# Patient Record
Sex: MALE | Race: WHITE | NOT HISPANIC OR LATINO | Employment: OTHER | URBAN - METROPOLITAN AREA
[De-identification: names, ages, dates, MRNs, and addresses within clinical notes are randomized per-mention and may not be internally consistent; named-entity substitution may affect disease eponyms.]

---

## 2018-03-26 ENCOUNTER — OFFICE VISIT CONVERTED (OUTPATIENT)
Dept: SURGERY | Facility: CLINIC | Age: 63
End: 2018-03-26
Attending: UROLOGY

## 2018-07-02 ENCOUNTER — CONVERSION ENCOUNTER (OUTPATIENT)
Dept: SURGERY | Facility: CLINIC | Age: 63
End: 2018-07-02

## 2018-07-02 ENCOUNTER — OFFICE VISIT CONVERTED (OUTPATIENT)
Dept: SURGERY | Facility: CLINIC | Age: 63
End: 2018-07-02
Attending: UROLOGY

## 2019-01-07 ENCOUNTER — HOSPITAL ENCOUNTER (OUTPATIENT)
Dept: LAB | Facility: HOSPITAL | Age: 64
Discharge: HOME | End: 2019-01-07

## 2019-01-07 ENCOUNTER — OFFICE VISIT CONVERTED (OUTPATIENT)
Dept: SURGERY | Facility: CLINIC | Age: 64
End: 2019-01-07
Attending: UROLOGY

## 2019-01-07 ENCOUNTER — CONVERSION ENCOUNTER (OUTPATIENT)
Dept: SURGERY | Facility: CLINIC | Age: 64
End: 2019-01-07

## 2019-01-07 LAB — PSA SERPL-MCNC: 0.04 NG/ML (ref 0–4)

## 2019-01-08 ENCOUNTER — HOSPITAL ENCOUNTER (OUTPATIENT)
Dept: MRI IMAGING | Facility: HOSPITAL | Age: 64
Discharge: HOME | End: 2019-01-08
Attending: NURSE PRACTITIONER

## 2019-01-14 ENCOUNTER — OFFICE VISIT CONVERTED (OUTPATIENT)
Dept: ORTHOPEDIC SURGERY | Facility: CLINIC | Age: 64
End: 2019-01-14
Attending: PHYSICIAN ASSISTANT

## 2019-02-07 ENCOUNTER — HOSPITAL ENCOUNTER (OUTPATIENT)
Dept: PERIOP | Facility: HOSPITAL | Age: 64
Setting detail: HOSPITAL OUTPATIENT SURGERY
Discharge: HOME | End: 2019-02-07
Attending: ORTHOPAEDIC SURGERY

## 2019-02-07 LAB
ANION GAP SERPL CALC-SCNC: 16 MMOL/L (ref 8–19)
BUN SERPL-MCNC: 24 MG/DL (ref 5–25)
BUN/CREAT SERPL: 20 {RATIO} (ref 6–20)
CALCIUM SERPL-MCNC: 8.8 MG/DL (ref 8.7–10.4)
CHLORIDE SERPL-SCNC: 104 MMOL/L (ref 99–111)
CONV CO2: 24 MMOL/L (ref 22–32)
CREAT UR-MCNC: 1.22 MG/DL (ref 0.7–1.2)
GFR SERPLBLD BASED ON 1.73 SQ M-ARVRAT: >60 ML/MIN/{1.73_M2}
GLUCOSE SERPL-MCNC: 93 MG/DL (ref 70–99)
OSMOLALITY SERPL CALC.SUM OF ELEC: 294 MOSM/KG (ref 273–304)
POTASSIUM SERPL-SCNC: 4.3 MMOL/L (ref 3.5–5.3)
SODIUM SERPL-SCNC: 140 MMOL/L (ref 135–147)

## 2019-02-20 ENCOUNTER — OFFICE VISIT CONVERTED (OUTPATIENT)
Dept: ORTHOPEDIC SURGERY | Facility: CLINIC | Age: 64
End: 2019-02-20
Attending: PHYSICIAN ASSISTANT

## 2019-07-30 ENCOUNTER — HOSPITAL ENCOUNTER (OUTPATIENT)
Dept: LAB | Facility: HOSPITAL | Age: 64
Discharge: HOME | End: 2019-07-30

## 2019-07-30 ENCOUNTER — OFFICE VISIT CONVERTED (OUTPATIENT)
Dept: SURGERY | Facility: CLINIC | Age: 64
End: 2019-07-30
Attending: UROLOGY

## 2019-07-30 LAB — PSA SERPL-MCNC: 0.06 NG/ML (ref 0–4)

## 2019-12-30 ENCOUNTER — HOSPITAL ENCOUNTER (OUTPATIENT)
Dept: GENERAL RADIOLOGY | Facility: HOSPITAL | Age: 64
Discharge: HOME | End: 2019-12-30
Attending: FAMILY MEDICINE

## 2020-01-24 ENCOUNTER — HOSPITAL ENCOUNTER (OUTPATIENT)
Dept: GENERAL RADIOLOGY | Facility: HOSPITAL | Age: 65
Discharge: HOME | End: 2020-01-24
Attending: NURSE PRACTITIONER

## 2020-01-27 ENCOUNTER — OFFICE VISIT CONVERTED (OUTPATIENT)
Dept: UROLOGY | Facility: CLINIC | Age: 65
End: 2020-01-27
Attending: UROLOGY

## 2020-02-06 ENCOUNTER — OFFICE VISIT CONVERTED (OUTPATIENT)
Dept: NEUROSURGERY | Facility: CLINIC | Age: 65
End: 2020-02-06
Attending: PHYSICIAN ASSISTANT

## 2020-02-20 ENCOUNTER — HOSPITAL ENCOUNTER (OUTPATIENT)
Dept: NUCLEAR MEDICINE | Facility: HOSPITAL | Age: 65
Discharge: HOME | End: 2020-02-20
Attending: FAMILY MEDICINE

## 2020-02-25 ENCOUNTER — HOSPITAL ENCOUNTER (OUTPATIENT)
Dept: GENERAL RADIOLOGY | Facility: HOSPITAL | Age: 65
Discharge: HOME | End: 2020-02-25
Attending: FAMILY MEDICINE

## 2020-04-30 ENCOUNTER — TELEPHONE CONVERTED (OUTPATIENT)
Dept: NEUROSURGERY | Facility: CLINIC | Age: 65
End: 2020-04-30
Attending: PHYSICIAN ASSISTANT

## 2020-05-21 ENCOUNTER — OFFICE VISIT CONVERTED (OUTPATIENT)
Dept: NEUROSURGERY | Facility: CLINIC | Age: 65
End: 2020-05-21
Attending: NEUROLOGICAL SURGERY

## 2020-06-30 LAB — SARS-COV-2 RNA SPEC QL NAA+PROBE: NOT DETECTED

## 2020-07-01 ENCOUNTER — HOSPITAL ENCOUNTER (OUTPATIENT)
Dept: PERIOP | Facility: HOSPITAL | Age: 65
Setting detail: HOSPITAL OUTPATIENT SURGERY
Discharge: HOME | End: 2020-07-01
Attending: NEUROLOGICAL SURGERY

## 2020-07-23 ENCOUNTER — OFFICE VISIT CONVERTED (OUTPATIENT)
Dept: NEUROSURGERY | Facility: CLINIC | Age: 65
End: 2020-07-23
Attending: PHYSICIAN ASSISTANT

## 2020-07-27 ENCOUNTER — HOSPITAL ENCOUNTER (OUTPATIENT)
Dept: LAB | Facility: HOSPITAL | Age: 65
Discharge: HOME | End: 2020-07-27
Attending: UROLOGY

## 2020-07-27 ENCOUNTER — OFFICE VISIT CONVERTED (OUTPATIENT)
Dept: UROLOGY | Facility: CLINIC | Age: 65
End: 2020-07-27
Attending: UROLOGY

## 2020-07-27 ENCOUNTER — CONVERSION ENCOUNTER (OUTPATIENT)
Dept: SURGERY | Facility: CLINIC | Age: 65
End: 2020-07-27

## 2020-07-27 LAB — PSA SERPL-MCNC: 0.1 NG/ML (ref 0–4)

## 2020-08-12 ENCOUNTER — HOSPITAL ENCOUNTER (OUTPATIENT)
Dept: LAB | Facility: HOSPITAL | Age: 65
Discharge: HOME | End: 2020-08-12
Attending: FAMILY MEDICINE

## 2020-08-12 LAB
ALBUMIN SERPL-MCNC: 4 G/DL (ref 3.5–5)
ALBUMIN/GLOB SERPL: 1.3 {RATIO} (ref 1.4–2.6)
ALP SERPL-CCNC: 85 U/L (ref 56–155)
ALT SERPL-CCNC: 22 U/L (ref 10–40)
ANION GAP SERPL CALC-SCNC: 20 MMOL/L (ref 8–19)
AST SERPL-CCNC: 19 U/L (ref 15–50)
BASOPHILS # BLD AUTO: 0.07 10*3/UL (ref 0–0.2)
BASOPHILS NFR BLD AUTO: 1.1 % (ref 0–3)
BILIRUB SERPL-MCNC: 0.79 MG/DL (ref 0.2–1.3)
BUN SERPL-MCNC: 23 MG/DL (ref 5–25)
BUN/CREAT SERPL: 14 {RATIO} (ref 6–20)
CALCIUM SERPL-MCNC: 10 MG/DL (ref 8.7–10.4)
CHLORIDE SERPL-SCNC: 101 MMOL/L (ref 99–111)
CHOLEST SERPL-MCNC: 129 MG/DL (ref 107–200)
CHOLEST/HDLC SERPL: 4.6 {RATIO} (ref 3–6)
CONV ABS IMM GRAN: 0.07 10*3/UL (ref 0–0.2)
CONV CO2: 25 MMOL/L (ref 22–32)
CONV IMMATURE GRAN: 1.1 % (ref 0–1.8)
CONV TOTAL PROTEIN: 7.1 G/DL (ref 6.3–8.2)
CREAT UR-MCNC: 1.69 MG/DL (ref 0.7–1.2)
DEPRECATED RDW RBC AUTO: 46.4 FL (ref 35.1–43.9)
EOSINOPHIL # BLD AUTO: 0.2 10*3/UL (ref 0–0.7)
EOSINOPHIL # BLD AUTO: 3.2 % (ref 0–7)
ERYTHROCYTE [DISTWIDTH] IN BLOOD BY AUTOMATED COUNT: 14.5 % (ref 11.6–14.4)
GFR SERPLBLD BASED ON 1.73 SQ M-ARVRAT: 42 ML/MIN/{1.73_M2}
GLOBULIN UR ELPH-MCNC: 3.1 G/DL (ref 2–3.5)
GLUCOSE SERPL-MCNC: 96 MG/DL (ref 70–99)
HCT VFR BLD AUTO: 36.5 % (ref 42–52)
HDLC SERPL-MCNC: 28 MG/DL (ref 40–60)
HGB BLD-MCNC: 11.8 G/DL (ref 14–18)
LDLC SERPL CALC-MCNC: 42 MG/DL (ref 70–100)
LYMPHOCYTES # BLD AUTO: 1.48 10*3/UL (ref 1–5)
LYMPHOCYTES NFR BLD AUTO: 23.4 % (ref 20–45)
MCH RBC QN AUTO: 29.1 PG (ref 27–31)
MCHC RBC AUTO-ENTMCNC: 32.3 G/DL (ref 33–37)
MCV RBC AUTO: 89.9 FL (ref 80–96)
MONOCYTES # BLD AUTO: 0.83 10*3/UL (ref 0.2–1.2)
MONOCYTES NFR BLD AUTO: 13.1 % (ref 3–10)
NEUTROPHILS # BLD AUTO: 3.67 10*3/UL (ref 2–8)
NEUTROPHILS NFR BLD AUTO: 58.1 % (ref 30–85)
NRBC CBCN: 0 % (ref 0–0.7)
OSMOLALITY SERPL CALC.SUM OF ELEC: 298 MOSM/KG (ref 273–304)
PLATELET # BLD AUTO: 223 10*3/UL (ref 130–400)
PMV BLD AUTO: 9.8 FL (ref 9.4–12.4)
POTASSIUM SERPL-SCNC: 3.6 MMOL/L (ref 3.5–5.3)
RBC # BLD AUTO: 4.06 10*6/UL (ref 4.7–6.1)
SODIUM SERPL-SCNC: 142 MMOL/L (ref 135–147)
TESTOST SERPL-MCNC: 458 NG/DL (ref 193–740)
TRIGL SERPL-MCNC: 295 MG/DL (ref 40–150)
TSH SERPL-ACNC: 2.76 M[IU]/L (ref 0.27–4.2)
VLDLC SERPL-MCNC: 59 MG/DL (ref 5–37)
WBC # BLD AUTO: 6.32 10*3/UL (ref 4.8–10.8)

## 2020-09-17 ENCOUNTER — HOSPITAL ENCOUNTER (OUTPATIENT)
Dept: ULTRASOUND IMAGING | Facility: HOSPITAL | Age: 65
Discharge: HOME | End: 2020-09-17
Attending: NURSE PRACTITIONER

## 2020-10-15 ENCOUNTER — OFFICE VISIT CONVERTED (OUTPATIENT)
Dept: FAMILY MEDICINE CLINIC | Facility: CLINIC | Age: 65
End: 2020-10-15
Attending: PHYSICIAN ASSISTANT

## 2020-10-26 ENCOUNTER — HOSPITAL ENCOUNTER (OUTPATIENT)
Dept: LAB | Facility: HOSPITAL | Age: 65
Discharge: HOME | End: 2020-10-26
Attending: NURSE PRACTITIONER

## 2020-10-26 LAB
ALBUMIN SERPL-MCNC: 4 G/DL (ref 3.5–5)
ALBUMIN/GLOB SERPL: 1.3 {RATIO} (ref 1.4–2.6)
ALP SERPL-CCNC: 100 U/L (ref 56–155)
ALT SERPL-CCNC: 16 U/L (ref 10–40)
ANION GAP SERPL CALC-SCNC: 18 MMOL/L (ref 8–19)
APPEARANCE UR: ABNORMAL
AST SERPL-CCNC: 16 U/L (ref 15–50)
BASOPHILS # BLD AUTO: 0.05 10*3/UL (ref 0–0.2)
BASOPHILS NFR BLD AUTO: 0.9 % (ref 0–3)
BILIRUB SERPL-MCNC: 0.36 MG/DL (ref 0.2–1.3)
BILIRUB UR QL: NEGATIVE
BUN SERPL-MCNC: 15 MG/DL (ref 5–25)
BUN/CREAT SERPL: 15 {RATIO} (ref 6–20)
CALCIUM SERPL-MCNC: 9.2 MG/DL (ref 8.7–10.4)
CHLORIDE SERPL-SCNC: 105 MMOL/L (ref 99–111)
COLOR UR: YELLOW
CONV ABS IMM GRAN: 0.03 10*3/UL (ref 0–0.2)
CONV BACTERIA: NEGATIVE
CONV CO2: 23 MMOL/L (ref 22–32)
CONV COLLECTION SOURCE (UA): ABNORMAL
CONV IMMATURE GRAN: 0.6 % (ref 0–1.8)
CONV TOTAL PROTEIN: 7.2 G/DL (ref 6.3–8.2)
CONV UROBILINOGEN IN URINE BY AUTOMATED TEST STRIP: 0.2 {EHRLICHU}/DL (ref 0.1–1)
CREAT UR-MCNC: 0.98 MG/DL (ref 0.7–1.2)
DEPRECATED RDW RBC AUTO: 42.8 FL (ref 35.1–43.9)
EOSINOPHIL # BLD AUTO: 0.16 10*3/UL (ref 0–0.7)
EOSINOPHIL # BLD AUTO: 3 % (ref 0–7)
ERYTHROCYTE [DISTWIDTH] IN BLOOD BY AUTOMATED COUNT: 13.2 % (ref 11.6–14.4)
FERRITIN SERPL-MCNC: 60 NG/ML (ref 30–300)
GFR SERPLBLD BASED ON 1.73 SQ M-ARVRAT: >60 ML/MIN/{1.73_M2}
GLOBULIN UR ELPH-MCNC: 3.2 G/DL (ref 2–3.5)
GLUCOSE SERPL-MCNC: 96 MG/DL (ref 70–99)
GLUCOSE UR QL: NEGATIVE MG/DL
HCT VFR BLD AUTO: 39.9 % (ref 42–52)
HGB BLD-MCNC: 12.8 G/DL (ref 14–18)
HGB UR QL STRIP: NEGATIVE
IRON SATN MFR SERPL: 14 % (ref 20–55)
IRON SERPL-MCNC: 50 UG/DL (ref 70–180)
KETONES UR QL STRIP: NEGATIVE MG/DL
LEUKOCYTE ESTERASE UR QL STRIP: NEGATIVE
LYMPHOCYTES # BLD AUTO: 1.3 10*3/UL (ref 1–5)
LYMPHOCYTES NFR BLD AUTO: 24.6 % (ref 20–45)
MCH RBC QN AUTO: 28.5 PG (ref 27–31)
MCHC RBC AUTO-ENTMCNC: 32.1 G/DL (ref 33–37)
MCV RBC AUTO: 88.9 FL (ref 80–96)
MONOCYTES # BLD AUTO: 0.69 10*3/UL (ref 0.2–1.2)
MONOCYTES NFR BLD AUTO: 13 % (ref 3–10)
NEUTROPHILS # BLD AUTO: 3.06 10*3/UL (ref 2–8)
NEUTROPHILS NFR BLD AUTO: 57.9 % (ref 30–85)
NITRITE UR QL STRIP: NEGATIVE
NRBC CBCN: 0 % (ref 0–0.7)
OSMOLALITY SERPL CALC.SUM OF ELEC: 295 MOSM/KG (ref 273–304)
PH UR STRIP.AUTO: 5 [PH] (ref 5–8)
PLATELET # BLD AUTO: 265 10*3/UL (ref 130–400)
PMV BLD AUTO: 9.1 FL (ref 9.4–12.4)
POTASSIUM SERPL-SCNC: 3.7 MMOL/L (ref 3.5–5.3)
PROT UR QL: NEGATIVE MG/DL
RBC # BLD AUTO: 4.49 10*6/UL (ref 4.7–6.1)
RBC #/AREA URNS HPF: ABNORMAL /[HPF]
SODIUM SERPL-SCNC: 142 MMOL/L (ref 135–147)
SP GR UR: 1.02 (ref 1–1.03)
TIBC SERPL-MCNC: 347 UG/DL (ref 245–450)
TRANSFERRIN SERPL-MCNC: 243 MG/DL (ref 215–365)
WBC # BLD AUTO: 5.29 10*3/UL (ref 4.8–10.8)
WBC #/AREA URNS HPF: ABNORMAL /[HPF]

## 2020-11-16 ENCOUNTER — OFFICE VISIT CONVERTED (OUTPATIENT)
Dept: OTOLARYNGOLOGY | Facility: CLINIC | Age: 65
End: 2020-11-16
Attending: OTOLARYNGOLOGY

## 2020-12-10 ENCOUNTER — HOSPITAL ENCOUNTER (OUTPATIENT)
Dept: GENERAL RADIOLOGY | Facility: HOSPITAL | Age: 65
Discharge: HOME | End: 2020-12-10
Attending: OTOLARYNGOLOGY

## 2020-12-10 LAB
CREAT BLD-MCNC: 1 MG/DL (ref 0.6–1.4)
GFR SERPLBLD BASED ON 1.73 SQ M-ARVRAT: >60 ML/MIN/{1.73_M2}

## 2020-12-14 ENCOUNTER — TELEPHONE CONVERTED (OUTPATIENT)
Dept: OTOLARYNGOLOGY | Facility: CLINIC | Age: 65
End: 2020-12-14
Attending: OTOLARYNGOLOGY

## 2021-01-11 ENCOUNTER — HOSPITAL ENCOUNTER (OUTPATIENT)
Dept: LAB | Facility: HOSPITAL | Age: 66
Discharge: HOME | End: 2021-01-11
Attending: UROLOGY

## 2021-01-11 LAB — PSA SERPL-MCNC: 0.07 NG/ML (ref 0–4)

## 2021-01-15 ENCOUNTER — OFFICE VISIT CONVERTED (OUTPATIENT)
Dept: FAMILY MEDICINE CLINIC | Facility: CLINIC | Age: 66
End: 2021-01-15
Attending: PHYSICIAN ASSISTANT

## 2021-01-21 ENCOUNTER — OFFICE VISIT CONVERTED (OUTPATIENT)
Dept: SURGERY | Facility: CLINIC | Age: 66
End: 2021-01-21
Attending: NURSE PRACTITIONER

## 2021-01-21 ENCOUNTER — CONVERSION ENCOUNTER (OUTPATIENT)
Dept: SURGERY | Facility: CLINIC | Age: 66
End: 2021-01-21

## 2021-01-28 ENCOUNTER — HOSPITAL ENCOUNTER (OUTPATIENT)
Dept: LAB | Facility: HOSPITAL | Age: 66
Discharge: HOME | End: 2021-01-28
Attending: INTERNAL MEDICINE

## 2021-01-28 LAB
ALBUMIN SERPL-MCNC: 4.1 G/DL (ref 3.5–5)
ALBUMIN/GLOB SERPL: 1.3 {RATIO} (ref 1.4–2.6)
ALP SERPL-CCNC: 127 U/L (ref 56–155)
ALT SERPL-CCNC: 14 U/L (ref 10–40)
ANION GAP SERPL CALC-SCNC: 17 MMOL/L (ref 8–19)
AST SERPL-CCNC: 15 U/L (ref 15–50)
BASOPHILS # BLD AUTO: 0.05 10*3/UL (ref 0–0.2)
BASOPHILS NFR BLD AUTO: 0.7 % (ref 0–3)
BILIRUB SERPL-MCNC: 0.61 MG/DL (ref 0.2–1.3)
BUN SERPL-MCNC: 22 MG/DL (ref 5–25)
BUN/CREAT SERPL: 20 {RATIO} (ref 6–20)
CALCIUM SERPL-MCNC: 9.1 MG/DL (ref 8.7–10.4)
CHLORIDE SERPL-SCNC: 104 MMOL/L (ref 99–111)
CONV ABS IMM GRAN: 0.04 10*3/UL (ref 0–0.2)
CONV CO2: 23 MMOL/L (ref 22–32)
CONV CREATININE URINE, RANDOM: 52.7 MG/DL (ref 10–300)
CONV IMMATURE GRAN: 0.6 % (ref 0–1.8)
CONV TOTAL PROTEIN: 7.2 G/DL (ref 6.3–8.2)
CREAT UR-MCNC: 1.09 MG/DL (ref 0.7–1.2)
DEPRECATED RDW RBC AUTO: 45.7 FL (ref 35.1–43.9)
EOSINOPHIL # BLD AUTO: 0.14 10*3/UL (ref 0–0.7)
EOSINOPHIL # BLD AUTO: 2 % (ref 0–7)
ERYTHROCYTE [DISTWIDTH] IN BLOOD BY AUTOMATED COUNT: 14.2 % (ref 11.6–14.4)
GFR SERPLBLD BASED ON 1.73 SQ M-ARVRAT: >60 ML/MIN/{1.73_M2}
GLOBULIN UR ELPH-MCNC: 3.1 G/DL (ref 2–3.5)
GLUCOSE SERPL-MCNC: 121 MG/DL (ref 70–99)
HCT VFR BLD AUTO: 41.7 % (ref 42–52)
HGB BLD-MCNC: 13.4 G/DL (ref 14–18)
LYMPHOCYTES # BLD AUTO: 1.54 10*3/UL (ref 1–5)
LYMPHOCYTES NFR BLD AUTO: 21.7 % (ref 20–45)
MCH RBC QN AUTO: 28.5 PG (ref 27–31)
MCHC RBC AUTO-ENTMCNC: 32.1 G/DL (ref 33–37)
MCV RBC AUTO: 88.5 FL (ref 80–96)
MONOCYTES # BLD AUTO: 0.78 10*3/UL (ref 0.2–1.2)
MONOCYTES NFR BLD AUTO: 11 % (ref 3–10)
NEUTROPHILS # BLD AUTO: 4.56 10*3/UL (ref 2–8)
NEUTROPHILS NFR BLD AUTO: 64 % (ref 30–85)
NRBC CBCN: 0 % (ref 0–0.7)
OSMOLALITY SERPL CALC.SUM OF ELEC: 295 MOSM/KG (ref 273–304)
PLATELET # BLD AUTO: 309 10*3/UL (ref 130–400)
PMV BLD AUTO: 9.7 FL (ref 9.4–12.4)
POTASSIUM SERPL-SCNC: 3.7 MMOL/L (ref 3.5–5.3)
PROT UR-MCNC: <4 MG/DL
RBC # BLD AUTO: 4.71 10*6/UL (ref 4.7–6.1)
SODIUM SERPL-SCNC: 140 MMOL/L (ref 135–147)
URATE SERPL-MCNC: 4.4 MG/DL (ref 3.5–8.5)
WBC # BLD AUTO: 7.11 10*3/UL (ref 4.8–10.8)

## 2021-02-04 ENCOUNTER — HOSPITAL ENCOUNTER (OUTPATIENT)
Dept: GASTROENTEROLOGY | Facility: HOSPITAL | Age: 66
Setting detail: HOSPITAL OUTPATIENT SURGERY
Discharge: HOME | End: 2021-02-04
Attending: INTERNAL MEDICINE

## 2021-03-15 ENCOUNTER — HOSPITAL ENCOUNTER (OUTPATIENT)
Dept: VACCINE CLINIC | Facility: HOSPITAL | Age: 66
Discharge: HOME | End: 2021-03-15
Attending: INTERNAL MEDICINE

## 2021-04-05 ENCOUNTER — HOSPITAL ENCOUNTER (OUTPATIENT)
Dept: VACCINE CLINIC | Facility: HOSPITAL | Age: 66
Discharge: HOME | End: 2021-04-05
Attending: INTERNAL MEDICINE

## 2021-05-10 NOTE — H&P
"   History and Physical      Patient Name: Dion Espana   Patient ID: [id number]   Sex: Male   YOB: 1955    Primary Care Provider: Dion Self PA-C   Referring Provider: Dion Self PA-C    Visit Date: 2020    Provider: Chau Rosas MD   Location: [facility]   Location Address: [address] [place] [address], [address] [place] [address]  [id number]   Location Phone: [phone]          Chief Complaint     \"My hearing has been getting worse and my ears are hurting.\"       History Of Present Illness  Dion Espana is a 65 year old /White male with past medical history significant for hypertension and hyperlipidemia who presents to the office today as a consult from Dion Self PA-C for evaluation of his ears. He tells me that he has noticed right greater than left hearing loss for a number of years but it seems to be worse over the past 3 months. This has been associated with right greater than left high-pitched tinnitus which will occasionally keep him up at night. He has also been experiencing intermittent achy otalgia which radiates to the mandible bilaterally. He has not had any issues with otorrhea or vertigo. He denies any prior otologic trauma, otologic surgery, or history of otitis media. He does report that his ears will often pop when he is chewing. He denies any history of ototoxic medication exposure or family history of hearing loss. However, he does report that his father  in his mid 40s. 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 He also frequently rides on tractors and will shoot firearms. He has not undergone an audiogram.       Past Medical History  Arthritis; Bone lesion; Diverticulitis; Essential hypertension; Gout; Hearing loss; Hemorrhoids; History of prostate cancer; Hyperlipidemia; Hypertension, Benign Essential; Lumbago/low back pain; Lumbar disc herniation, L3-4; Lumbar Spinal " "Stenosis; Paresthesia; Radiculopathy, lumbosacral; Renal insufficiency; Sciatica; Tinnitus         Past Surgical History  Back surgery; Carpal Tunnel Release; Cholecystecomy; Knee surgery; Minimally invasive Discectomy; Prostate Surgery; Thumb surgery         Medication List  allopurinol 300 mg oral tablet; atorvastatin 20 mg oral tablet; krill oil 500 mg oral capsule; Lotrel 5-20 mg oral capsule; metoprolol tartrate 100 mg oral tablet; sildenafil 100 mg oral tablet         Allergy List  NO KNOWN DRUG ALLERGIES         Family Medical History  Family history of Arthritis; Family history of stroke; Family history of heart disease; Family history of diabetes mellitus         Social History  Alcohol (Light); lives with children; lives with spouse; ; Recreational Drug Use (Never); Retired; Tobacco (Never)         Immunizations  Name Date Admin   Influenza 10/15/2020         Review of Systems  · Constitutional  o Denies  o : fever, night sweats, weight loss  · Eyes  o Denies  o : discharge from eye, impaired vision  · HENT  o Admits  o : *See HPI  · Cardiovascular  o Denies  o : chest pain, irregular heart beats  · Respiratory  o Denies  o : shortness of breath, wheezing, coughing up blood  · Gastrointestinal  o Denies  o : heartburn, reflux, vomiting blood  · Genitourinary  o Denies  o : frequency  · Integument  o Denies  o : rash, skin dryness  · Neurologic  o Denies  o : seizures, loss of balance, loss of consciousness, dizziness  · Endocrine  o Denies  o : cold intolerance, heat intolerance  · Heme-Lymph  o Denies  o : easy bleeding, anemia      Vitals  Date Time BP Position Site L\R Cuff Size HR RR TEMP (F) WT  HT  BMI kg/m2 BSA m2 O2 Sat FR L/min FiO2        11/16/2020 09:57 AM        98.1 216lbs 0oz 5'  11\" 30.13 2.22             Physical Examination  · Constitutional  o Appearance  o : well developed, well-nourished, alert and in no acute distress, voice clear and strong  · Head and Face  o Head  o : "   § Inspection  § : no deformities or lesions  o Face  o :   § Inspection  § : No facial lesions; House-Brackmann I/VI bilaterally  § Palpation  § : TMJ crepitus with right greater than left  muscle tenderness to palpation bilaterally  · Eyes  o Vision  o :   § Visual Fields  § : Extraocular movements are intact. No spontaneous or gaze-induced nystagmus.  o Conjunctivae  o : clear  o Sclerae  o : clear  o Pupils and Irises  o : pupils equal, round, and reactive to light.   · Ears, Nose, Mouth and Throat  o Ears  o :   § External Ears  § : appearance within normal limits, no lesions present  § Otoscopic Examination  § : Bilateral external auditory canals are normal in appearance. Bilateral tympanic membranes with small scattered patches of myringosclerosis but are otherwise unremarkable  § Hearing  § : intact to conversational voice both ears  o Nose  o :   § External Nose  § : appearance normal  § Intranasal Exam  § : mucosa within normal limits, vestibules normal, no intranasal lesions present, septum midline, sinuses non tender to percussion  o Oral Cavity  o :   § Oral Mucosa  § : oral mucosa normal without pallor or cyanosis  § Lips  § : lip appearance normal  § Teeth  § : normal dentition for age  § Gums  § : gums pink, non-swollen, no bleeding present  § Tongue  § : tongue appearance normal; normal mobility  § Palate  § : hard palate normal, soft palate appearance normal with symmetric mobility  o Throat  o :   § Oropharynx  § : no inflammation or lesions present, tonsils within normal limits  § Hypopharynx  § : Deferred secondary to gag reflex  § Larynx  § : Deferred secondary to gag reflex  · Neck  o Inspection/Palpation  o : normal appearance, no masses or tenderness, trachea midline; thyroid size normal, nontender, no nodules or masses present on palpation  · Respiratory  o Respiratory Effort  o : breathing unlabored  o Inspection of Chest  o : normal appearance, no  retractions  · Cardiovascular  o Heart  o : regular rate and rhythm  · Lymphatic  o Neck  o : no lymphadenopathy present  o Supraclavicular Nodes  o : no lymphadenopathy present  o Preauricular Nodes  o : no lymphadenopathy present  · Skin and Subcutaneous Tissue  o General Inspection  o : Regarding face and neck - there are no rashes present, no lesions present, and no areas of discoloration  · Neurologic  o Cranial Nerves  o : cranial nerves II-XII are grossly intact bilaterally  o Gait and Station  o : normal gait, able to stand without diffculty  · Psychiatric  o Judgement and Insight  o : judgment and insight intact  o Mood and Affect  o : mood normal, affect appropriate          Assessment  · Otalgia, bilateral     388.70/H92.03  · Tinnitus, bilateral     388.30/H93.13  · Asymmetrical sensorineural hearing loss     389.16/H90.5  · Myofascial muscle pain     729.1/M79.18      Plan  · Orders  o Audiometry, comprehensive, threshold evaluation and speech recognition [facility] ([id number]) - 389.16/H90.5, 388.30/H93.13 - 11/16/2020  o Tympanogram (Impedance Testing) [facility] ([id number]) - 389.16/H90.5, 388.30/H93.13 - 11/16/2020  o MRI Brain with IACs without and with Contrast [facility] ([id number]) - 389.16/H90.5 - 11/16/2020  o Audiometry, comprehensive, threshold evaluation and speech recognition [facility] ([id number]) - 389.16/H90.5, 388.30/H93.13 - 05/16/2021  · Medications  o diclofenac sodium 50 mg oral tablet,delayed release (DR/EC)   SIG: take 1 tablet (50 mg) by oral route 2 times per day for 14 days   DISP: (28) Tablet with 1 refills  Prescribed on 11/16/2020     o Medications have been Reconciled  o Transition of Care or Provider Policy  · Instructions  o Impressions and findings were discussed with Mr. Espana at great length. Currently, he is seen for evaluation worsening longstanding right greater than left hearing loss and tinnitus as well as intermittent right greater than left achy otalgia which radiates to the mandible. Examination  today reveals right greater than left  muscle tenderness to palpation and small patches of myringosclerosis but is otherwise unremarkable. Same-day audiogram revealed left normal downsloping to moderately severe sensorineural hearing loss rising back to normal and right normal downsloping to severe sensorineural hearing loss rising. Both of these were in a cookie bite pattern. SRT is 45 on the right and 20 on the left. Speech discrimination is 64% on the right at 75 dB and 96% on the left at 60 dB. Tympanograms are type A. We discussed that this is consistent with asymmetric sensorineural hearing loss which is likely congenital given its pattern. Options for further evaluation were discussed and I recommended MRI of his internal auditory canals with and without contrast to rule out any retrocochlear pathology. In regards to his ear pain I will send in a course of diclofenac and he may take some cyclobenzaprine he was prescribed for another indication. We discussed conservative measures. I will call him with the results of his MRI he will follow-up in 6 months with a repeat audiogram or sooner if he feels like there is any changes.  · Correspondence  o ENT Letter to Referring MD (Dion Self PA-C) - 11/16/2020            Electronically Signed by: Chau Rosas MD -Author on November 16, 2020 11:32:38 AM

## 2021-05-10 NOTE — H&P
"   History and Physical      Patient Name: Dion Espana   Patient ID: [id number]   Sex: Male   YOB: 1955    Primary Care Provider: Dion Self PA-C   Referring Provider: Dion Self PA-C    Visit Date: January 21, 2021    Provider: NICOLAS Kapadia   Location: [facility]   Location Address: [address] [place] [address]  [id number]   Location Phone: [phone]          Chief Complaint  · urologic issues      History Of Present Illness     65yo male s/p RALP with bilateral PLND for intermediate risk prostate cancer.   Surgery in Sept 2016  PSA 4.7  Biopsy - 3+4 L mid and 3+3 R base  cT1c    Final Path - Surgery in Sept.   Marely 3+4 (Marely Group 2)  [id number]  margins were negative    Patient is pad free. He has tried Viagra for erections with about 50% erection with this. We discussed ICI today and he is interested in this. I will provide him with hand out for that and he will contact me should he want to have that.     3/26/18 - Patient is doing well. He is pad free. He denies urinary issues. The patient had PSA in March 2017 that was 0.02. He had this repeated 6 months ago and was 0.02. Prior to this in Dec 2016, PSA was undetectable. I have recommended checking the PSA today. The patient does not want to do ICI. He says his erections \"come and go\".    7/2/18 - Patient is doing well. He presents in follow up. He is doing well. He is pad free. His most recent PSA in March was <0.01. We reviewed this. I have recommended continuing to follow the PSA and checking that today. For erections, we discussed ICI. He does not want to do that at this point, but will consider that going forward.    1/7/19 - Patient presents in follow up. He is doing well. His last PSA in July was 0.04. His doubling time was 10 months. He has not had a PSA since. He states that he is doing well. The patient is pad free. He would like to try Sildenafil 100mg for ED. We discussed side effects " "including HA, facial flushing, slight drop in BP and erection that lasts over 4 hours.    7/30/19 - Patient presents in follow up. He is doing well. He has not had a PSA since I last saw him in Jan 2019. His PSA at that time was 0.04. His PSA 1 year ago was 0.04. I would like to have that drawn. He is pad free. He is using Sildenafil and has HAs and some vision changes with this. He describes his erections as \"coming and going\".    1/27/20:  Patient is here for routine follow up.  He is doing well.  His PSA is less than 0.01 recently.  He is having no issues with incontinence and is happy today with no new issues.    7/27/20:  Patient is here for routine follow up.  He is doing well with no new issues.  His most recent PSA has not been done.    1/21/21: Patient presents today for routine follow-up. He reports that he is doing well without complaints. Voiding well with a great urinary stream. PSA today is 0.07. Denies any urinary hesitancy or retention. He is happy with no new issues.       Past Medical History  Disease Name Date Onset Notes   Arthritis --  --    Bone lesion 05/21/2020 --    Diverticulitis --  --    Essential hypertension 10/15/2020 --    Gout 10/15/2020 --    Hearing loss --  --    Hemorrhoids --  --    History of prostate cancer 05/21/2020 --    Hyperlipidemia 10/15/2020 --    Hypertension, Benign Essential --  --    Lumbago/low back pain 07/13/2017 --    Lumbar disc herniation, L3-4 07/13/2017 left   Lumbar Spinal Stenosis 05/21/2020 --    Paresthesia 05/21/2020 --    Radiculopathy, lumbosacral 05/21/2020 --    Renal insufficiency 10/15/2020 --    Sciatica 07/13/2017 --    Tinnitus --  --          Past Surgical History  Procedure Name Date Notes   Back surgery 2017 left L3/4 MID by Dr. Ferrari   Carpal Tunnel Release --  --    Cholecystecomy --  --    Colonoscopy 2006 Dr. Sebastian   Knee surgery --  --    Minimally invasive Discectomy 7- L3-4 Dr. Ferrari   Prostate Biopsy --  --    Prostate " Surgery --  --    Thumb surgery --  --          Medication List  Name Date Started Instructions   allopurinol 300 mg oral tablet 01/15/2021 take 1 tablet (300 mg) by oral route once daily   atorvastatin 20 mg oral tablet 01/15/2021 take 1 tablet (20 mg) by oral route once daily at bedtime for 90 days   krill oil 500 mg oral capsule  take 1 capsule by oral route daily   Lidoderm 5 % topical adhesive patch,medicated 01/15/2021 apply 1 patch by transdermal route once daily (May wear up to 12hours.)   Lotrel 5-20 mg oral capsule 01/15/2021 take 1 capsule by oral route once daily for 90 days   metoprolol tartrate 100 mg oral tablet 01/15/2021 take 1 tablet (100 mg) by oral route once daily with a meal for 90 days         Allergy List  Allergen Name Date Reaction Notes   NO KNOWN DRUG ALLERGIES --  --  --          Family Medical History  Disease Name Relative/Age Notes   No family history of colorectal cancer  --    Family history of Arthritis Mother/   Mother   Family history of stroke Brother/  Mother/   --    Family history of heart disease Father/  Mother/   --    Family history of diabetes mellitus Mother/   Mother         Social History  Finding Status Start/Stop Quantity Notes   Alcohol Light --/-- --  --    lives with children --  --/-- --  --    lives with spouse --  --/-- --  --     --  --/-- --  --    Recreational Drug Use Never --/-- --  no   Retired --  --/-- --  --    Tobacco Never --/-- --  --          Review of Systems  · Constitutional  o Denies  o : fatigue, fever  · Cardiovascular  o Denies  o : chest pain, heart disease, heart attack  · Respiratory  o Denies  o : lung disease, shortness of breath, asthma  · Gastrointestinal  o Denies  o : stomach or bowel disease, blood in stools, ulcers  · Genitourinary  o Denies  o : frequent urination , kidney or bladder infections, urgency or urination, difficulty voiding, interrupted stream, urgency incontinence, urinary leakage, voiding at night, painful  "intercourse, painful urination, straining to urinate, sexual dysfunction, blood in urine, slow stream, kidney stone  · Neurologic  o Denies  o : neurologic disease  · Musculoskeletal  o Denies  o : swelling or pain in your lower extremities      Vitals  Date Time BP Position Site L\R Cuff Size HR RR TEMP (F) WT  HT  BMI kg/m2 BSA m2 O2 Sat FR L/min FiO2 HC       01/21/2021 01:24 /58 Sitting       210lbs 0oz 5'  11\" 29.29 2.18             Physical Examination  · Constitutional  o Appearance  o : well-nourished, well developed, alert, in no acute distress  · Head and Face  o Head  o :   § Inspection  § : atraumatic, normocephalic  o Face  o :   § Inspection  § : no facial lesions  · Eyes  o Sclerae  o : sclerae white  · Ears, Nose, Mouth and Throat  o Ears  o :   § External Ears  § : appearance within normal limits, no lesions present  o Nose  o :   § External Nose  § : appearance normal  · Neck  o Inspection/Palpation  o : normal appearance, no masses or tenderness, trachea midline  · Respiratory  o Respiratory Effort  o : breathing unlabored  · Gastrointestinal  o Abdominal Examination  o : abdomen nontender to palpation, tone normal without rigidity or guarding, no masses present, abdominal contour scaphoid  · Skin and Subcutaneous Tissue  o General Inspection  o : no rashes or lesions present, no lesions present, no areas of discoloration  · Neurologic  o Mental Status Examination  o :   § Orientation  § : grossly oriented to person, place and time  § Speech/Language  § : communication ability within normal limits  o Gait and Station  o : normal gait, able to stand without difficulty  · Psychiatric  o Judgement and Insight  o : judgment and insight intact, judgement for everyday activities and social situations within normal limits, insight intact  o Mood and Affect  o : mood normal, affect appropriate          Results  · In-Office Procedures  o Lab procedure  § Automated Dipstick Urinalysis (Surg Spec) " WITHOUT Micro [facility] (66703)   § Color Ur: Yellow   § Clarity Ur: Clear   § Glucose Ur Ql Strip: Negative   § Bilirub Ur Ql Strip: Negative   § Ketones Ur Ql Strip: Negative   § Sp Gr Ur Qn: greater than 1.030   § Hgb Ur Ql Strip: Trace-Intact   § pH Ur-LsCnc: 6.0   § Prot Ur Ql Strip: Negative   § Urobilinogen Ur Strip-mCnc: 0.2 E.U./dL   § Nitrite Ur Ql Strip: Negative   § WBC Est Ur Ql Strip: Negative       Assessment  · Prostate CA     185/C61      Plan  · Medications  o Medications have been Reconciled  o Transition of Care or Provider Policy  · Instructions  o Check PSA and F/U in 6 months.   o Electronically Identified Patient Education Materials Provided Electronically  · Disposition  o Call or Return if symptoms worsen or persist.            Electronically Signed by: NICOLAS Kapadia -Author on January 21, 2021 02:45:33 PM

## 2021-05-10 NOTE — H&P
History and Physical      Patient Name: Dion Espana   Patient ID: [id number]   Sex: Male   YOB: 1955    Primary Care Provider: Dion Self PA-C   Referring Provider: Dion Self PA-C    Visit Date: October 15, 2020    Provider: Dion Self PA-C   Location: [facility]   Location Address: [address] [place] [address], [address]  [place], [place]  [id number]   Location Phone: [phone]          Chief Complaint  · New Patient/Establish Care      History Of Present Illness  Dion Espana is a 65 year old /White male who presents for evaluation and treatment of: new patient/establish care      Pt presents today to establish care.     Pt's previous PCP- Dr. Bui    Pt c/o Tinnitus for months. Pt states it is worse in his (L) ear. for 1 month the ringing has gotten much worse.  He feels like it is muffled.    Pt also c/o arthritis & lower back pain. Pt states the lower back pain radiates to his (R) hip.   Pt takes two tylenol per day he is unable to take NSAIDs    Labs-8/14/20 (Dr. Bui)  Colonoscopy- Years ago    Pt has pmh of prostate cancer - seeing Dr. Alonso  Pt has back pain and hip pain he has had two surgeries with Dr. Ferrari    PSA 6/2020  Nephrologist - Dr. Luz - renal insuff    Meds:  atorvastatin - hyperlipidemia, metoprolol HTN, losartan, allopurinol - gout,   NKDA         Past Medical History  Disease Name Date Onset Notes   Arthritis --  --    Bone lesion 05/21/2020 --    Cancer --  --    Diverticulitis --  --    Gout --  --    Hemorrhoids --  --    High blood pressure --  --    History of prostate cancer 05/21/2020 --    Hyperlipidemia --  --    Hypertension --  --    Hypertension, Benign Essential --  --    Limb Swelling --  --    Lumbago --  --    Lumbago/low back pain 07/13/2017 --    Lumbar disc herniation, L3-4 07/13/2017 left   Lumbar Spinal Stenosis 05/21/2020 --    Paresthesia 05/21/2020 --    Prostate CA --  --    Prostate Disorder --   --    Radiculopathy, lumbosacral 05/21/2020 --    Sciatica 07/13/2017 --          Past Surgical History  Procedure Name Date Notes   Back surgery 2017 left L3/4 MID by Dr. Ferrari   Carpal Tunnel Release --  --    Cholecystecomy --  --    Gallbladder --  --    Knee surgery --  --    Minimally invasive Discectomy 7- L3-4 Dr. Ferrari   Prostate Surgery --  --    Thumb surgery --  --          Medication List  Name Date Started Instructions   allopurinol 300 mg oral tablet  take 1 tablet (300 mg) by oral route once daily   atorvastatin 20 mg oral tablet  take 1 tablet (20 mg) by oral route once daily at bedtime   colchicine 0.6 mg oral tablet  take 1 tablet by oral route As needed   krill oil 500 mg oral capsule  take 1 capsule by oral route daily   losartan-hydrochlorothiazide 100-25 mg oral tablet  take 1 tablet by oral route once daily   metoprolol tartrate 100 mg oral tablet  take 1 tablet (100 mg) by oral route once daily with a meal   Move Free Joint Health 750 mg-100 mg- 1.65 mg-108 mg oral tablet  take 1 tablet by oral route daily   Omega 3-6-9 1,200 mg oral capsule  take 1 capsule by oral route daily         Allergy List  Allergen Name Date Reaction Notes   NO KNOWN DRUG ALLERGIES --  --  --          Family Medical History  Disease Name Relative/Age Notes   Stroke Mother/   Mother   Heart Disease Mother/   Mother  Father; Mother   Hypertension Mother/   --    Diabetes, unspecified type Mother/   Mother   Heart Attack (MI) Father/44   --    Diabetes Mother/   --    Family history of certain chronic disabling diseases; arthritis Mother/   Mother   Family history of Arthritis Mother/   Mother   Family history of stroke Mother/   Mother   Family history of heart disease Father/  Mother/   Mother; Father   Family history of diabetes mellitus Mother/   Mother         Social History  Finding Status Start/Stop Quantity Notes   Alcohol Light --/-- --  --    lives with children --  --/-- --  --    lives with  "spouse --  --/-- --  --     --  --/-- --  --    Recreational Drug Use Never --/-- --  no   Retired --  --/-- --  --    Tobacco Never --/-- --  never smoker         Review of Systems  · Constitutional  o Denies  o : fever, fatigue, weight loss, weight gain  · Cardiovascular  o Denies  o : lower extremity edema, claudication, chest pressure, palpitations  · Respiratory  o Denies  o : shortness of breath, wheezing, cough, hemoptysis, dyspnea on exertion  · Gastrointestinal  o Denies  o : nausea, vomiting, diarrhea, constipation, abdominal pain      Vitals  Date Time BP Position Site L\R Cuff Size HR RR TEMP (F) WT  HT  BMI kg/m2 BSA m2 O2 Sat FR L/min FiO2        10/15/2020 02:57 /71 Sitting    48 - R   215lbs 3.2oz 5'  11\" 30.01 2.21 96 %            Physical Examination  · Constitutional  o Appearance  o : overweight, well developed, alert, in no acute distress  · Head and Face  o Head  o : normocephalic, atraumatic  · Ears, Nose, Mouth and Throat  o Ears  o :   § External Ears  § : external auditory canal appearance normal, no discharge present  § Otoscopic Examination  § : tympanic membranes pearly white/gray bilaterally  o Nose  o :   § External Nose  § : no lesions noted  § Nasopharynx  § : no discharge present  o Oral Cavity  o :   § Oral Mucosa  § : oral mucosa light pink  o Throat  o :   § Oropharynx  § : tonsils without exudate, no palatal petechiae  · Neck  o Inspection/Palpation  o : normal appearance, no masses or tenderness, trachea midline  o Thyroid  o : gland size normal, nontender, no nodules or masses present on palpation  · Respiratory  o Respiratory Effort  o : breathing unlabored  o Inspection of Chest  o : chest rise symmetric bilaterally  o Auscultation of Lungs  o : clear to auscultation bilaterally throughout inspiration and expiration  · Cardiovascular  o Heart  o :   § Auscultation of Heart  § : regular rate and rhythm, no murmurs, gallops or rubs  o Peripheral Vascular " System  o :   § Extremities  § : no edema  · Lymphatic  o Neck  o : no cervical lymphadenopathy, no supraclavicular lymphadenopathy  · Psychiatric  o Mood and Affect  o : mood normal, affect appropriate          Assessment  · Anemia     285.9/D64.9  · Essential hypertension     401.9/I10  · Hyperlipidemia     272.4/E78.5  · Obesity     278.00/E66.9  · Screening for depression     V79.0/Z13.89  · Need for influenza vaccination     V04.81/Z23  · History of prostate cancer     V10.46/Z85.46  · Lumbago/low back pain     724.3/M54.40  · Arthritis     716.90/M19.90  · High blood pressure     401.9/I10  · Prostate CA     185/C61  · Tinnitus     388.30/H93.19  · Renal insufficiency     593.9/N28.9  · Erectile dysfunction     607.84/N52.9  · Gout     274.9/M10.9  · Decreased hearing of both ears     389.9/H91.93      Plan  · Orders  o B12 Folate levels (B12FO) - 285.9/D64.9 - 10/15/2020  o Iron panel (iron, TIBC, transferrin saturation) (75950, 32976, 59827) - 285.9/D64.9 - 10/15/2020  o Retic (20263) - 285.9/D64.9 - 10/15/2020  o Ferritin ser/plas (33422) - 285.9/D64.9 - 10/15/2020  o Immunization Admin Fee (Single) ([facility]) (63930) - V04.81/Z23 - 10/15/2020  o Fluzone Quadrivalent Vaccine, age 6 months + (30567) - V04.81/Z23 - 10/15/2020   Vaccine - Influenza; Dose: 0.5; Site: Left Deltoid; Route: Intramuscular; Date: 10/15/2020 15:03:00; Exp: 06/30/2021; Lot: [id number]; Mfg: sanofi pasteur; TradeName: Fluzone Quadrivalent; Administered By: Jake Ulloa MA; Comment: pt tolerated well  o ACO-14: Influenza immunization administered or previously received () - V04.81/Z23 - 10/15/2020  o ACO-39: Current medications updated and reviewed (1159F, ) - - 10/15/2020  o ACO-18: Negative screen for clinical depression using a standardized tool () - - 10/15/2020  o Occult blood, fecal, guaiac, 1-3 samples [facility] (23577) - - 10/15/2020  o Uric Acid Serum [facility] (41878) - - 10/15/2020  o ENT CONSULTATION ([facility]) - -  10/15/2020  · Medications  o sildenafil 100 mg oral tablet   SIG: take 1 tablet (100 mg) by oral route once daily as needed approximately 1 hour before sexual activity   DISP: (30) Tablet with 1 refills  Prescribed on 10/15/2020     o Lotrel 5-20 mg oral capsule   SIG: take 1 capsule by oral route once daily for 30 days   DISP: (30) Capsule with 2 refills  Prescribed on 10/15/2020     o allopurinol 300 mg oral tablet   SIG: take 1 tablet (300 mg) by oral route once daily   DISP: (90) Tablet with 1 refills  Adjusted on 10/15/2020     o atorvastatin 20 mg oral tablet   SIG: take 1 tablet (20 mg) by oral route once daily at bedtime for 90 days   DISP: (90) Tablet with 1 refills  Adjusted on 10/15/2020     o metoprolol tartrate 100 mg oral tablet   SIG: take 1 tablet (100 mg) by oral route once daily with a meal for 90 days   DISP: (90) Tablet with 1 refills  Adjusted on 10/15/2020     o losartan-hydrochlorothiazide 100-25 mg oral tablet   SIG: take 1 tablet by oral route once daily   DISP: (0) tablet with 0 refills  Discontinued on 10/15/2020     o Medications have been Reconciled  o Transition of Care or Provider Policy  · Instructions  o Patient advised to monitor blood pressure (B/P) at home and journal readings. Patient informed that a B/P reading at home of more than 130/80 is considered hypertension. For readings greater [id number]/90 or higher patient is advised to follow up in the office with readings for management. Patient advised to limit sodium intake.  o Patient was educated and given low cholesterol diet information.  o Recommended exercise program to assist with cholesterol, weight loss and overall health improvement.  o Advised that cheeses and other sources of dairy fats, animal fats, fast food, and the extras (candy, pastries, pies, doughnuts and cookies) all contain LDL raising nutrients. Advised to increase fruits, vegetables, whole grains, and to monitor portion sizes.   o Depression Screen completed  and scanned into the EMR under the designated folder within the patient's documents.  o Today's PHQ-9 result is _2__  o Take all medications as prescribed/directed.  o Patient instructed/educated on their diet and exercise program.  o Patient was educated/instructed on their diagnosis, treatment and medications prior to discharge from the clinic today.  o Patient counseled to reduce calorie intake.  o Patient was instructed to exercise regularly.  o Discussed Covid-19 precautions including, but not limited to, social distancing, avoid touching your face, and hand washing.   · Disposition  o Call or Return if symptoms worsen or persist.  o F/U in 3 months.  o Care Transition            Electronically Signed by: Dion Self PA-C -Author on November 11, 2020 02:22:22 PM

## 2021-05-12 NOTE — PROGRESS NOTES
Quick Note      Patient Name: Dion Espana   Patient ID: [id number]   Sex: Male   YOB: 1955    Primary Care Provider: Foster Bui MD   Referring Provider: Foster Bui MD    Visit Date: April 30, 2020    Provider: Holley Perry PA-C   Location: [facility]   Location Address: [address] [place] [address]  [id number]   Location Phone: [phone]          History Of Present Illness  TELEHEALTH TELEPHONE VISIT  Chief Complaint: CHIEF COMPLAINT   Dion Espana is a 65 year old /White male who is presenting for evaluation via telehealth telephone visit. Verbal consent obtained before beginning visit.   Provider spent 5 minutes with the patient during telehealth visit.   The following staff were present during this visit: RAFY Amos   Past Medical History/Overview of Patient Symptoms     Still having lbp and bilateral leg pain but worse on the right.  Pain goes across the hip and into the thigh primarily.  Denies bowel/bladder incontinence.  Has recently failed two rounds of LESB.  He would like to consider surgery at this point.  He's had a prior left L3/4 MID by Dr. Ferrari in 2017 and did well after that surgery.  He has a small disc protrusion on the left at L3/4 and foraminal stenosis at L4/5 and L5/S1.           Assessment  · Lumbar foraminal stenosis     724.02/M48.061  · Lumbar disc displacement without myelopathy     722.10/M51.26  · Lumbar degenerative disc disease     722.52/M51.36  · Lumbar radiculopathy     724.4/M54.16      Plan  · Orders  o Physican Telephone evaluation, 5-10 min (11336) - - 04/30/2020  · Medications  o Medications have been Reconciled  o Transition of Care or Provider Policy  · Instructions  o Plan Of Care:   o Chronic conditions reviewed and taken into consideration for today's treatment plan.  o I will have him f/u with Dr. Ferrari to discuss surgical options since he has failed LESB.   · Associate Tasks  o Task  "ID [id number] \"''Provider to Nurse: needs appt. with Dr. Ferrari to discuss surgical options            Electronically Signed by: Holley Perry PA-C -Author on April 30, 2020 10:23:19 AM  "

## 2021-05-13 NOTE — PROGRESS NOTES
"   Progress Note      Patient Name: Dion Espana   Patient ID: [id number]   Sex: Male   YOB: 1955    Primary Care Provider: Foster Bui MD   Referring Provider: Foster Bui MD    Visit Date: July 27, 2020    Provider: Silvana Alonso MD   Location: [facility]   Location Address: [address] [place] [address]  [id number]   Location Phone: [phone]          Chief Complaint  · urologic issues      History Of Present Illness     63yo male s/p RALP with bilateral PLND for intermediate risk prostate cancer.   Surgery in Sept 2016  PSA 4.7  Biopsy - 3+4 L mid and 3+3 R base  cT1c    Final Path - Surgery in Sept.   Marely 3+4 ([facility])  [id number]  margins were negative    Patient is pad free. He has tried Viagra for erections with about 50% erection with this. We discussed ICI today and he is interested in this. I will provide him with hand out for that and he will contact me should he want to have that.     3/26/18 - Patient is doing well. He is pad free. He denies urinary issues. The patient had PSA in March 2017 that was 0.02. He had this repeated 6 months ago and was 0.02. Prior to this in Dec 2016, PSA was undetectable. I have recommended checking the PSA today. The patient does not want to do ICI. He says his erections \"come and go\".    7/2/18 - Patient is doing well. He presents in follow up. He is doing well. He is pad free. His most recent PSA in March was <0.01. We reviewed this. I have recommended continuing to follow the PSA and checking that today. For erections, we discussed ICI. He does not want to do that at this point, but will consider that going forward.    1/7/19 - Patient presents in follow up. He is doing well. His last PSA in July was 0.04. His doubling time was 10 months. He has not had a PSA since. He states that he is doing well. The patient is pad free. He would like to try Sildenafil 100mg for ED. We discussed side effects including HA, facial " "flushing, slight drop in BP and erection that lasts over 4 hours.    7/30/19 - Patient presents in follow up. He is doing well. He has not had a PSA since I last saw him in Jan 2019. His PSA at that time was 0.04. His PSA 1 year ago was 0.04. I would like to have that drawn. He is pad free. He is using Sildenafil and has HAs and some vision changes with this. He describes his erections as \"coming and going\".    1/27/20:  Patient is here for routine follow up.  He is doing well.  His PSA is less than 0.01 recently.  He is having no issues with incontinence and is happy today with no new issues.    7/27/20:  Patient is here for routine follow up.  He is doing well with no new issues.  His most recent PSA has not been done.       Past Medical History  Arthritis; Bone lesion; Cancer; High blood pressure; History of prostate cancer; Hyperlipidemia; Hypertension; Hypertension, Benign Essential; Limb Swelling; Lumbago; Lumbago/low back pain; Lumbar disc herniation, L3-4; Lumbar Spinal Stenosis; Paresthesia; Prostate CA; Prostate Disorder; Radiculopathy, lumbosacral; Sciatica         Past Surgical History  Back; Back surgery; Carpal Tunnel Release; Cholecstectomy; Cholecystecomy; Gallbladder; Hand surgery; Hand surgery, right; Joint Surgery; Knee surgery; Minimally invasive Discectomy; Prostate Surgery         Medication List  atorvastatin 20 mg oral tablet; losartan-hydrochlorothiazide 100-25 mg oral tablet; metoprolol tartrate 100 mg oral tablet; Omega 3-6-9 1,200 mg oral capsule         Allergy List  NO KNOWN DRUG ALLERGIES         Family Medical History  Stroke; Heart Disease; Hypertension; Diabetes, unspecified type; Heart Attack (MI); Diabetes; Family history of certain chronic disabling diseases; arthritis; Family history of Arthritis; Family history of stroke; Family history of heart disease; Family history of diabetes mellitus         Social History  Alcohol Use (Current some day); lives with spouse; ; " "Recreational Drug Use (Never); Retired; Tobacco (Never); Working         Review of Systems  · Constitutional  o Denies  o : fatigue, fever  · Cardiovascular  o Denies  o : chest pain, heart disease, heart attack  · Respiratory  o Denies  o : lung disease, shortness of breath, asthma  · Gastrointestinal  o Denies  o : stomach or bowel disease, blood in stools, ulcers  · Genitourinary  o Denies  o : frequent urination , kidney or bladder infections, urgency or urination, difficulty voiding, interrupted stream, urgency incontinence, urinary leakage, voiding at night, painful intercourse, painful urination, straining to urinate, sexual dysfunction, blood in urine, slow stream, kidney stone  · Neurologic  o Denies  o : neurologic disease  · Musculoskeletal  o Denies  o : swelling or pain in your lower extremities      Vitals  Date Time BP Position Site L\R Cuff Size HR RR TEMP (F) WT  HT  BMI kg/m2 BSA m2 O2 Sat        07/27/2020 09:19 /59 Sitting       212lbs 16oz 5'  11\" 29.71 2.2           Physical Examination  · Constitutional  o Appearance  o : well-nourished, well developed, alert, in no acute distress  · Head and Face  o Head  o :   § Inspection  § : atraumatic, normocephalic  o Face  o :   § Inspection  § : no facial lesions  · Eyes  o Sclerae  o : sclerae white  · Ears, Nose, Mouth and Throat  o Ears  o :   § External Ears  § : appearance within normal limits, no lesions present  o Nose  o :   § External Nose  § : appearance normal  · Neck  o Inspection/Palpation  o : normal appearance, no masses or tenderness, trachea midline  · Respiratory  o Respiratory Effort  o : breathing unlabored  · Gastrointestinal  o Abdominal Examination  o : abdomen nontender to palpation, tone normal without rigidity or guarding, no masses present, abdominal contour scaphoid  · Skin and Subcutaneous Tissue  o General Inspection  o : no rashes or lesions present, no lesions present, no areas of " discoloration  · Neurologic  o Mental Status Examination  o :   § Orientation  § : grossly oriented to person, place and time  § Speech/Language  § : communication ability within normal limits  o Gait and Station  o : normal gait, able to stand without difficulty  · Psychiatric  o Judgement and Insight  o : judgment and insight intact, judgement for everyday activities and social situations within normal limits, insight intact  o Mood and Affect  o : mood normal, affect appropriate          Results  · In-Office Procedures  o Lab procedure  § Automated dipstick urinalysis with microscopy (93678)   § Color Ur: Yellow   § Clarity Ur: Clear   § Glucose Ur Ql Strip: Negative   § Bilirub Ur Ql Strip: Small   § Ketones Ur Ql Strip: Trace   § Sp Gr Ur Qn: 1.025   § Hgb Ur Ql Strip: Negative   § pH Ur-LsCnc: 5.5   § Prot Ur Ql Strip: 30   § Urobilinogen Ur Strip-mCnc: 0.2   § Nitrite Ur Ql Strip: Negative   § WBC Est Ur Ql Strip: Negative       Assessment  · Prostate CA     185/C61      Plan  · Orders  o PSA ultrasensitive DIAGNOSTIC [facility] ([id number], 78989, 66715) - 185/C61 - 07/27/2020  o PSA ultrasensitive DIAGNOSTIC [facility] ([id number]) - 185/C61 - 01/27/2021  · Medications  o Medications have been Reconciled  o Transition of Care or Provider Policy  · Instructions  o Check PSA and F/U in 6 months.   o Electronically Identified Patient Education Materials Provided Electronically            Electronically Signed by: Silvana Alonso MD -Author on July 27, 2020 09:34:05 AM

## 2021-05-13 NOTE — PROGRESS NOTES
Progress Note      Patient Name: Dion Espana   Patient ID: [id number]   Sex: Male   YOB: 1955    Primary Care Provider: Foster Bui MD   Referring Provider: Foster Bui MD    Visit Date: July 23, 2020    Provider: Holley Perry PA-C   Location: [facility]   Location Address: [address] [place] [address]  [id number]   Location Phone: [phone]          Chief Complaint     Post op 3 weeks          History Of Present Illness     Here for three week post op visit s/p a right L4/5 lateral recess decompression with foraminotomy. Denies fever or drainage from the wound. His right leg pain and paresthesias have resolved.  Some mild soreness in the back.       Past Medical History  Arthritis; Bone lesion; Cancer; High blood pressure; History of prostate cancer; Hyperlipidemia; Hypertension; Hypertension, Benign Essential; Limb Swelling; Lumbago; Lumbago/low back pain; Lumbar disc herniation, L3-4; Lumbar Spinal Stenosis; Paresthesia; Prostate CA; Prostate Disorder; Radiculopathy, lumbosacral; Sciatica         Past Surgical History  Back; Back surgery; Carpal Tunnel Release; Cholecstectomy; Cholecystecomy; Gallbladder; Hand surgery; Hand surgery, right; Joint Surgery; Knee surgery; Minimally invasive Discectomy; Prostate Surgery         Medication List  atorvastatin 20 mg oral tablet; losartan-hydrochlorothiazide 100-25 mg oral tablet; metoprolol tartrate 100 mg oral tablet; Omega 3-6-9 1,200 mg oral capsule         Allergy List  NO KNOWN DRUG ALLERGIES       Allergies Reconciled  Family Medical History  Stroke; Heart Disease; Hypertension; Diabetes, unspecified type; Heart Attack (MI); Diabetes; Family history of certain chronic disabling diseases; arthritis; Family history of Arthritis; Family history of stroke; Family history of heart disease; Family history of diabetes mellitus         Social History  Alcohol (Never); Alcohol Use (Current some day); lives with  "spouse; ; .; Recreational Drug Use (Never); Retired; Tobacco (Never); Working         Review of Systems  · Constitutional  o Denies  o : chills, excessive sweating, fatigue, fever, sycope/passing out, weight gain, weight loss  · Eyes  o Denies  o : changes in vision, blurry vision, double vision  · HENT  o Denies  o : loss of hearing, ringing in the ears, ear aches, sore throat, nasal congestion, sinus pain, nose bleeds, seasonal allergies  · Cardiovascular  o Denies  o : blood clots, swollen legs, anemia, easy burising or bleeding, transfusions  · Respiratory  o Denies  o : shortness of breath, dry cough, productive cough, pneumonia, COPD  · Gastrointestinal  o Denies  o : difficulty swallowing, reflux  · Genitourinary  o Denies  o : incontinence  · Neurologic  o Denies  o : headache, seizure, stroke, tremor, loss of balance, falls, dizziness/vertigo, difficulty with sleep, numbness/tingling/paresthesia , difficulty with coordination, difficulty with dexterity, weakness  · Musculoskeletal  o Admits  o : low back pain  o Denies  o : neck stiffness/pain, swollen lymph nodes, muscle aches, joint pain, weakness, spasms, sciatica, pain radiating in arm, pain radiating in leg  · Endocrine  o Denies  o : diabetes, thyroid disorder  · Psychiatric  o Denies  o : anxiety, depression  · All Others Negative      Vitals  Date Time BP Position Site L\R Cuff Size HR RR TEMP (F) WT  HT  BMI kg/m2 BSA m2 O2 Sat        07/23/2020 01:47 PM        97.3 212lbs 6oz 5'  11\" 29.62 2.2           Physical Examination     lumbar incision has healed with very small seroma.  Gait and station are wnl.           Assessment  · Lumbago/low back pain     724.3/M54.40  · Lumbar Spinal Stenosis     724.02/M48.06  L4-5 most notably (now s/p surgery)  · Paresthesia     782.0/R20.2  · Radiculopathy, lumbosacral     724.4/M54.16  Difficult to pinpoint the dermatome    Problems Reconciled  Plan  · Medications  o Medications have been " Reconciled  o Transition of Care or Provider Policy  · Instructions  o I will see him back as needed. Slowly increase activity.             Electronically Signed by: SUSIE AmsoC -Author on July 23, 2020 02:33:00 PM

## 2021-05-13 NOTE — PROGRESS NOTES
"   Progress Note      Patient Name: Dion Espana   Patient ID: [id number]   Sex: Male   YOB: 1955    Primary Care Provider: Foster Bui MD   Referring Provider: Foster Bui MD    Visit Date: May 21, 2020    Provider: Vern Ferrari MD   Location: [facility]   Location Address: [address] [place] [address]  [id number]   Location Phone: [phone]          Chief Complaint  · Follow-up  · Surgical History and Physical     Here with complaints of low back and right hip/leg pain.       History Of Present Illness  The patient is a 65 year old /White male who is in the office for followup appointment. He had a previous left L3-4 discectomy. He has right leg pain now. The leg pain is about equal to the back pain. The legs goes \"dead\" with standing. He had 2 injections with no improvement.       Past Medical History  Arthritis; Bone lesion; Cancer; High blood pressure; History of prostate cancer; Hyperlipemia; Hyperlipidemia; Hypertension; Hypertension, Benign Essential; Limb Swelling; Lumbago; Lumbago/low back pain; Lumbar disc herniation, L3-4; Lumbar Spinal Stenosis; Paresthesia; Prostate CA; Prostate Disorder; Radiculopathy, lumbosacral; Sciatica         Past Surgical History  Back; Back surgery; Carpal Tunnel Release; Cholecstectomy; Cholecystecomy; Gallbladder; Hand surgery; Hand surgery, right; Joint Surgery; Knee surgery; Minimally invasive Discectomy; Prostate Surgery         Medication List  atorvastatin 20 mg oral tablet; losartan-hydrochlorothiazide 100-25 mg oral tablet; metoprolol tartrate 100 mg oral tablet; Omega 3-6-9 1,200 mg oral capsule         Allergy List  NO KNOWN DRUG ALLERGIES       Allergies Reconciled  Family Medical History  Stroke; Heart Disease; Hypertension; Diabetes, unspecified type; Heart Attack (MI); Diabetes; Family history of certain chronic disabling diseases; arthritis; Family history of Arthritis; Family history of stroke; Family " "history of heart disease; Family history of diabetes mellitus         Social History  Alcohol (Never); Alcohol Use (Current some day); lives with spouse; ; .; Recreational Drug Use (Never); Retired; Tobacco (Never); Working         Review of Systems  · Constitutional  o Denies  o : chills, excessive sweating, fatigue, fever, sycope/passing out, weight gain, weight loss  · Eyes  o Denies  o : changes in vision, blurry vision, double vision  · HENT  o Admits  o : ringing in the ears  o Denies  o : loss of hearing, ear aches, sore throat, nasal congestion, sinus pain, nose bleeds, seasonal allergies  · Cardiovascular  o Denies  o : blood clots, swollen legs, anemia, easy burising or bleeding, transfusions  · Respiratory  o Denies  o : shortness of breath, dry cough, productive cough, pneumonia, COPD  · Gastrointestinal  o Denies  o : difficulty swallowing, reflux  · Genitourinary  o Denies  o : incontinence  · Neurologic  o Admits  o : numbness/tingling/paresthesia   o Denies  o : headache, seizure, stroke, tremor, loss of balance, falls, dizziness/vertigo, difficulty with sleep, difficulty with coordination, difficulty with dexterity, weakness  · Musculoskeletal  o Admits  o : pain radiating in leg, low back pain  o Denies  o : neck stiffness/pain, swollen lymph nodes, muscle aches, joint pain, weakness, spasms, sciatica, pain radiating in arm  · Endocrine  o Denies  o : diabetes, thyroid disorder  · Psychiatric  o Denies  o : anxiety, depression      Vitals  Date Time BP Position Site L\R Cuff Size HR RR TEMP (F) WT  HT  BMI kg/m2 BSA m2 O2 Sat        05/21/2020 03:25 PM        98 215lbs 8oz 5'  11\" 30.06 2.21           Physical Examination  · Constitutional  o Appearance  o : well-nourished, well developed, alert, in no acute distress  · Respiratory  o Respiratory Effort  o : breathing unlabored  · Cardiovascular  o Peripheral Vascular System  o :   § Extremities  § : no cyanosis, clubbing or " edema  · Psychiatric  o Mood and Affect  o : mood normal, affect appropriate              Assessment  · Lumbago/low back pain     724.3/M54.40  · Lumbar Spinal Stenosis     724.02/M48.06  L4-5 most notably  · Paresthesia     782.0/R20.2  · Radiculopathy, lumbosacral     724.4/M54.16  Difficult to pinpoint the dermatome  · Preoperative examination     V72.84/Z01.818      Plan  · Medications  o Medications have been Reconciled  o Transition of Care or Provider Policy  · Instructions  o The ROS and the PFSH were reviewed at today's visit.  o Call or return to office if symptoms worsen or persist.   o He could potentially benefit from a right approach for L4-5 lateral recess decompression and foraminotomy. We discussed that this would not help the lower back pain.   o ****Surgical Orders****  o Outpatient  o RISK AND BENEFITS:  o Possible risks/complications, benefits and alternatives to surgical or invasive procedure have been explained to the patient and/or legal guardian.  o PREP: Per protocol;  o IV: Per Anesthesia;  o *******************************************  o PRE- OP MEDICATION ORDER:  o *******************************************  o Kefzol 2 grams IV on call to OR.  o ***************  o Date of Surgical Procedure:   o Please sign permit for:  o Minimally Invasive Lateral Decompression and Foraminotomy, right approach  o lumbar four to lumbar five  o The above History and Physical must have been completed within 30 days of admission.            Electronically Signed by: Vern Ferrari MD -Author on May 21, 2020 04:17:12 PM

## 2021-05-13 NOTE — PROGRESS NOTES
Progress Note      Patient Name: Dion Espana   Patient ID: [id number]   Sex: Male   YOB: 1955    Primary Care Provider: Dion Self PA-C   Referring Provider: Dion Self PA-C    Visit Date: December 14, 2020    Provider: Chau Rosas MD   Location: [facility]   Location Address: [address] [place] [address], [address] [place] [address]  [id number]   Location Phone: [phone]          History Of Present Illness  TELEHEALTH TELEPHONE VISIT  Dion Espana is a 65 year old /White male who is presenting for evaluation via telehealth telephone visit. Verbal consent obtained before beginning visit. He was originally seen on 11/16/2020 for evaluation right greater than left hearing loss, high-pitched tinnitus, and intermittent ache otalgia. He was prescribed diclofenac as he was quite tender to palpation over his left masseter muscle. He tells me he has not had as much achy otalgia since that time but it does come and go. He feels like his hearing and tinnitus are stable. He is not having any issues with otorrhea or vertigo. Fortunately, the MRI of his internal auditory canals with and without contrast on 12/10/2020 did not reveal any retrocochlear abnormality aside from likely small vessel ischemic changes. A high riding jugular bulb was noted on the right. We discussed that he is cleared for binaural amplification but that it will likely only be of assistance in his left ear given his poor speech discrimination on the right. He was given ample time to ask questions, all of which were answered to his satisfaction. He may follow-up on an as-needed basis.   Provider spent 4 minutes with the patient during the telehealth visit.   The following staff were present during this visit: Chau Rosas MD   Past Medical History/ Overview of Patient Symptoms          Assessment  · Asymmetrical sensorineural hearing loss     389.16/H90.5  · Tinnitus of both  ears     388.30/H93.13      Plan  · Instructions  o Plan Of Care:             Electronically Signed by: Chau Rosas MD -Author on December 14, 2020 05:16:42 PM

## 2021-05-14 VITALS — BODY MASS INDEX: 30.24 KG/M2 | WEIGHT: 216 LBS | HEIGHT: 71 IN | TEMPERATURE: 98.1 F

## 2021-05-14 VITALS
OXYGEN SATURATION: 96 % | WEIGHT: 213.37 LBS | HEART RATE: 63 BPM | DIASTOLIC BLOOD PRESSURE: 71 MMHG | SYSTOLIC BLOOD PRESSURE: 126 MMHG | BODY MASS INDEX: 29.87 KG/M2 | HEIGHT: 71 IN

## 2021-05-14 VITALS
OXYGEN SATURATION: 96 % | SYSTOLIC BLOOD PRESSURE: 154 MMHG | DIASTOLIC BLOOD PRESSURE: 71 MMHG | HEIGHT: 71 IN | HEART RATE: 48 BPM | WEIGHT: 215.19 LBS | BODY MASS INDEX: 30.13 KG/M2

## 2021-05-14 VITALS
DIASTOLIC BLOOD PRESSURE: 58 MMHG | SYSTOLIC BLOOD PRESSURE: 124 MMHG | WEIGHT: 210 LBS | HEIGHT: 71 IN | BODY MASS INDEX: 29.4 KG/M2

## 2021-05-14 NOTE — PROGRESS NOTES
Progress Note      Patient Name: Dion Espana   Patient ID: [id number]   Sex: Male   YOB: 1955    Primary Care Provider: Dion Self PA-C   Referring Provider: Dion Self PA-C    Visit Date: January 15, 2021    Provider: Dion Self PA-C   Location: [facility]   Location Address: [address] [place] [address], [address]  [place], [place]  [id number]   Location Phone: [phone]          Chief Complaint  · 3 month follow up  · sinnus issue  · LBP  · spots on left side collar bone      History Of Present Illness  Dion Espana is a 65 year old /White male who presents for evaluation and treatment of: 3 month follow up.      pt presents today for 3 month follow up.    pt c/o of sinus issues for the last 2 months. pt states he has pressure in i=his ears and temples. pt states does have sore throat first thing in the am but does go away; denies any fever or soa.    pt states he has couple spots on collar bone(left side) that itch. They seem to be rough and raised on skin. pt has seen Dr Jackman in the past.    pt states he has been having LBP surgery since his surgery in July 2019. pt states the pain stays in the same area.     Labs 8/20  PSA 1/21  flu 10/20  HTN - good control - on lotrel 5/20 and metoprolol  Gout - good control - on allopurinol  Hyperlipidemia - good control - atorvastatin 20mg          Past Medical History  Disease Name Date Onset Notes   Arthritis --  --    Bone lesion 05/21/2020 --    Diverticulitis --  --    Essential hypertension 10/15/2020 --    Gout 10/15/2020 --    Hearing loss --  --    Hemorrhoids --  --    History of prostate cancer 05/21/2020 --    Hyperlipidemia 10/15/2020 --    Hypertension, Benign Essential --  --    Lumbago/low back pain 07/13/2017 --    Lumbar disc herniation, L3-4 07/13/2017 left   Lumbar Spinal Stenosis 05/21/2020 --    Paresthesia 05/21/2020 --    Radiculopathy, lumbosacral 05/21/2020 --    Renal insufficiency  10/15/2020 --    Sciatica 07/13/2017 --    Tinnitus --  --          Past Surgical History  Procedure Name Date Notes   Back surgery 2017 left L3/4 MID by Dr. Ferrari   Carpal Tunnel Release --  --    Cholecystecomy --  --    Colonoscopy 2006 Dr. Sebastian   Knee surgery --  --    Minimally invasive Discectomy 7- L3-4 Dr. Ferrari   Prostate Surgery --  --    Thumb surgery --  --          Medication List  Name Date Started Instructions   allopurinol 300 mg oral tablet 01/15/2021 take 1 tablet (300 mg) by oral route once daily   atorvastatin 20 mg oral tablet 01/15/2021 take 1 tablet (20 mg) by oral route once daily at bedtime for 90 days   krill oil 500 mg oral capsule  take 1 capsule by oral route daily   Lotrel 5-20 mg oral capsule 01/15/2021 take 1 capsule by oral route once daily for 90 days   metoprolol tartrate 100 mg oral tablet 01/15/2021 take 1 tablet (100 mg) by oral route once daily with a meal for 90 days         Allergy List  Allergen Name Date Reaction Notes   NO KNOWN DRUG ALLERGIES --  --  --        Allergies Reconciled  Family Medical History  Disease Name Relative/Age Notes   No family history of colorectal cancer  --    Family history of Arthritis Mother/   Mother   Family history of stroke Brother/  Mother/   --    Family history of heart disease Father/  Mother/   --    Family history of diabetes mellitus Mother/   Mother         Social History  Finding Status Start/Stop Quantity Notes   Alcohol Light --/-- --  --    lives with children --  --/-- --  --    lives with spouse --  --/-- --  --     --  --/-- --  --    Recreational Drug Use Never --/-- --  no   Retired --  --/-- --  --    Tobacco Never --/-- --  --          Immunizations  NameDate Admin Mfg Trade Name Lot Number Route Inj VIS Given VIS Publication   Sunwekwcb98/15/2020 PMC Fluzone Quadrivalent [id number] IM LD 10/15/2020 08/15/2019   Comments: pt tolerated well         Review of Systems  · Constitutional  o Denies  o :  "fever, fatigue, weight loss, weight gain  · Cardiovascular  o Denies  o : lower extremity edema, claudication, chest pressure, palpitations  · Respiratory  o Denies  o : shortness of breath, wheezing, cough, hemoptysis, dyspnea on exertion  · Gastrointestinal  o Denies  o : nausea, vomiting, diarrhea, constipation, abdominal pain      Vitals  Date Time BP Position Site L\R Cuff Size HR RR TEMP (F) WT  HT  BMI kg/m2 BSA m2 O2 Sat FR L/min FiO2 HC       01/15/2021 02:23 /71 Sitting    63 - R   213lbs 6oz 5'  11\" 29.76 2.2 96 %            Physical Examination  · Constitutional  o Appearance  o : well developed, well-nourished, no acute distress  · Head and Face  o Head  o : normocephalic, atraumatic  · Ears, Nose, Mouth and Throat  o Ears  o :   § External Ears  § : external auditory canal appearance normal, no discharge present  § Otoscopic Examination  § : tympanic membranes pearly white/gray bilaterally  o Nose  o :   § External Nose  § : no lesions noted  § Nasopharynx  § : no discharge present  o Oral Cavity  o :   § Oral Mucosa  § : oral mucosa light pink  o Throat  o :   § Oropharynx  § : tonsils without exudate, no palatal petechiae  · Neck  o Inspection/Palpation  o : normal appearance, no masses or tenderness, trachea midline  o Thyroid  o : gland size normal, nontender, no nodules or masses present on palpation  · Respiratory  o Respiratory Effort  o : breathing unlabored  o Inspection of Chest  o : chest rise symmetric bilaterally  o Auscultation of Lungs  o : clear to auscultation bilaterally throughout inspiration and expiration  · Cardiovascular  o Heart  o :   § Auscultation of Heart  § : regular rate and rhythm, no murmurs, gallops or rubs  o Peripheral Vascular System  o :   § Extremities  § : no edema  · Lymphatic  o Neck  o : no cervical lymphadenopathy, no supraclavicular lymphadenopathy  · Psychiatric  o Mood and Affect  o : mood normal, affect appropriate     Skin - erythematous left chest " wall    BACK - palp tend along the lumbar spine           Assessment  · Hyperlipidemia     272.4/E78.5  · Sinusitis, acute     461.9/J01.90  · Need for influenza vaccination     V04.81/Z23  · Gout     274.9/M10.9  · History of prostate cancer     V10.46/Z85.46  · Lumbago/low back pain     724.3/M54.40  · Lumbar Spinal Stenosis     724.02/M48.06  · Renal insufficiency     593.9/N28.9  · Hypertension, Benign Essential     401.1/I10  · Skin lesion of chest wall     709.9/L98.9      Plan  · Orders  o ACO-14: Influenza immunization was not administered for reasons documented () - V04.81/Z23 - 01/15/2021   rcvd 10/20  o Free T4 (25157) - 401.1/I10, 272.4/E78.5 - 01/15/2021  o Physical, Primary Care Panel (CBC, CMP, Lipid, TSH) [facility] (10521, 83252, 05079, 78713) - 401.1/I10, 272.4/E78.5 - 01/15/2021  o Urinalysis with Reflex Microscopy ([facility]) (96648) - 401.1/I10, 272.4/E78.5 - 01/15/2021  o Vitamin D (25-Hydroxy) Level (57803) - 401.1/I10, 272.4/E78.5 - 01/15/2021  o ACO-39: Current medications updated and reviewed (, 1159F) - - 01/15/2021  o DERMATOLOGY CONSULTATION (DERMA) - 709.9/L98.9 - 01/15/2021  o Uric Acid Serum [facility] (61710) - 274.9/M10.9 - 01/15/2021  o 6.00 - Kenalog Injection 60mg (-3) - - 01/15/2021   Injection - Kenalog 60 mg; Dose: 60 mg; Site: Left Gluteus; Route: intramuscular; Date: 01/15/2021 14:58:22; Exp: 11/21/2021; Lot: [id number]; Mfg: "[id number] TradeName: triamcinolone; Location[place] [facility] [place]; Administered By: Beti Arango MA; Comment: pt tolerated well  · Medications  o Lidoderm 5 % topical adhesive patch,medicated   SIG: apply 1 patch by transdermal route once daily (May wear up to 12hours.)   DISP: (30) Patch with 11 refills  Prescribed on 01/15/2021     o Lotrel 5-20 mg oral capsule   SIG: take 1 capsule by oral route once daily for 90 days   DISP: (90) Capsule with 1 refills  Adjusted on 01/15/2021     o metoprolol tartrate 100 mg oral tablet   SIG: take  1 tablet (100 mg) by oral route once daily with a meal for 90 days   DISP: (90) Tablet with 1 refills  Adjusted on 01/15/2021     o allopurinol 300 mg oral tablet   SIG: take 1 tablet (300 mg) by oral route once daily   DISP: (90) Tablet with 1 refills  Refilled on 01/15/2021     o atorvastatin 20 mg oral tablet   SIG: take 1 tablet (20 mg) by oral route once daily at bedtime for 90 days   DISP: (90) Tablet with 1 refills  Refilled on 01/15/2021     o Medications have been Reconciled  o Transition of Care or Provider Policy  · Instructions  o Patient was educated and given low cholesterol diet information.  o Recommended exercise program to assist with cholesterol, weight loss and overall health improvement.  o Advised that cheeses and other sources of dairy fats, animal fats, fast food, and the extras (candy, pastries, pies, doughnuts and cookies) all contain LDL raising nutrients. Advised to increase fruits, vegetables, whole grains, and to monitor portion sizes.   o Flu vaccine declined.  o Take all medications as prescribed/directed.  o Patient instructed/educated on their diet and exercise program.  o Patient was educated/instructed on their diagnosis, treatment and medications prior to discharge from the clinic today.  o Patient counseled to reduce calorie intake.  o Patient was instructed to exercise regularly.  o Discussed Covid-19 precautions including, but not limited to, social distancing, avoid touching your face, and hand washing.   · Disposition  o Call or Return if symptoms worsen or persist.  o F/U in 6 months  o Care Transition            Electronically Signed by: Dion Self PA-C -Author on January 16, 2021 05:07:07 PM

## 2021-05-15 VITALS — BODY MASS INDEX: 29.12 KG/M2 | HEART RATE: 65 BPM | OXYGEN SATURATION: 97 % | HEIGHT: 71 IN | WEIGHT: 208 LBS

## 2021-05-15 VITALS — WEIGHT: 215.5 LBS | TEMPERATURE: 98 F | HEIGHT: 71 IN | BODY MASS INDEX: 30.17 KG/M2

## 2021-05-15 VITALS
SYSTOLIC BLOOD PRESSURE: 130 MMHG | BODY MASS INDEX: 29.75 KG/M2 | HEIGHT: 71 IN | WEIGHT: 212.5 LBS | DIASTOLIC BLOOD PRESSURE: 73 MMHG

## 2021-05-15 VITALS — WEIGHT: 212.37 LBS | BODY MASS INDEX: 29.73 KG/M2 | HEIGHT: 71 IN | TEMPERATURE: 97.3 F

## 2021-05-15 VITALS — BODY MASS INDEX: 29.4 KG/M2 | RESPIRATION RATE: 16 BRPM | HEIGHT: 71 IN | WEIGHT: 210 LBS

## 2021-05-15 VITALS
BODY MASS INDEX: 29.82 KG/M2 | DIASTOLIC BLOOD PRESSURE: 59 MMHG | HEIGHT: 71 IN | WEIGHT: 213 LBS | SYSTOLIC BLOOD PRESSURE: 108 MMHG

## 2021-05-15 VITALS — BODY MASS INDEX: 29.26 KG/M2 | WEIGHT: 209 LBS | RESPIRATION RATE: 16 BRPM | HEIGHT: 71 IN

## 2021-05-15 VITALS
BODY MASS INDEX: 30.1 KG/M2 | SYSTOLIC BLOOD PRESSURE: 117 MMHG | HEIGHT: 71 IN | WEIGHT: 215 LBS | DIASTOLIC BLOOD PRESSURE: 51 MMHG

## 2021-05-15 VITALS — HEIGHT: 71 IN | WEIGHT: 209.37 LBS | OXYGEN SATURATION: 96 % | BODY MASS INDEX: 29.31 KG/M2 | HEART RATE: 69 BPM

## 2021-05-16 VITALS — WEIGHT: 210 LBS | HEIGHT: 71 IN | BODY MASS INDEX: 29.4 KG/M2 | RESPIRATION RATE: 16 BRPM

## 2021-05-16 VITALS — RESPIRATION RATE: 15 BRPM | HEIGHT: 71 IN | BODY MASS INDEX: 27.86 KG/M2 | WEIGHT: 199 LBS

## 2021-05-17 ENCOUNTER — OFFICE VISIT CONVERTED (OUTPATIENT)
Dept: OTOLARYNGOLOGY | Facility: CLINIC | Age: 66
End: 2021-05-17
Attending: OTOLARYNGOLOGY

## 2021-06-05 NOTE — PROGRESS NOTES
"   Progress Note      Patient Name: Dion Espana   Patient ID: [id number]   Sex: Male   YOB: 1955    Primary Care Provider: Dion Self PA-C   Referring Provider: Dion Self PA-C    Visit Date: May 17, 2021    Provider: Chau Rosas MD   Location: [facility]   Location Address: [address] [place] [address], [address] [place] [address]  [id number]   Location Phone: [phone]          Chief Complaint     I am doing okay.\"       History Of Present Illness     Dion Espana is a 65 year old /White male with past medical history significant for hypertension and hyperlipidemia who returns today for follow-up of bilateral sensorineural hearing loss. He was originally seen on 11/16/2020 for evaluation right greater than left hearing loss, high-pitched tinnitus, and intermittent ache otalgia. Same-day audiogram revealed left normal downsloping to moderately severe sensorineural hearing loss rising back to normal and right normal downsloping to severe sensorineural hearing loss rising. Both of these were in a cookie bite pattern. SRT is 45 on the right and 20 on the left. Speech discrimination is 64% on the right at 75 dB and 96% on the left at 60 dB. Tympanograms are type A. He was prescribed diclofenac as he was quite tender to palpation over his left masseter muscle. MRI of his internal auditory canals with and without contrast on 12/10/2020 did not reveal any retrocochlear abnormality aside from likely small vessel ischemic changes. A high riding jugular bulb was noted on the right.     We last spoke on 12/14/2020 at which time he was doing well.  He tells me that since that time he feels like his hearing and tinnitus have been stable.  He has not had any issues with otorrhea or vertigo.  He does report continued intermittent otalgia.  He has been wearing a amplification device in the right ear which was designed for hunting.  He feels like it amplifies a lot of the " background noise.  Audiogram on 5/17/2021 revealed right normal downsloping to severe rising to normal sensorineural hearing loss on the left normal sloping to moderate rising to normal sensorineural hearing loss.  SRT is 15 on the right and 10 on the left.  Speech discrimination was 48% on the right at 80 dB and 92% on the left at 60 dB.  Tympanograms are type A.       Past Medical History  Arthritis; Bone lesion; Diverticulitis; Essential hypertension; Gout; Hearing loss; Hemorrhoids; History of prostate cancer; Hyperlipidemia; Hypertension, Benign Essential; Lumbago/low back pain; Lumbar disc herniation, L3-4; Lumbar Spinal Stenosis; Paresthesia; Radiculopathy, lumbosacral; Renal insufficiency; Sciatica; Tinnitus         Past Surgical History  Back surgery; Carpal Tunnel Release; Cholecystecomy; Colonoscopy; Knee surgery; Minimally invasive Discectomy; Prostate Biopsy; Prostate Surgery; Thumb surgery         Medication List  allopurinol 300 mg oral tablet; atorvastatin 20 mg oral tablet; krill oil 500 mg oral capsule; Lidoderm 5 % topical adhesive patch,medicated; Lotrel 5-20 mg oral capsule; metoprolol tartrate 100 mg oral tablet         Allergy List  NO KNOWN DRUG ALLERGIES         Family Medical History  No family history of colorectal cancer; Family history of Arthritis; Family history of stroke; Family history of heart disease; Family history of diabetes mellitus         Social History  Alcohol (Light); lives with children; lives with spouse; ; Recreational Drug Use (Never); Retired; Tobacco (Never)         Immunizations  Name Date Admin   Influenza 10/15/2020         Review of Systems  · Constitutional  o Denies  o : fever, night sweats, weight loss  · Eyes  o Denies  o : discharge from eye, impaired vision  · HENT  o Admits  o : *See HPI  · Cardiovascular  o Denies  o : chest pain, irregular heart beats  · Respiratory  o Denies  o : shortness of breath, wheezing, coughing up  "blood  · Gastrointestinal  o Denies  o : heartburn, reflux, vomiting blood  · Genitourinary  o Denies  o : frequency  · Integument  o Denies  o : rash, skin dryness  · Neurologic  o Denies  o : seizures, loss of balance, loss of consciousness, dizziness  · Endocrine  o Denies  o : cold intolerance, heat intolerance  · Heme-Lymph  o Denies  o : easy bleeding, anemia      Vitals  Date Time BP Position Site L\R Cuff Size HR RR TEMP (F) WT  HT  BMI kg/m2 BSA m2 O2 Sat FR L/min FiO2        05/17/2021 11:42 AM        97.9 210lbs 0oz 5'  11\" 29.29 2.18             Physical Examination  · Constitutional  o Appearance  o : well developed, well-nourished, alert and in no acute distress, voice clear and strong  · Head and Face  o Head  o :   § Inspection  § : no deformities or lesions  o Face  o :   § Inspection  § : No facial lesions; House-Brackmann I/VI bilaterally  § Palpation  § : TMJ crepitus without  muscle tenderness bilaterally  · Eyes  o Vision  o :   § Visual Fields  § : Extraocular movements are intact. No spontaneous or gaze-induced nystagmus.  o Conjunctivae  o : clear  o Sclerae  o : clear  o Pupils and Irises  o : pupils equal, round, and reactive to light.   · Ears, Nose, Mouth and Throat  o Ears  o :   § External Ears  § : appearance within normal limits, no lesions present  § Otoscopic Examination  § : tympanic membrane appearance within normal limits bilaterally without perforations, well-aerated middle ears  § Hearing  § : intact to conversational voice both ears  o Nose  o :   § External Nose  § : appearance normal  § Intranasal Exam  § : mucosa within normal limits, vestibules normal, no intranasal lesions present, septum midline, sinuses non tender to percussion  o Oral Cavity  o :   § Oral Mucosa  § : oral mucosa normal without pallor or cyanosis  § Lips  § : lip appearance normal  § Teeth  § : normal dentition for age  § Gums  § : gums pink, non-swollen, no bleeding present  § Tongue  § : " tongue appearance normal; normal mobility  § Palate  § : hard palate normal, soft palate appearance normal with symmetric mobility  o Throat  o :   § Oropharynx  § : no inflammation or lesions present, tonsils within normal limits  · Neck  o Inspection/Palpation  o : normal appearance, no masses or tenderness, trachea midline; thyroid size normal, nontender, no nodules or masses present on palpation  · Respiratory  o Respiratory Effort  o : breathing unlabored  · Lymphatic  o Neck  o : no lymphadenopathy present  o Supraclavicular Nodes  o : no lymphadenopathy present  o Preauricular Nodes  o : no lymphadenopathy present  · Skin and Subcutaneous Tissue  o General Inspection  o : Regarding face and neck - there are no rashes present, no lesions present, and no areas of discoloration  · Neurologic  o Cranial Nerves  o : cranial nerves II-XII are grossly intact bilaterally  o Gait and Station  o : normal gait, able to stand without diffculty  · Psychiatric  o Judgement and Insight  o : judgment and insight intact  o Mood and Affect  o : mood normal, affect appropriate          Assessment  · Sensorineural hearing loss (SNHL), bilateral     389.18/H90.3  · Tinnitus, bilateral     388.30/H93.13      Plan  · Medications  o Medications have been Reconciled  o Transition of Care or Provider Policy  · Instructions  o Impressions and findings were discussed Mr. Justin at great length. Currently, he is doing well and occasionally wearing an over-the-counter hearing assist device on the right. We reviewed the result of his 5/17/2021 audiogram which revealed essentially stable bilateral sensorineural hearing loss worse on the right than the left. We again discussed the pathophysiology and natural history of this condition and he is cleared for left-sided amplification if he so desires. We discussed that given his poor speech discrimination on the right he is unlikely to benefit from any hearing aid on that  side.            Electronically Signed by: Chau Rosas MD -Author on May 17, 2021 12:48:28 PM

## 2021-07-06 RX ORDER — AMLODIPINE BESYLATE AND BENAZEPRIL HYDROCHLORIDE 5; 20 MG/1; MG/1
1 CAPSULE ORAL DAILY
COMMUNITY
End: 2021-07-15 | Stop reason: SDUPTHER

## 2021-07-14 PROBLEM — M51.26 DISPLACEMENT OF LUMBAR INTERVERTEBRAL DISC WITHOUT MYELOPATHY: Status: ACTIVE | Noted: 2017-07-13

## 2021-07-14 PROBLEM — I10 BENIGN ESSENTIAL HYPERTENSION: Status: ACTIVE | Noted: 2020-10-15

## 2021-07-14 PROBLEM — M89.9 BONE LESION: Status: ACTIVE | Noted: 2020-05-21

## 2021-07-14 PROBLEM — M19.90 ARTHRITIS: Status: ACTIVE | Noted: 2021-07-14

## 2021-07-14 PROBLEM — M54.30 SCIATICA: Status: ACTIVE | Noted: 2017-07-13

## 2021-07-14 PROBLEM — E78.5 HYPERLIPIDEMIA: Status: ACTIVE | Noted: 2020-10-15

## 2021-07-14 PROBLEM — R20.2 PARESTHESIA: Status: ACTIVE | Noted: 2020-05-21

## 2021-07-14 PROBLEM — M10.9 GOUT: Status: ACTIVE | Noted: 2020-10-15

## 2021-07-14 PROBLEM — Z85.46 HISTORY OF PROSTATE CANCER: Status: ACTIVE | Noted: 2020-05-21

## 2021-07-14 PROBLEM — K57.92 DIVERTICULITIS: Status: ACTIVE | Noted: 2021-07-14

## 2021-07-14 PROBLEM — K64.9 HEMORRHOIDS: Status: ACTIVE | Noted: 2021-07-14

## 2021-07-14 PROBLEM — M48.061 LUMBAR SPINAL STENOSIS: Status: ACTIVE | Noted: 2020-05-21

## 2021-07-14 PROBLEM — H91.90 HEARING LOSS: Status: ACTIVE | Noted: 2021-07-14

## 2021-07-14 PROBLEM — N28.9 RENAL INSUFFICIENCY: Status: ACTIVE | Noted: 2020-10-15

## 2021-07-14 PROBLEM — M47.816 LUMBAR SPONDYLOSIS: Status: ACTIVE | Noted: 2020-04-29

## 2021-07-14 PROBLEM — H93.19 TINNITUS: Status: ACTIVE | Noted: 2021-07-14

## 2021-07-14 PROBLEM — M54.16 LUMBAR RADICULOPATHY: Status: ACTIVE | Noted: 2021-07-14

## 2021-07-15 ENCOUNTER — OFFICE VISIT (OUTPATIENT)
Dept: FAMILY MEDICINE CLINIC | Facility: CLINIC | Age: 66
End: 2021-07-15

## 2021-07-15 VITALS
HEIGHT: 71 IN | BODY MASS INDEX: 29.01 KG/M2 | DIASTOLIC BLOOD PRESSURE: 72 MMHG | SYSTOLIC BLOOD PRESSURE: 142 MMHG | OXYGEN SATURATION: 91 % | WEIGHT: 207.2 LBS | HEART RATE: 80 BPM

## 2021-07-15 VITALS — BODY MASS INDEX: 29.4 KG/M2 | WEIGHT: 210 LBS | TEMPERATURE: 97.9 F | HEIGHT: 71 IN

## 2021-07-15 DIAGNOSIS — M48.061 SPINAL STENOSIS OF LUMBAR REGION, UNSPECIFIED WHETHER NEUROGENIC CLAUDICATION PRESENT: ICD-10-CM

## 2021-07-15 DIAGNOSIS — N28.9 RENAL INSUFFICIENCY: ICD-10-CM

## 2021-07-15 DIAGNOSIS — M10.9 GOUT, UNSPECIFIED CAUSE, UNSPECIFIED CHRONICITY, UNSPECIFIED SITE: ICD-10-CM

## 2021-07-15 DIAGNOSIS — E78.00 PURE HYPERCHOLESTEROLEMIA: Primary | ICD-10-CM

## 2021-07-15 DIAGNOSIS — N52.9 ERECTILE DYSFUNCTION, UNSPECIFIED ERECTILE DYSFUNCTION TYPE: ICD-10-CM

## 2021-07-15 DIAGNOSIS — I10 BENIGN ESSENTIAL HYPERTENSION: ICD-10-CM

## 2021-07-15 PROCEDURE — 99214 OFFICE O/P EST MOD 30 MIN: CPT | Performed by: PHYSICIAN ASSISTANT

## 2021-07-15 RX ORDER — ATORVASTATIN CALCIUM 20 MG/1
20 TABLET, FILM COATED ORAL NIGHTLY
Qty: 90 TABLET | Refills: 3 | Status: SHIPPED | OUTPATIENT
Start: 2021-07-15 | End: 2022-07-28 | Stop reason: SDUPTHER

## 2021-07-15 RX ORDER — ALLOPURINOL 300 MG/1
300 TABLET ORAL DAILY
Qty: 90 TABLET | Refills: 3 | Status: SHIPPED | OUTPATIENT
Start: 2021-07-15 | End: 2022-07-28 | Stop reason: SDUPTHER

## 2021-07-15 RX ORDER — MOMETASONE FUROATE 50 UG/1
SPRAY, METERED NASAL
COMMUNITY
Start: 2021-07-07 | End: 2022-07-28

## 2021-07-15 RX ORDER — LIDOCAINE 50 MG/G
1 PATCH TOPICAL EVERY 24 HOURS
Qty: 30 EACH | Refills: 10 | Status: SHIPPED | OUTPATIENT
Start: 2021-07-15

## 2021-07-15 RX ORDER — AMLODIPINE BESYLATE AND BENAZEPRIL HYDROCHLORIDE 5; 20 MG/1; MG/1
1 CAPSULE ORAL DAILY
Qty: 90 CAPSULE | Refills: 3 | Status: SHIPPED | OUTPATIENT
Start: 2021-07-15 | End: 2022-07-28 | Stop reason: SDUPTHER

## 2021-07-15 RX ORDER — SILDENAFIL 100 MG/1
100 TABLET, FILM COATED ORAL DAILY PRN
Qty: 30 TABLET | Refills: 3 | Status: SHIPPED | OUTPATIENT
Start: 2021-07-15 | End: 2022-07-28 | Stop reason: SDUPTHER

## 2021-07-15 RX ORDER — METOPROLOL SUCCINATE 100 MG/1
100 TABLET, EXTENDED RELEASE ORAL DAILY
Qty: 90 TABLET | Refills: 3 | Status: SHIPPED | OUTPATIENT
Start: 2021-07-15 | End: 2022-07-28 | Stop reason: SDUPTHER

## 2021-07-15 NOTE — PROGRESS NOTES
"Chief Complaint  Hypertension    Subjective          Dion Espana presents to [facility]  Patient is here for a 6 month follow up. He needs refills on all his medications. He has no concerns to address today.     Dr. singleton - Nephrology 1/2021 - released pt from his care.    Dr. Rosas - ENT    Recent visit to [facility] for inner ear.  Doing better.    HTN - well controlled with lotrel 5/20  Hyperlipidemia - atorvastatin 20mg QHS well controlled    Objective   Vital Signs:   /72   Pulse 80   Ht 180.3 cm (71\")   Wt 94 kg (207 lb 3.2 oz)   SpO2 91%   BMI 28.90 kg/m²     Physical Exam  Vitals and nursing note reviewed.   Constitutional:       Appearance: Normal appearance.   HENT:      Head: Normocephalic and atraumatic.   Cardiovascular:      Rate and Rhythm: Normal rate and regular rhythm.   Pulmonary:      Effort: Pulmonary effort is normal.      Breath sounds: Normal breath sounds.   Musculoskeletal:      Cervical back: Neck supple.   Neurological:      Mental Status: He is alert.   Psychiatric:         Mood and Affect: Mood normal.         Behavior: Behavior normal.        Result Review :     Common labs    Common Labsle 10/26/20 10/26/20 1/11/21 1/28/21 1/28/21 1/28/21    0845 0845  1105 1105 1105   Glucose  96    121 (A)   BUN  15    22   Creatinine  0.98    1.09   Sodium  142    140   Potassium  3.7    3.7   Chloride  105    104   Calcium  9.2    9.1   Albumin  4.0    4.1   Total Bilirubin  0.36    0.61   Alkaline Phosphatase  100    127   AST (SGOT)  16    15   ALT (SGPT)  16    14   WBC 5.29   7.11     Hemoglobin 12.8 (A)   13.4 (A)     Hematocrit 39.9 (A)   41.7 (A)     Platelets 265   309     PSA   0.07      Uric Acid     4.4    (A) Abnormal value                      Assessment and Plan    Diagnoses and all orders for this visit:    1. Pure hypercholesterolemia (Primary)  -     atorvastatin (LIPITOR) 20 MG tablet; Take 1 tablet by mouth Every Night.  " Dispense: 90 tablet; Refill: 3    2. Gout, unspecified cause, unspecified chronicity, unspecified site  -     allopurinol (ZYLOPRIM) 300 MG tablet; Take 1 tablet by mouth Daily.  Dispense: 90 tablet; Refill: 3    3. Benign essential hypertension  -     amLODIPine-benazepril (LOTREL 5-20) 5-20 MG per capsule; Take 1 capsule by mouth Daily.  Dispense: 90 capsule; Refill: 3  -     metoprolol succinate XL (TOPROL-XL) 100 MG 24 hr tablet; Take 1 tablet by mouth Daily.  Dispense: 90 tablet; Refill: 3    4. Renal insufficiency    5. Spinal stenosis of lumbar region, unspecified whether neurogenic claudication present  -     lidocaine (LIDODERM) 5 %; Place 1 patch on the skin as directed by provider Daily. Remove & Discard patch within 12 hours or as directed by MD  Dispense: 30 each; Refill: 10    6. Erectile dysfunction, unspecified erectile dysfunction type  -     sildenafil (Viagra) 100 MG tablet; Take 1 tablet by mouth Daily As Needed for Erectile Dysfunction.  Dispense: 30 tablet; Refill: 3        Follow Up   Return in about 6 months (around 1/15/2022).  Patient was given instructions and counseling regarding his condition or for health maintenance advice. Please see specific information pulled into the AVS if appropriate.

## 2021-09-09 ENCOUNTER — LAB (OUTPATIENT)
Dept: LAB | Facility: HOSPITAL | Age: 66
End: 2021-09-09

## 2021-09-09 ENCOUNTER — TRANSCRIBE ORDERS (OUTPATIENT)
Dept: LAB | Facility: HOSPITAL | Age: 66
End: 2021-09-09

## 2021-09-09 DIAGNOSIS — C61 MALIGNANT NEOPLASM OF PROSTATE (HCC): Primary | ICD-10-CM

## 2021-09-09 DIAGNOSIS — C61 MALIGNANT NEOPLASM OF PROSTATE (HCC): ICD-10-CM

## 2021-09-09 LAB — PSA SERPL-MCNC: 0.09 NG/ML (ref 0–4)

## 2021-09-09 PROCEDURE — 36415 COLL VENOUS BLD VENIPUNCTURE: CPT

## 2021-09-09 PROCEDURE — 84153 ASSAY OF PSA TOTAL: CPT

## 2021-09-15 ENCOUNTER — OFFICE VISIT (OUTPATIENT)
Dept: UROLOGY | Facility: CLINIC | Age: 66
End: 2021-09-15

## 2021-09-15 VITALS
BODY MASS INDEX: 29.15 KG/M2 | HEIGHT: 71 IN | DIASTOLIC BLOOD PRESSURE: 69 MMHG | WEIGHT: 208.2 LBS | SYSTOLIC BLOOD PRESSURE: 153 MMHG

## 2021-09-15 DIAGNOSIS — Z85.46 HISTORY OF PROSTATE CANCER: Primary | ICD-10-CM

## 2021-09-15 LAB
BILIRUB BLD-MCNC: NEGATIVE MG/DL
CLARITY, POC: CLEAR
COLOR UR: YELLOW
GLUCOSE UR STRIP-MCNC: NEGATIVE MG/DL
KETONES UR QL: ABNORMAL
LEUKOCYTE EST, POC: NEGATIVE
NITRITE UR-MCNC: NEGATIVE MG/ML
PH UR: 5.5 [PH] (ref 5–8)
PROT UR STRIP-MCNC: NEGATIVE MG/DL
RBC # UR STRIP: ABNORMAL /UL
SP GR UR: 1.02 (ref 1–1.03)
UROBILINOGEN UR QL: NORMAL

## 2021-09-15 PROCEDURE — 81003 URINALYSIS AUTO W/O SCOPE: CPT | Performed by: UROLOGY

## 2021-09-15 PROCEDURE — 99212 OFFICE O/P EST SF 10 MIN: CPT | Performed by: UROLOGY

## 2021-09-15 NOTE — PROGRESS NOTES
"Chief Complaint  Follow-up (6 Month Follow up PSA)    Subjective          Dion Espana presents to [facility]  67 yo male s/p RALP with bilateral PLND for intermediate risk prostate cancer.   Surgery in Sept 2016  PSA 4.7  Biopsy - 3+4 L mid and 3+3 R base  cT1c    Final Path - Surgery in Sept.   Marely 3+4 (Marely Group 2)  [id number]  margins were negative    Patient is pad free. He has tried Viagra for erections with about 50% erection with this. We discussed ICI today and he is interested in this. I will provide him with hand out for that and he will contact me should he want to have that.     3/26/18 - Patient is doing well. He is pad free. He denies urinary issues. The patient had PSA in March 2017 that was 0.02. He had this repeated 6 months ago and was 0.02. Prior to this in Dec 2016, PSA was undetectable. I have recommended checking the PSA today. The patient does not want to do ICI. He says his erections \"come and go\".    7/2/18 - Patient is doing well. He presents in follow up. He is doing well. He is pad free. His most recent PSA in March was <0.01. We reviewed this. I have recommended continuing to follow the PSA and checking that today. For erections, we discussed ICI. He does not want to do that at this point, but will consider that going forward.    1/7/19 - Patient presents in follow up. He is doing well. His last PSA in July was 0.04. His doubling time was 10 months. He has not had a PSA since. He states that he is doing well. The patient is pad free. He would like to try Sildenafil 100mg for ED. We discussed side effects including HA, facial flushing, slight drop in BP and erection that lasts over 4 hours.    7/30/19 - Patient presents in follow up. He is doing well. He has not had a PSA since I last saw him in Jan 2019. His PSA at that time was 0.04. His PSA 1 year ago was 0.04. I would like to have that drawn. He is pad free. He is using Sildenafil and has HAs " "and some vision changes with this. He describes his erections as \"coming and going\".    1/27/20:  Patient is here for routine follow up.  He is doing well.  His PSA is less than 0.01 recently.  He is having no issues with incontinence and is happy today with no new issues.    7/27/20:  Patient is here for routine follow up.  He is doing well with no new issues.  His most recent PSA has not been done.    9/15/21:  PSA most recently is 0.09.  This is stable.  He has no other new issues.       Objective   Vital Signs:   /69   Ht 180.3 cm (71\")   Wt 94.4 kg (208 lb 3.2 oz)   BMI 29.04 kg/m²     Physical Exam  Vitals and nursing note reviewed.   Constitutional:       Appearance: Normal appearance. He is well-developed.   Pulmonary:      Effort: Pulmonary effort is normal.      Breath sounds: Normal air entry.   Neurological:      Mental Status: He is alert and oriented to person, place, and time.      Motor: Motor function is intact.   Psychiatric:         Mood and Affect: Mood normal.         Behavior: Behavior normal.        Result Review :                  Results for orders placed or performed in visit on 09/15/21   POC Urinalysis Dipstick, Automated    Specimen: Urine   Result Value Ref Range    Color Yellow Yellow, Straw, Dark Yellow, Marce    Clarity, UA Clear Clear    Specific Gravity  1.020 1.005 - 1.030    pH, Urine 5.5 5.0 - 8.0    Leukocytes Negative Negative    Nitrite, UA Negative Negative    Protein, POC Negative Negative mg/dL    Glucose, UA Negative Negative, 1000 mg/dL (3+) mg/dL    Ketones, UA Trace (A) Negative    Urobilinogen, UA Normal Normal    Bilirubin Negative Negative    Blood, UA Trace (A) Negative       Assessment and Plan    Diagnoses and all orders for this visit:    1. History of prostate cancer (Primary)  Assessment & Plan:  Follow-up in 1 year with PSA prior.  His PSA remains undetectable.    Orders:  -     POC Urinalysis Dipstick, Automated  -     PSA DIAGNOSTIC; " Future      Follow Up   No follow-ups on file.  Patient was given instructions and counseling regarding his condition or for health maintenance advice. Please see specific information pulled into the AVS if appropriate.

## 2022-01-25 ENCOUNTER — TELEPHONE (OUTPATIENT)
Dept: FAMILY MEDICINE CLINIC | Facility: CLINIC | Age: 67
End: 2022-01-25

## 2022-01-25 ENCOUNTER — LAB (OUTPATIENT)
Dept: LAB | Facility: HOSPITAL | Age: 67
End: 2022-01-25

## 2022-01-25 ENCOUNTER — OFFICE VISIT (OUTPATIENT)
Dept: FAMILY MEDICINE CLINIC | Facility: CLINIC | Age: 67
End: 2022-01-25

## 2022-01-25 ENCOUNTER — HOSPITAL ENCOUNTER (OUTPATIENT)
Dept: GENERAL RADIOLOGY | Facility: HOSPITAL | Age: 67
Discharge: HOME | End: 2022-01-25

## 2022-01-25 VITALS
BODY MASS INDEX: 29.82 KG/M2 | HEIGHT: 71 IN | HEART RATE: 57 BPM | DIASTOLIC BLOOD PRESSURE: 87 MMHG | SYSTOLIC BLOOD PRESSURE: 128 MMHG | WEIGHT: 213 LBS | OXYGEN SATURATION: 97 %

## 2022-01-25 VITALS
HEART RATE: 57 BPM | BODY MASS INDEX: 29.82 KG/M2 | SYSTOLIC BLOOD PRESSURE: 128 MMHG | OXYGEN SATURATION: 97 % | HEIGHT: 71 IN | DIASTOLIC BLOOD PRESSURE: 87 MMHG | WEIGHT: 213 LBS

## 2022-01-25 DIAGNOSIS — Z12.5 SCREENING FOR PROSTATE CANCER: ICD-10-CM

## 2022-01-25 DIAGNOSIS — Z00.00 MEDICARE ANNUAL WELLNESS VISIT, SUBSEQUENT: Primary | ICD-10-CM

## 2022-01-25 DIAGNOSIS — I10 BENIGN ESSENTIAL HYPERTENSION: Primary | ICD-10-CM

## 2022-01-25 DIAGNOSIS — J32.9 CHRONIC SINUSITIS, UNSPECIFIED LOCATION: ICD-10-CM

## 2022-01-25 DIAGNOSIS — Z13.29 SCREENING FOR THYROID DISORDER: ICD-10-CM

## 2022-01-25 DIAGNOSIS — E78.00 PURE HYPERCHOLESTEROLEMIA: ICD-10-CM

## 2022-01-25 DIAGNOSIS — Z11.59 ENCOUNTER FOR HEPATITIS C SCREENING TEST FOR LOW RISK PATIENT: ICD-10-CM

## 2022-01-25 DIAGNOSIS — Z23 NEED FOR PNEUMOCOCCAL VACCINE: ICD-10-CM

## 2022-01-25 DIAGNOSIS — Z13.21 ENCOUNTER FOR VITAMIN DEFICIENCY SCREENING: ICD-10-CM

## 2022-01-25 DIAGNOSIS — M10.9 GOUT, UNSPECIFIED CAUSE, UNSPECIFIED CHRONICITY, UNSPECIFIED SITE: ICD-10-CM

## 2022-01-25 DIAGNOSIS — I10 BENIGN ESSENTIAL HYPERTENSION: ICD-10-CM

## 2022-01-25 LAB
25(OH)D3 SERPL-MCNC: 22 NG/ML (ref 30–100)
ALBUMIN SERPL-MCNC: 4.4 G/DL (ref 3.5–5.2)
ALBUMIN/GLOB SERPL: 1.5 G/DL
ALP SERPL-CCNC: 123 U/L (ref 39–117)
ALT SERPL W P-5'-P-CCNC: 17 U/L (ref 1–41)
ANION GAP SERPL CALCULATED.3IONS-SCNC: 10.6 MMOL/L (ref 5–15)
AST SERPL-CCNC: 19 U/L (ref 1–40)
BASOPHILS # BLD AUTO: 0.07 10*3/MM3 (ref 0–0.2)
BASOPHILS NFR BLD AUTO: 1 % (ref 0–1.5)
BILIRUB SERPL-MCNC: 1.3 MG/DL (ref 0–1.2)
BILIRUB UR QL STRIP: NEGATIVE
BUN SERPL-MCNC: 15 MG/DL (ref 8–23)
BUN/CREAT SERPL: 15.8 (ref 7–25)
CALCIUM SPEC-SCNC: 9.2 MG/DL (ref 8.6–10.5)
CHLORIDE SERPL-SCNC: 101 MMOL/L (ref 98–107)
CHOLEST SERPL-MCNC: 145 MG/DL (ref 0–200)
CLARITY UR: CLEAR
CO2 SERPL-SCNC: 28.4 MMOL/L (ref 22–29)
COLOR UR: YELLOW
CREAT SERPL-MCNC: 0.95 MG/DL (ref 0.76–1.27)
DEPRECATED RDW RBC AUTO: 42.5 FL (ref 37–54)
EOSINOPHIL # BLD AUTO: 0.16 10*3/MM3 (ref 0–0.4)
EOSINOPHIL NFR BLD AUTO: 2.4 % (ref 0.3–6.2)
ERYTHROCYTE [DISTWIDTH] IN BLOOD BY AUTOMATED COUNT: 13.2 % (ref 12.3–15.4)
GFR SERPL CREATININE-BSD FRML MDRD: 80 ML/MIN/1.73
GLOBULIN UR ELPH-MCNC: 3 GM/DL
GLUCOSE SERPL-MCNC: 91 MG/DL (ref 65–99)
GLUCOSE UR STRIP-MCNC: NEGATIVE MG/DL
HCT VFR BLD AUTO: 44 % (ref 37.5–51)
HCV AB SER DONR QL: NORMAL
HDLC SERPL-MCNC: 33 MG/DL (ref 40–60)
HGB BLD-MCNC: 15 G/DL (ref 13–17.7)
HGB UR QL STRIP.AUTO: NEGATIVE
IMM GRANULOCYTES # BLD AUTO: 0.04 10*3/MM3 (ref 0–0.05)
IMM GRANULOCYTES NFR BLD AUTO: 0.6 % (ref 0–0.5)
KETONES UR QL STRIP: NEGATIVE
LDLC SERPL CALC-MCNC: 78 MG/DL (ref 0–100)
LDLC/HDLC SERPL: 2.19 {RATIO}
LEUKOCYTE ESTERASE UR QL STRIP.AUTO: NEGATIVE
LYMPHOCYTES # BLD AUTO: 1.78 10*3/MM3 (ref 0.7–3.1)
LYMPHOCYTES NFR BLD AUTO: 26.7 % (ref 19.6–45.3)
MCH RBC QN AUTO: 30.1 PG (ref 26.6–33)
MCHC RBC AUTO-ENTMCNC: 34.1 G/DL (ref 31.5–35.7)
MCV RBC AUTO: 88.4 FL (ref 79–97)
MONOCYTES # BLD AUTO: 0.88 10*3/MM3 (ref 0.1–0.9)
MONOCYTES NFR BLD AUTO: 13.2 % (ref 5–12)
NEUTROPHILS NFR BLD AUTO: 3.74 10*3/MM3 (ref 1.7–7)
NEUTROPHILS NFR BLD AUTO: 56.1 % (ref 42.7–76)
NITRITE UR QL STRIP: NEGATIVE
NRBC BLD AUTO-RTO: 0 /100 WBC (ref 0–0.2)
PH UR STRIP.AUTO: 5.5 [PH] (ref 5–8)
PLATELET # BLD AUTO: 259 10*3/MM3 (ref 140–450)
PMV BLD AUTO: 9.4 FL (ref 6–12)
POTASSIUM SERPL-SCNC: 3.8 MMOL/L (ref 3.5–5.2)
PROT SERPL-MCNC: 7.4 G/DL (ref 6–8.5)
PROT UR QL STRIP: ABNORMAL
RBC # BLD AUTO: 4.98 10*6/MM3 (ref 4.14–5.8)
SODIUM SERPL-SCNC: 140 MMOL/L (ref 136–145)
SP GR UR STRIP: 1.03 (ref 1–1.03)
TRIGL SERPL-MCNC: 198 MG/DL (ref 0–150)
TSH SERPL DL<=0.05 MIU/L-ACNC: 1.99 UIU/ML (ref 0.27–4.2)
UROBILINOGEN UR QL STRIP: ABNORMAL
VLDLC SERPL-MCNC: 34 MG/DL (ref 5–40)
WBC NRBC COR # BLD: 6.67 10*3/MM3 (ref 3.4–10.8)

## 2022-01-25 PROCEDURE — 80050 GENERAL HEALTH PANEL: CPT

## 2022-01-25 PROCEDURE — 70220 X-RAY EXAM OF SINUSES: CPT

## 2022-01-25 PROCEDURE — 82306 VITAMIN D 25 HYDROXY: CPT

## 2022-01-25 PROCEDURE — G0438 PPPS, INITIAL VISIT: HCPCS | Performed by: PHYSICIAN ASSISTANT

## 2022-01-25 PROCEDURE — 81003 URINALYSIS AUTO W/O SCOPE: CPT

## 2022-01-25 PROCEDURE — 80061 LIPID PANEL: CPT

## 2022-01-25 PROCEDURE — 36415 COLL VENOUS BLD VENIPUNCTURE: CPT

## 2022-01-25 PROCEDURE — 86803 HEPATITIS C AB TEST: CPT

## 2022-01-25 PROCEDURE — 96372 THER/PROPH/DIAG INJ SC/IM: CPT | Performed by: PHYSICIAN ASSISTANT

## 2022-01-25 PROCEDURE — 90471 IMMUNIZATION ADMIN: CPT | Performed by: PHYSICIAN ASSISTANT

## 2022-01-25 PROCEDURE — 90670 PCV13 VACCINE IM: CPT | Performed by: PHYSICIAN ASSISTANT

## 2022-01-25 PROCEDURE — 99214 OFFICE O/P EST MOD 30 MIN: CPT | Performed by: PHYSICIAN ASSISTANT

## 2022-01-25 RX ORDER — TRIAMCINOLONE ACETONIDE 40 MG/ML
40 INJECTION, SUSPENSION INTRA-ARTICULAR; INTRAMUSCULAR ONCE
Status: COMPLETED | OUTPATIENT
Start: 2022-01-25 | End: 2022-01-25

## 2022-01-25 RX ORDER — MONTELUKAST SODIUM 10 MG/1
10 TABLET ORAL NIGHTLY
Qty: 90 TABLET | Refills: 1 | Status: SHIPPED | OUTPATIENT
Start: 2022-01-25 | End: 2022-07-28 | Stop reason: SDUPTHER

## 2022-01-25 RX ADMIN — TRIAMCINOLONE ACETONIDE 40 MG: 40 INJECTION, SUSPENSION INTRA-ARTICULAR; INTRAMUSCULAR at 09:13

## 2022-01-25 NOTE — PROGRESS NOTES
Chief Complaint  Hyperlipidemia (atorvastatin 20mg, 6 month follow up), Hypertension (Lotrel 5-20mg, metoprolol succ 100mg), and Gout (allopurinol 300mg)    Subjective          Dion Espana presents to [facility]  History of Present Illness  Pt presents today for 6 month follow up.    Pt states he is having sinus issues, unable to shake. Pt states he has had for awhile. Pt c/o sinus drainage, congestion and dull headache.  Denies any fever, no issues with taste or smell, soa or cp.    Pt states he has sleeping issues. Pt states he is unable to stay asleep. Pt is currently taking melatonin and only gets about 4 hrs a night.    Pt would like to discuss getting pneumonia vaccine.    Labs 8/12/20  LDL 42  AWV due today    No CP, SOA, HA    Past Medical History:   Diagnosis Date   • Arthritis    • Bone lesion 05/21/2020   • Diverticulitis    • Essential hypertension 10/15/2020   • Gout 10/15/2020   • Hearing loss    • Hemorrhoids    • Hyperlipidemia 10/15/2020   • Lumbago 07/13/2017    LOW BACK PAIN   • Lumbar disc herniation 07/13/2017    LEFT; L3-4   • Lumbar spinal stenosis 05/21/2020   • Paresthesia 05/21/2020   • Prostate cancer (HCC) 05/21/2020   • Radiculopathy, lumbosacral region 05/21/2020   • Renal insufficiency 10/15/2020   • Sciatica 07/13/2017   • Tinnitus       Family History   Problem Relation Age of Onset   • Arthritis Mother    • Stroke Mother    • Heart disease Mother    • Diabetes Mother    • Heart disease Father    • Stroke Brother       Past Surgical History:   Procedure Laterality Date   • BACK SURGERY      X2   • CARPAL TUNNEL RELEASE     • CHOLECYSTECTOMY     • COLONOSCOPY  2006    DR HADLEY   • HAND SURGERY      THUMB SURGERY   • KNEE ARTHROSCOPY Bilateral    • LUMBAR DISCECTOMY  07/19/2017    L3-4; DR UGARTE; MINIMALLY INVASIVE   • OTHER SURGICAL HISTORY      FISTULA   • PROSTATE BIOPSY     • PROSTATECTOMY          Current Outpatient Medications:   •   "allopurinol (ZYLOPRIM) 300 MG tablet, Take 1 tablet by mouth Daily., Disp: 90 tablet, Rfl: 3  •  amLODIPine-benazepril (LOTREL 5-20) 5-20 MG per capsule, Take 1 capsule by mouth Daily., Disp: 90 capsule, Rfl: 3  •  atorvastatin (LIPITOR) 20 MG tablet, Take 1 tablet by mouth Every Night., Disp: 90 tablet, Rfl: 3  •  fluticasone (FLONASE) 50 MCG/ACT nasal spray, 2 sprays into the nostril(s) as directed by provider Daily for 30 days., Disp: 9.9 mL, Rfl: 0  •  Krill Oil (Omega-3) 500 MG capsule, krill oil 500 mg oral capsule take 1 capsule by oral route daily   Active, Disp: , Rfl:   •  lidocaine (LIDODERM) 5 %, Place 1 patch on the skin as directed by provider Daily. Remove & Discard patch within 12 hours or as directed by MD, Disp: 30 each, Rfl: 10  •  metoprolol succinate XL (TOPROL-XL) 100 MG 24 hr tablet, Take 1 tablet by mouth Daily., Disp: 90 tablet, Rfl: 3  •  mometasone (NASONEX) 50 MCG/ACT nasal spray, , Disp: , Rfl:   •  montelukast (Singulair) 10 MG tablet, Take 1 tablet by mouth Every Night., Disp: 90 tablet, Rfl: 1  •  sildenafil (Viagra) 100 MG tablet, Take 1 tablet by mouth Daily As Needed for Erectile Dysfunction., Disp: 30 tablet, Rfl: 3    Current Facility-Administered Medications:   •  pneumococcal conj. 13-valent (PREVNAR-13) vaccine 0.5 mL, 0.5 mL, Intramuscular, Once, Da Self PA  •  triamcinolone acetonide (KENALOG-40) injection 40 mg, 40 mg, Intramuscular, Once, Da Self PA    Objective     Vital Signs:     /87 (BP Location: Left arm)   Pulse 57   Ht 180.3 cm (71\")   Wt 96.6 kg (213 lb)   SpO2 97%   BMI 29.71 kg/m²    Estimated body mass index is 29.71 kg/m² as calculated from the following:    Height as of this encounter: 180.3 cm (71\").    Weight as of this encounter: 96.6 kg (213 lb).     Wt Readings from Last 3 Encounters:   01/25/22 96.6 kg (213 lb)   01/25/22 96.6 kg (213 lb)   09/15/21 94.4 kg (208 lb 3.2 oz)     BP Readings from Last 3 Encounters:   01/25/22 128/87 "   01/25/22 128/87   09/15/21 153/69     Physical Exam  Vitals and nursing note reviewed.   Constitutional:       Appearance: Normal appearance.   HENT:      Head: Normocephalic and atraumatic.      Right Ear: Tympanic membrane and ear canal normal.      Left Ear: Tympanic membrane and ear canal normal.      Nose: Congestion present.      Mouth/Throat:      Mouth: Mucous membranes are moist.      Pharynx: Oropharynx is clear.   Eyes:      Conjunctiva/sclera: Conjunctivae normal.      Pupils: Pupils are equal, round, and reactive to light.   Cardiovascular:      Rate and Rhythm: Normal rate and regular rhythm.      Heart sounds: Normal heart sounds.   Pulmonary:      Effort: Pulmonary effort is normal.      Breath sounds: Normal breath sounds.   Musculoskeletal:      Cervical back: Neck supple.   Neurological:      Mental Status: He is alert.   Psychiatric:         Mood and Affect: Mood normal.         Behavior: Behavior normal.       Result Review :     Common labs    Common Labsle 9/9/21   PSA 0.090                         Assessment and Plan      Diagnoses and all orders for this visit:    1. Benign essential hypertension (Primary)  -     Lipid Panel; Future  -     CBC & Differential; Future  -     Comprehensive Metabolic Panel; Future    2. Pure hypercholesterolemia  Overview:  Controlled lipitor 20mg QHS     Orders:  -     Lipid Panel; Future  -     CBC & Differential; Future  -     Comprehensive Metabolic Panel; Future    3. Gout, unspecified cause, unspecified chronicity, unspecified site  Overview:  Controlled with allopurinol 300mg       Orders:  -     Urinalysis With Culture If Indicated -; Future    4. Screening for thyroid disorder  -     TSH; Future    5. Encounter for vitamin deficiency screening  -     Vitamin D 25 Hydroxy; Future    6. Need for pneumococcal vaccine  -     pneumococcal conj. 13-valent (PREVNAR-13) vaccine 0.5 mL    7. Screening for prostate cancer  -     Cancel: PSA SCREENING;  Future    8. Chronic sinusitis, unspecified location  -     XR Sinuses 3+ View; Future  -     montelukast (Singulair) 10 MG tablet; Take 1 tablet by mouth Every Night.  Dispense: 90 tablet; Refill: 1  -     triamcinolone acetonide (KENALOG-40) injection 40 mg    9. Encounter for hepatitis C screening test for low risk patient  -     Hepatitis C antibody; Future     Follow Up     Return in about 6 months (around 7/25/2022).    Patient was given instructions and counseling regarding his condition or for health maintenance advice. Please see specific information pulled into the AVS if appropriate.     I have reviewed information obtained and documented by others and I have confirmed the accuracy of this documented note.    RAFY Rojas

## 2022-01-25 NOTE — PROGRESS NOTES
The ABCs of the Annual Wellness Visit  [place] to Medicare Visit    Chief Complaint   Patient presents with   • Medicare Wellness-subsequent     AWV     Subjective {   History of Present Illness:  Dion Espana is a 66 y.o. male who presents for a  Welcome to Medicare Visit.    The following portions of the patient's history were reviewed and   updated as appropriate: allergies, past family history, past medical history, past social history, past surgical history and problem list.     Compared to one year ago, the patient feels his physical   health is the same.    Compared to one year ago, the patient feels his mental   health is the same.    Recent Hospitalizations:  He was not admitted to the hospital during the last year.       Current Medical Providers:  Patient Care Team:  Da Self PA as PCP - General (Physician Assistant)    Outpatient Medications Prior to Visit   Medication Sig Dispense Refill   • allopurinol (ZYLOPRIM) 300 MG tablet Take 1 tablet by mouth Daily. 90 tablet 3   • amLODIPine-benazepril (LOTREL 5-20) 5-20 MG per capsule Take 1 capsule by mouth Daily. 90 capsule 3   • atorvastatin (LIPITOR) 20 MG tablet Take 1 tablet by mouth Every Night. 90 tablet 3   • fluticasone (FLONASE) 50 MCG/ACT nasal spray 2 sprays into the nostril(s) as directed by provider Daily for 30 days. 9.9 mL 0   • Krill Oil (Omega-3) 500 MG capsule krill oil 500 mg oral capsule take 1 capsule by oral route daily   Active     • lidocaine (LIDODERM) 5 % Place 1 patch on the skin as directed by provider Daily. Remove & Discard patch within 12 hours or as directed by MD 30 each 10   • metoprolol succinate XL (TOPROL-XL) 100 MG 24 hr tablet Take 1 tablet by mouth Daily. 90 tablet 3   • mometasone (NASONEX) 50 MCG/ACT nasal spray      • sildenafil (Viagra) 100 MG tablet Take 1 tablet by mouth Daily As Needed for Erectile Dysfunction. 30 tablet 3     No facility-administered medications prior to visit.       No opioid  "medication identified on active medication list. I have reviewed chart for other potential  high risk medication/s and harmful drug interactions in the elderly.          Aspirin is not on active medication list.  Aspirin use is not indicated based on review of current medical condition/s. Risk of harm outweighs potential benefits.  .    Patient Active Problem List   Diagnosis   • Arthritis   • Benign essential hypertension   • Bone lesion   • Displacement of lumbar intervertebral disc without myelopathy   • Diverticulitis   • Gout   • Hearing loss   • Hemorrhoids   • History of prostate cancer   • Hyperlipidemia   • Lumbar radiculopathy   • Lumbar spinal stenosis   • Lumbar spondylosis   • Paresthesia   • Renal insufficiency   • Sciatica   • Sciatica   • Tinnitus     Advance Care Planning  Advance Directive is not on file.  ACP discussion was held with the patient during this visit. Patient has an advance directive (not in EMR), copy requested. Patient does not have an advance directive, information provided.          Objective      Vitals:    01/25/22 0840   BP: 128/87   BP Location: Left arm   Pulse: 57   SpO2: 97%   Weight: 96.6 kg (213 lb)   Height: 180.3 cm (70.98\")   PainSc:   4     BMI Readings from Last 1 Encounters:   01/25/22 29.72 kg/m²   BMI is above normal parameters. Recommendations include: educational material    Does the patient have evidence of cognitive impairment? No         Procedures       HEALTH RISK ASSESSMENT    Smoking Status:  Social History     Tobacco Use   Smoking Status Never Smoker   Smokeless Tobacco Never Used     Alcohol Consumption:  Social History     Substance and Sexual Activity   Alcohol Use Not Currently       Fall Risk Screen:    IVANA Fall Risk Assessment was completed, and patient is at LOW risk for falls.Assessment completed on:1/25/2022    Depression Screen:   PHQ-2/PHQ-9 Depression Screening 1/25/2022   Little interest or pleasure in doing things 0   Feeling down, " depressed, or hopeless 0   Total Score 0       Health Habits and Functional and Cognitive Screening:  Functional & Cognitive Status 1/25/2022   Do you have difficulty preparing food and eating? No   Do you have difficulty bathing yourself, getting dressed or grooming yourself? No   Do you have difficulty using the toilet? No   Do you have difficulty moving around from place to place? No   Do you have trouble with steps or getting out of a bed or a chair? No   Current Diet Well Balanced Diet   Dental Exam Up to date   Eye Exam Up to date   Exercise (times per week) 7 times per week   Current Exercises Include Walking        Exercise Comment farming   Do you need help using the phone?  No   Are you deaf or do you have serious difficulty hearing?  No   Do you need help with transportation? No   Do you need help shopping? No   Do you need help preparing meals?  No   Do you need help with housework?  No   Do you need help with laundry? No   Do you need help taking your medications? No   Do you need help managing money? No   Do you ever drive or ride in a car without wearing a seat belt? No   Have you felt unusual stress, anger or loneliness in the last month? No   Who do you live with? Spouse   If you need help, do you have trouble finding someone available to you? No   Do you have difficulty concentrating, remembering or making decisions? No       Visual Acuity:    No exam data present    Age-appropriate Screening Schedule:  Refer to the list below for future screening recommendations based on patient's age, sex and/or medical conditions. Orders for these recommended tests are listed in the plan section. The patient has been provided with a written plan.    Health Maintenance   Topic Date Due   • TDAP/TD VACCINES (1 - Tdap) Never done   • LIPID PANEL  08/12/2021   • INFLUENZA VACCINE  Completed   • ZOSTER VACCINE  Completed          Assessment/Plan   CMS Preventative Services Quick Reference  Risk Factors Identified  During Encounter  None Identified  The above risks/problems have been discussed with the patient.  Pertinent information has been shared with the patient in the After Visit Summary.  Follow up plans and orders are seen below in the Assessment/Plan Section.    Diagnoses and all orders for this visit:    1. Medicare annual wellness visit, subsequent (Primary)      Follow Up:   Return in about 6 months (around 7/25/2022).     An After Visit Summary and PPPS were made available to the patient.            I have reviewed information obtained and documented by others and I have confirmed the accuracy of this documented note.    RAFY Rojas

## 2022-01-26 DIAGNOSIS — E55.9 VITAMIN D DEFICIENCY: ICD-10-CM

## 2022-01-26 DIAGNOSIS — R74.8 ELEVATED ALKALINE PHOSPHATASE LEVEL: Primary | ICD-10-CM

## 2022-01-26 RX ORDER — ERGOCALCIFEROL 1.25 MG/1
50000 CAPSULE ORAL WEEKLY
Qty: 13 CAPSULE | Refills: 1 | Status: SHIPPED | OUTPATIENT
Start: 2022-01-26 | End: 2022-07-28

## 2022-01-27 ENCOUNTER — TELEPHONE (OUTPATIENT)
Dept: FAMILY MEDICINE CLINIC | Facility: CLINIC | Age: 67
End: 2022-01-27

## 2022-01-27 NOTE — TELEPHONE ENCOUNTER
----- Message from RAFY Rojas sent at 1/26/2022  7:12 PM EST -----  Vitamin D Deficiency noted - begin Vitamin D 50,000 IU weekly #[id number]    Low chol diet and exercise, avoid fried, fatty, greasy foods    Elevated Alk Phos and Bilirubin - RUQ (liver) US needed

## 2022-01-28 ENCOUNTER — TELEPHONE (OUTPATIENT)
Dept: FAMILY MEDICINE CLINIC | Facility: CLINIC | Age: 67
End: 2022-01-28

## 2022-03-03 ENCOUNTER — HOSPITAL ENCOUNTER (OUTPATIENT)
Dept: ULTRASOUND IMAGING | Facility: HOSPITAL | Age: 67
Discharge: HOME | End: 2022-03-03
Admitting: PHYSICIAN ASSISTANT

## 2022-03-03 ENCOUNTER — TELEPHONE (OUTPATIENT)
Dept: FAMILY MEDICINE CLINIC | Facility: CLINIC | Age: 67
End: 2022-03-03

## 2022-03-03 DIAGNOSIS — I87.1 HEPATIC VEIN STENOSIS: Primary | ICD-10-CM

## 2022-03-03 DIAGNOSIS — R74.8 ELEVATED ALKALINE PHOSPHATASE LEVEL: ICD-10-CM

## 2022-03-03 DIAGNOSIS — K76.0 FATTY LIVER: ICD-10-CM

## 2022-03-03 PROCEDURE — 76705 ECHO EXAM OF ABDOMEN: CPT

## 2022-03-03 NOTE — TELEPHONE ENCOUNTER
----- Message from RAFY Rojas sent at 3/3/2022  1:11 PM EST -----  Liver US - mild fatty liver - consult GI routinely

## 2022-04-05 ENCOUNTER — LAB (OUTPATIENT)
Dept: LAB | Facility: HOSPITAL | Age: 67
End: 2022-04-05

## 2022-04-05 ENCOUNTER — OFFICE VISIT (OUTPATIENT)
Dept: GASTROENTEROLOGY | Facility: CLINIC | Age: 67
End: 2022-04-05

## 2022-04-05 VITALS
HEART RATE: 55 BPM | BODY MASS INDEX: 29.76 KG/M2 | HEIGHT: 71 IN | DIASTOLIC BLOOD PRESSURE: 67 MMHG | SYSTOLIC BLOOD PRESSURE: 138 MMHG | WEIGHT: 212.6 LBS

## 2022-04-05 DIAGNOSIS — R79.89 ELEVATED LFTS: Primary | ICD-10-CM

## 2022-04-05 DIAGNOSIS — Z11.59 ENCOUNTER FOR SCREENING FOR OTHER VIRAL DISEASES: ICD-10-CM

## 2022-04-05 DIAGNOSIS — R93.2 ABNORMAL FINDINGS ON DIAGNOSTIC IMAGING OF LIVER AND BILIARY TRACT: ICD-10-CM

## 2022-04-05 DIAGNOSIS — K76.0 FATTY LIVER: ICD-10-CM

## 2022-04-05 LAB
ALBUMIN SERPL-MCNC: 4.4 G/DL (ref 3.5–5.2)
ALP SERPL-CCNC: 100 U/L (ref 39–117)
ALPHA-FETOPROTEIN: 3.38 NG/ML (ref 0–8.3)
ALT SERPL W P-5'-P-CCNC: 24 U/L (ref 1–41)
AST SERPL-CCNC: 23 U/L (ref 1–40)
BILIRUB CONJ SERPL-MCNC: 0.2 MG/DL (ref 0–0.3)
BILIRUB INDIRECT SERPL-MCNC: 0.6 MG/DL
BILIRUB SERPL-MCNC: 0.8 MG/DL (ref 0–1.2)
CERULOPLASMIN SERPL-MCNC: 21 MG/DL (ref 16–31)
FERRITIN SERPL-MCNC: 150 NG/ML (ref 30–400)
HBV SURFACE AB SER RIA-ACNC: NORMAL
INR PPP: 0.95 (ref 2–3)
IRON 24H UR-MRATE: 108 MCG/DL (ref 59–158)
IRON SATN MFR SERPL: 29 % (ref 20–50)
PROT SERPL-MCNC: 7.5 G/DL (ref 6–8.5)
PROTHROMBIN TIME: 10.1 SECONDS (ref 9.4–12)
TIBC SERPL-MCNC: 378 MCG/DL (ref 298–536)
TRANSFERRIN SERPL-MCNC: 254 MG/DL (ref 200–360)

## 2022-04-05 PROCEDURE — 82525 ASSAY OF COPPER: CPT

## 2022-04-05 PROCEDURE — 84155 ASSAY OF PROTEIN SERUM: CPT

## 2022-04-05 PROCEDURE — 84466 ASSAY OF TRANSFERRIN: CPT

## 2022-04-05 PROCEDURE — 87340 HEPATITIS B SURFACE AG IA: CPT | Performed by: NURSE PRACTITIONER

## 2022-04-05 PROCEDURE — 82728 ASSAY OF FERRITIN: CPT

## 2022-04-05 PROCEDURE — 86381 MITOCHONDRIAL ANTIBODY EACH: CPT

## 2022-04-05 PROCEDURE — 86334 IMMUNOFIX E-PHORESIS SERUM: CPT

## 2022-04-05 PROCEDURE — 86038 ANTINUCLEAR ANTIBODIES: CPT

## 2022-04-05 PROCEDURE — 82784 ASSAY IGA/IGD/IGG/IGM EACH: CPT

## 2022-04-05 PROCEDURE — 99214 OFFICE O/P EST MOD 30 MIN: CPT | Performed by: NURSE PRACTITIONER

## 2022-04-05 PROCEDURE — 85610 PROTHROMBIN TIME: CPT

## 2022-04-05 PROCEDURE — 83540 ASSAY OF IRON: CPT

## 2022-04-05 PROCEDURE — 82105 ALPHA-FETOPROTEIN SERUM: CPT

## 2022-04-05 PROCEDURE — 84165 PROTEIN E-PHORESIS SERUM: CPT

## 2022-04-05 PROCEDURE — 86706 HEP B SURFACE ANTIBODY: CPT | Performed by: NURSE PRACTITIONER

## 2022-04-05 PROCEDURE — 86708 HEPATITIS A ANTIBODY: CPT | Performed by: NURSE PRACTITIONER

## 2022-04-05 PROCEDURE — 80076 HEPATIC FUNCTION PANEL: CPT

## 2022-04-05 PROCEDURE — 86015 ACTIN ANTIBODY EACH: CPT

## 2022-04-05 PROCEDURE — 36415 COLL VENOUS BLD VENIPUNCTURE: CPT | Performed by: NURSE PRACTITIONER

## 2022-04-05 PROCEDURE — 86225 DNA ANTIBODY NATIVE: CPT

## 2022-04-05 PROCEDURE — 82390 ASSAY OF CERULOPLASMIN: CPT

## 2022-04-05 NOTE — PATIENT INSTRUCTIONS
Fatty Liver Disease    The liver converts food into energy, removes toxic material from the blood, makes important proteins, and absorbs necessary vitamins from food. Fatty liver disease occurs when too much fat has built up in your liver cells. Fatty liver disease is also called hepatic steatosis.  In many cases, fatty liver disease does not cause symptoms or problems. It is often diagnosed when tests are being done for other reasons. However, over time, fatty liver can cause inflammation that may lead to more serious liver problems, such as scarring of the liver (cirrhosis) and liver failure.  Fatty liver is associated with insulin resistance, increased body fat, high blood pressure (hypertension), and high cholesterol. These are features of metabolic syndrome and increase your risk for stroke, diabetes, and heart disease.  What are the causes?  This condition may be caused by components of metabolic syndrome:  Obesity.  Insulin resistance.  High cholesterol.  Other causes:  Alcohol abuse.  Poor nutrition.  Cushing syndrome.  Pregnancy.  Certain drugs.  Poisons.  Some viral infections.  What increases the risk?  You are more likely to develop this condition if you:  Abuse alcohol.  Are overweight.  Have diabetes.  Have hepatitis.  Have a high triglyceride level.  Are pregnant.  What are the signs or symptoms?  Fatty liver disease often does not cause symptoms. If symptoms do develop, they can include:  Fatigue and weakness.  Weight loss.  Confusion.  Nausea, vomiting, or abdominal pain.  Yellowing of your skin and the white parts of your eyes (jaundice).  Itchy skin.  How is this diagnosed?  This condition may be diagnosed by:  A physical exam and your medical history.  Blood tests.  Imaging tests, such as an ultrasound, CT scan, or MRI.  A liver biopsy. A small sample of liver tissue is removed using a needle. The sample is then looked at under a microscope.  How is this treated?  Fatty liver disease is often  caused by other health conditions. Treatment for fatty liver may involve medicines and lifestyle changes to manage conditions such as:  Alcoholism.  High cholesterol.  Diabetes.  Being overweight or obese.  Follow these instructions at home:    Do not drink alcohol. If you have trouble quitting, ask your health care provider how to safely quit with the help of medicine or a supervised program. This is important to keep your condition from getting worse.  Eat a healthy diet as told by your health care provider. Ask your health care provider about working with a dietitian to develop an eating plan.  Exercise regularly. This can help you lose weight and control your cholesterol and diabetes. Talk to your health care provider about an exercise plan and which activities are best for you.  Take over-the-counter and prescription medicines only as told by your health care provider.  Keep all follow-up visits. This is important.  Contact a health care provider if:  You have trouble controlling your:  Blood sugar. This is especially important if you have diabetes.  Cholesterol.  Drinking of alcohol.  Get help right away if:  You have abdominal pain.  You have jaundice.  You have nausea and are vomiting.  You vomit blood or material that looks like coffee grounds.  You have stools that are black, tar-like, or bloody.  Summary  Fatty liver disease develops when too much fat builds up in the cells of your liver.  Fatty liver disease often causes no symptoms or problems. However, over time, fatty liver can cause inflammation that may lead to more serious liver problems, such as scarring of the liver (cirrhosis).  You are more likely to develop this condition if you abuse alcohol, are pregnant, are overweight, have diabetes, have hepatitis, or have high triglyceride or cholesterol levels.  Contact your health care provider if you have trouble controlling your blood sugar, cholesterol, or drinking of alcohol.  This information is  not intended to replace advice given to you by your health care provider. Make sure you discuss any questions you have with your health care provider.  Document Revised: 09/30/2021 Document Reviewed: 09/30/2021  Elsevier Patient Education © 2021 [employer]

## 2022-04-05 NOTE — PROGRESS NOTES
Patient Name: Dion Espana   Visit Date: 04/05/2022   Patient ID: [id number]  Provider: NICOLAS Muhammad    Sex: male  Location:  Location Address:  Location Phone: [address]  [place] [address] [phone]    YOB: 1955  Age: 67 y.o.   Primary Care Provider Da Self PA      Referring Provider: RAFY Rojas        Chief Complaint  Fatty Liver (US Liver attached)    History of Present Illness  Patient referred for fatty liver, he has no GI complaints today.  No abdominal pain, no nausea vomiting or diarrhea, no blood in the stool.  He does not drink alcohol.  He is not a diabetic.  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  Noted elevated alk phos and bilirubin in January, platelets are normal. Hep C negative 1/25/22    Patient had a colonoscopy in 2021 by Dr. Barth-adenomatous polyp removed from the cecum, grade 1 internal hemorrhoids, erythema noted around IC valve, biopsy showed some active inflammation, Patient asymptomatic    Liver US Impression 3/3/22  1. Liver mildly increased in echogenicity      Past Medical History:   Diagnosis Date   • Arthritis    • Bone lesion 05/21/2020   • Diverticulitis    • Essential hypertension 10/15/2020   • Gout 10/15/2020   • Hearing loss    • Hemorrhoids    • Hyperlipidemia 10/15/2020   • Lumbago 07/13/2017    LOW BACK PAIN   • Lumbar disc herniation 07/13/2017    LEFT; L3-4   • Lumbar spinal stenosis 05/21/2020   • Paresthesia 05/21/2020   • Prostate cancer (HCC) 05/21/2020   • Radiculopathy, lumbosacral region 05/21/2020   • Renal insufficiency 10/15/2020   • Sciatica 07/13/2017   • Tinnitus        Past Surgical History:   Procedure Laterality Date   • BACK SURGERY      X2   • CARPAL TUNNEL RELEASE     • CHOLECYSTECTOMY     • COLONOSCOPY  2006    DR HADLEY   • HAND SURGERY      THUMB SURGERY   • KNEE ARTHROSCOPY Bilateral    • LUMBAR DISCECTOMY  07/19/2017    L3-4; DR UGARTE; MINIMALLY INVASIVE   •  "OTHER SURGICAL HISTORY      FISTULA   • PROSTATE BIOPSY     • PROSTATECTOMY         No Known Allergies    Family History   Problem Relation Age of Onset   • Arthritis Mother    • Stroke Mother    • Heart disease Mother    • Diabetes Mother    • Heart disease Father    • Stroke Brother    • Colon cancer Neg Hx         Social History     Tobacco Use   • Smoking status: Never Smoker   • Smokeless tobacco: Never Used   Vaping Use   • Vaping Use: Never used   Substance Use Topics   • Alcohol use: Not Currently   • Drug use: Never       Objective     Vital Signs:   /67 (BP Location: Left arm, Patient Position: Sitting, Cuff Size: Large Adult)   Pulse 55   Ht 180.3 cm (71\")   Wt 96.4 kg (212 lb 9.6 oz)   BMI 29.65 kg/m²       Physical Exam  Constitutional:       General: The patient is not in acute distress.     Appearance: Normal appearance.   HENT:      Head: Normocephalic and atraumatic.      Nose: Nose normal.   Pulmonary:      Effort: Pulmonary effort is normal. No respiratory distress.   Abdominal:      General: Abdomen is flat.      Palpations: Abdomen is soft. There is no mass.      Tenderness: There is no abdominal tenderness. There is no guarding.   Musculoskeletal:      Cervical back: Neck supple.      Right lower leg: No edema.      Left lower leg: No edema.   Skin:     General: Skin is warm and dry.   Neurological:      General: No focal deficit present.      Mental Status: The patient is alert and oriented to person, place, and time.      Gait: Gait normal.   Psychiatric:         Mood and Affect: Mood normal.         Speech: Speech normal.         Behavior: Behavior normal.         Thought Content: Thought content normal.     Result Review :   The following data was reviewed by: NICOLAS Muhammad on 04/05/2022:    CBC w/diff    CBC w/Diff 1/25/22   WBC 6.67   RBC 4.98   Hemoglobin 15.0   Hematocrit 44.0   MCV 88.4   MCH 30.1   MCHC 34.1   RDW 13.2   Platelets 259   Neutrophil Rel % 56.1 "   Immature Granulocyte Rel % 0.6 (A)   Lymphocyte Rel % 26.7   Monocyte Rel % 13.2 (A)   Eosinophil Rel % 2.4   Basophil Rel % 1.0   (A) Abnormal value            CMP    CMP 1/25/22   Glucose 91   BUN 15   Creatinine 0.95   eGFR Non African Am 80   Sodium 140   Potassium 3.8   Chloride 101   Calcium 9.2   Albumin 4.40   Total Bilirubin 1.3 (A)   Alkaline Phosphatase 123 (A)   AST (SGOT) 19   ALT (SGPT) 17   (A) Abnormal value                          Assessment and Plan    Diagnoses and all orders for this visit:    1. Elevated LFTs (Primary)  -     AFP Tumor Marker; Future  -     Alpha - 1 - Antitrypsin Phenotype; Future  -     MAURA; Future  -     Iron Profile; Future  -     Ferritin; Future  -     Copper, Serum; Future  -     Protime-INR; Future  -     Ceruloplasmin; Future  -     Mitochondrial Antibodies, M2; Future  -     Anti-Smooth Muscle Antibody Titer; Future  -     ARNULFO + PE; Future  -     Hepatic Function Panel; Future  -     Hepatitis A Antibody, Total  -     Hepatitis B Surface Antigen  -     Hepatitis B Surface Antibody    2. Fatty liver  -     AFP Tumor Marker; Future  -     Alpha - 1 - Antitrypsin Phenotype; Future  -     MAURA; Future  -     Iron Profile; Future  -     Ferritin; Future  -     Copper, Serum; Future  -     Protime-INR; Future  -     Ceruloplasmin; Future  -     Mitochondrial Antibodies, M2; Future  -     Anti-Smooth Muscle Antibody Titer; Future  -     ARNULFO + PE; Future  -     Hepatic Function Panel; Future  -     Hepatitis A Antibody, Total  -     Hepatitis B Surface Antigen  -     Hepatitis B Surface Antibody    3. Abnormal findings on diagnostic imaging of liver and biliary tract   -     AFP Tumor Marker; Future    4. Encounter for screening for other viral diseases   -     Hepatitis B Surface Antigen  -     Hepatitis B Surface Antibody            Follow Up   Return in about 3 months (around 7/5/2022).   Discussed with patient importance of weight loss with 10% weight loss goal, low-carb  diet reviewed, and the benefit of 2-4 c. Coffee/d discussed.  Written information given to patient today.  Check hepatic w/u    Patient was given instructions and counseling regarding his condition or for health maintenance advice. Please see specific information pulled into the AVS if appropriate.

## 2022-04-06 ENCOUNTER — LAB (OUTPATIENT)
Dept: LAB | Facility: HOSPITAL | Age: 67
End: 2022-04-06

## 2022-04-06 DIAGNOSIS — K76.0 FATTY LIVER: ICD-10-CM

## 2022-04-06 DIAGNOSIS — R79.89 ELEVATED LFTS: ICD-10-CM

## 2022-04-06 LAB — HBV SURFACE AG SERPL QL IA: NORMAL

## 2022-04-06 PROCEDURE — 82103 ALPHA-1-ANTITRYPSIN TOTAL: CPT

## 2022-04-06 PROCEDURE — 82104 ALPHA-1-ANTITRYPSIN PHENO: CPT

## 2022-04-07 LAB
ALBUMIN SERPL ELPH-MCNC: 3.7 G/DL (ref 2.9–4.4)
ALBUMIN/GLOB SERPL: 1.1 {RATIO} (ref 0.7–1.7)
ALPHA1 GLOB SERPL ELPH-MCNC: 0.3 G/DL (ref 0–0.4)
ALPHA2 GLOB SERPL ELPH-MCNC: 0.9 G/DL (ref 0.4–1)
B-GLOBULIN SERPL ELPH-MCNC: 1 G/DL (ref 0.7–1.3)
DSDNA IGG SERPL IA-ACNC: NEGATIVE [IU]/ML
GAMMA GLOB SERPL ELPH-MCNC: 1.2 G/DL (ref 0.4–1.8)
GLOBULIN SER-MCNC: 3.4 G/DL (ref 2.2–3.9)
HAV AB SER QL IA: POSITIVE
IGA SERPL-MCNC: 265 MG/DL (ref 61–437)
IGG SERPL-MCNC: 1069 MG/DL (ref 603–1613)
IGM SERPL-MCNC: 155 MG/DL (ref 20–172)
INTERPRETATION SERPL IEP-IMP: NORMAL
LABORATORY COMMENT REPORT: NORMAL
M PROTEIN SERPL ELPH-MCNC: NORMAL G/DL
MITOCHONDRIA M2 IGG SER-ACNC: <20 UNITS (ref 0–20)
NUCLEAR IGG SER IA-RTO: NEGATIVE
PROT SERPL-MCNC: 7.1 G/DL (ref 6–8.5)
SMA IGG SER-ACNC: 11 UNITS (ref 0–19)

## 2022-04-10 LAB — COPPER SERPL-MCNC: 94 UG/DL (ref 69–132)

## 2022-04-12 LAB
A1AT PHENOTYP SERPL IFE: NORMAL
A1AT SERPL-MCNC: 142 MG/DL (ref 101–187)

## 2022-04-14 ENCOUNTER — TELEPHONE (OUTPATIENT)
Dept: FAMILY MEDICINE CLINIC | Facility: CLINIC | Age: 67
End: 2022-04-14

## 2022-04-14 ENCOUNTER — TELEPHONE (OUTPATIENT)
Dept: GASTROENTEROLOGY | Facility: CLINIC | Age: 67
End: 2022-04-14

## 2022-04-14 NOTE — TELEPHONE ENCOUNTER
Caller: Dion Espana    Relationship to patient: Self    Best call back number: [phone]     Patient is needing: PATIENT CALLED TO LET  THAT DR.TIFFANY VILLAREAL TOLD HIM THAT HE USE TO HAVE HEPATITIS A. THEY FOUND IT ON HIS BLOOD WORK.  DOESN'T KNOW WHEN HE HAD IT. PLEASE CALL AND ADVISE.

## 2022-04-14 NOTE — TELEPHONE ENCOUNTER
----- Message from NICOLAS Muhammad sent at 4/12/2022  4:43 PM EDT -----  Please notify patient he is positive for hepatitis A antibody, this could be due to previous infection or immunization, please just check on patient's symptoms and ensure no abd pain, n/v or fever

## 2022-04-14 NOTE — TELEPHONE ENCOUNTER
I spoke with the patient and gave him the results of his labs. Patient will f/u in July as planned.

## 2022-04-15 NOTE — TELEPHONE ENCOUNTER
Called and spoke with pt, he stated he has had Hep A vaccine before, advised pt that is why he tested positive for AB.

## 2022-07-28 ENCOUNTER — OFFICE VISIT (OUTPATIENT)
Dept: FAMILY MEDICINE CLINIC | Facility: CLINIC | Age: 67
End: 2022-07-28

## 2022-07-28 ENCOUNTER — LAB (OUTPATIENT)
Dept: LAB | Facility: HOSPITAL | Age: 67
End: 2022-07-28

## 2022-07-28 VITALS
TEMPERATURE: 98.2 F | BODY MASS INDEX: 28.36 KG/M2 | HEIGHT: 71 IN | WEIGHT: 202.6 LBS | DIASTOLIC BLOOD PRESSURE: 69 MMHG | HEART RATE: 50 BPM | SYSTOLIC BLOOD PRESSURE: 115 MMHG | OXYGEN SATURATION: 98 %

## 2022-07-28 DIAGNOSIS — I10 BENIGN ESSENTIAL HYPERTENSION: Primary | ICD-10-CM

## 2022-07-28 DIAGNOSIS — I10 BENIGN ESSENTIAL HYPERTENSION: ICD-10-CM

## 2022-07-28 DIAGNOSIS — Z13.29 SCREENING FOR THYROID DISORDER: ICD-10-CM

## 2022-07-28 DIAGNOSIS — M10.9 GOUT, UNSPECIFIED CAUSE, UNSPECIFIED CHRONICITY, UNSPECIFIED SITE: ICD-10-CM

## 2022-07-28 DIAGNOSIS — E78.00 PURE HYPERCHOLESTEROLEMIA: ICD-10-CM

## 2022-07-28 DIAGNOSIS — J32.9 CHRONIC SINUSITIS, UNSPECIFIED LOCATION: ICD-10-CM

## 2022-07-28 DIAGNOSIS — T78.40XD ALLERGY, SUBSEQUENT ENCOUNTER: ICD-10-CM

## 2022-07-28 DIAGNOSIS — E55.9 VITAMIN D DEFICIENCY: ICD-10-CM

## 2022-07-28 DIAGNOSIS — N52.9 ERECTILE DYSFUNCTION, UNSPECIFIED ERECTILE DYSFUNCTION TYPE: ICD-10-CM

## 2022-07-28 LAB
25(OH)D3 SERPL-MCNC: 52.2 NG/ML (ref 30–100)
ALBUMIN SERPL-MCNC: 4.3 G/DL (ref 3.5–5.2)
ALBUMIN/GLOB SERPL: 1.8 G/DL
ALP SERPL-CCNC: 99 U/L (ref 39–117)
ALT SERPL W P-5'-P-CCNC: 12 U/L (ref 1–41)
ANION GAP SERPL CALCULATED.3IONS-SCNC: 10.1 MMOL/L (ref 5–15)
AST SERPL-CCNC: 11 U/L (ref 1–40)
BASOPHILS # BLD AUTO: 0.05 10*3/MM3 (ref 0–0.2)
BASOPHILS NFR BLD AUTO: 0.7 % (ref 0–1.5)
BILIRUB SERPL-MCNC: 0.7 MG/DL (ref 0–1.2)
BUN SERPL-MCNC: 14 MG/DL (ref 8–23)
BUN/CREAT SERPL: 13.3 (ref 7–25)
CALCIUM SPEC-SCNC: 9.2 MG/DL (ref 8.6–10.5)
CHLORIDE SERPL-SCNC: 103 MMOL/L (ref 98–107)
CHOLEST SERPL-MCNC: 131 MG/DL (ref 0–200)
CO2 SERPL-SCNC: 26.9 MMOL/L (ref 22–29)
CREAT SERPL-MCNC: 1.05 MG/DL (ref 0.76–1.27)
DEPRECATED RDW RBC AUTO: 41.9 FL (ref 37–54)
EGFRCR SERPLBLD CKD-EPI 2021: 78.3 ML/MIN/1.73
EOSINOPHIL # BLD AUTO: 0.1 10*3/MM3 (ref 0–0.4)
EOSINOPHIL NFR BLD AUTO: 1.4 % (ref 0.3–6.2)
ERYTHROCYTE [DISTWIDTH] IN BLOOD BY AUTOMATED COUNT: 13.4 % (ref 12.3–15.4)
GLOBULIN UR ELPH-MCNC: 2.4 GM/DL
GLUCOSE SERPL-MCNC: 90 MG/DL (ref 65–99)
HCT VFR BLD AUTO: 42.5 % (ref 37.5–51)
HDLC SERPL-MCNC: 37 MG/DL (ref 40–60)
HGB BLD-MCNC: 14.4 G/DL (ref 13–17.7)
IMM GRANULOCYTES # BLD AUTO: 0.09 10*3/MM3 (ref 0–0.05)
IMM GRANULOCYTES NFR BLD AUTO: 1.3 % (ref 0–0.5)
LDLC SERPL CALC-MCNC: 62 MG/DL (ref 0–100)
LDLC/HDLC SERPL: 1.5 {RATIO}
LYMPHOCYTES # BLD AUTO: 1.83 10*3/MM3 (ref 0.7–3.1)
LYMPHOCYTES NFR BLD AUTO: 26.4 % (ref 19.6–45.3)
MCH RBC QN AUTO: 29.8 PG (ref 26.6–33)
MCHC RBC AUTO-ENTMCNC: 33.9 G/DL (ref 31.5–35.7)
MCV RBC AUTO: 88 FL (ref 79–97)
MONOCYTES # BLD AUTO: 0.82 10*3/MM3 (ref 0.1–0.9)
MONOCYTES NFR BLD AUTO: 11.8 % (ref 5–12)
NEUTROPHILS NFR BLD AUTO: 4.05 10*3/MM3 (ref 1.7–7)
NEUTROPHILS NFR BLD AUTO: 58.4 % (ref 42.7–76)
NRBC BLD AUTO-RTO: 0 /100 WBC (ref 0–0.2)
PLATELET # BLD AUTO: 262 10*3/MM3 (ref 140–450)
PMV BLD AUTO: 9.3 FL (ref 6–12)
POTASSIUM SERPL-SCNC: 4.6 MMOL/L (ref 3.5–5.2)
PROT SERPL-MCNC: 6.7 G/DL (ref 6–8.5)
RBC # BLD AUTO: 4.83 10*6/MM3 (ref 4.14–5.8)
SODIUM SERPL-SCNC: 140 MMOL/L (ref 136–145)
TRIGL SERPL-MCNC: 192 MG/DL (ref 0–150)
TSH SERPL DL<=0.05 MIU/L-ACNC: 2.34 UIU/ML (ref 0.27–4.2)
VLDLC SERPL-MCNC: 32 MG/DL (ref 5–40)
WBC NRBC COR # BLD: 6.94 10*3/MM3 (ref 3.4–10.8)

## 2022-07-28 PROCEDURE — 80050 GENERAL HEALTH PANEL: CPT

## 2022-07-28 PROCEDURE — 99214 OFFICE O/P EST MOD 30 MIN: CPT | Performed by: NURSE PRACTITIONER

## 2022-07-28 PROCEDURE — 36415 COLL VENOUS BLD VENIPUNCTURE: CPT

## 2022-07-28 PROCEDURE — 80061 LIPID PANEL: CPT

## 2022-07-28 PROCEDURE — 82306 VITAMIN D 25 HYDROXY: CPT

## 2022-07-28 RX ORDER — ALLOPURINOL 300 MG/1
300 TABLET ORAL DAILY
Qty: 90 TABLET | Refills: 1 | Status: SHIPPED | OUTPATIENT
Start: 2022-07-28 | End: 2023-01-12 | Stop reason: SDUPTHER

## 2022-07-28 RX ORDER — AMLODIPINE BESYLATE AND BENAZEPRIL HYDROCHLORIDE 5; 20 MG/1; MG/1
1 CAPSULE ORAL DAILY
Qty: 90 CAPSULE | Refills: 1 | Status: SHIPPED | OUTPATIENT
Start: 2022-07-28 | End: 2023-01-12 | Stop reason: SDUPTHER

## 2022-07-28 RX ORDER — SILDENAFIL 100 MG/1
100 TABLET, FILM COATED ORAL DAILY PRN
Qty: 30 TABLET | Refills: 3 | Status: SHIPPED | OUTPATIENT
Start: 2022-07-28 | End: 2023-01-12 | Stop reason: SDUPTHER

## 2022-07-28 RX ORDER — MONTELUKAST SODIUM 10 MG/1
10 TABLET ORAL NIGHTLY
Qty: 90 TABLET | Refills: 1 | Status: SHIPPED | OUTPATIENT
Start: 2022-07-28 | End: 2023-01-12 | Stop reason: SDUPTHER

## 2022-07-28 RX ORDER — METOPROLOL SUCCINATE 100 MG/1
100 TABLET, EXTENDED RELEASE ORAL DAILY
Qty: 90 TABLET | Refills: 1 | Status: SHIPPED | OUTPATIENT
Start: 2022-07-28 | End: 2023-01-12 | Stop reason: SDUPTHER

## 2022-07-28 RX ORDER — ATORVASTATIN CALCIUM 20 MG/1
20 TABLET, FILM COATED ORAL NIGHTLY
Qty: 90 TABLET | Refills: 1 | Status: SHIPPED | OUTPATIENT
Start: 2022-07-28 | End: 2023-01-12 | Stop reason: SDUPTHER

## 2022-07-28 NOTE — PROGRESS NOTES
Chief Complaint  Follow-up (6 months), Hypertension, Hyperlipidemia, and Gout    SUBJECTIVE  Dion Espana presents to [facility]     Hypertension:  Patient is taking Amlodipine Benazepril, Metoprolol.  Patient's Blood Pressure in clinic today is 115/59.  Patient rarely monitors blood pressure at home.  Patient denies chest pain, shortness of air, headache, flushing, abnormal swelling in feet/ankles.    Hyperlipidemia:  Patient is taking Atorvastatin, Fish Oil.  Patient denies nocturnal leg cramps, myalgias.  Patient attempts to maintain a diet low in fat and carbohydrates.    Gout:  Patient is taking Allopurinol with good control of flares.      Seasonal Allergies:  Patient is taking Montelukast, Flonase and doing well.    ED:  Patient is taking Viagra PRN with good results.    PT sees Dr. Alonso and will have PSA checked with her in September 2022 when due    History of Present Illness  Past Medical History:   Diagnosis Date   • Arthritis    • Bone lesion 05/21/2020   • Diverticulitis    • Essential hypertension 10/15/2020   • Gout 10/15/2020   • Hearing loss    • Hemorrhoids    • Hyperlipidemia 10/15/2020   • Lumbago 07/13/2017    LOW BACK PAIN   • Lumbar disc herniation 07/13/2017    LEFT; L3-4   • Lumbar spinal stenosis 05/21/2020   • Paresthesia 05/21/2020   • Prostate cancer (HCC) 05/21/2020   • Radiculopathy, lumbosacral region 05/21/2020   • Renal insufficiency 10/15/2020   • Sciatica 07/13/2017   • Tinnitus       Family History   Problem Relation Age of Onset   • Arthritis Mother    • Stroke Mother    • Heart disease Mother    • Diabetes Mother    • Heart disease Father    • Stroke Brother    • Colon cancer Neg Hx       Past Surgical History:   Procedure Laterality Date   • BACK SURGERY      X2   • CARPAL TUNNEL RELEASE     • CHOLECYSTECTOMY     • COLONOSCOPY  2006    DR HADLEY   • HAND SURGERY      THUMB SURGERY   • KNEE ARTHROSCOPY Bilateral    • LUMBAR DISCECTOMY  " 07/19/2017    L3-4; DR UGARTE; MINIMALLY INVASIVE   • OTHER SURGICAL HISTORY      FISTULA   • PROSTATE BIOPSY     • PROSTATECTOMY          Current Outpatient Medications:   •  allopurinol (ZYLOPRIM) 300 MG tablet, Take 1 tablet by mouth Daily., Disp: 90 tablet, Rfl: 1  •  amLODIPine-benazepril (LOTREL 5-20) 5-20 MG per capsule, Take 1 capsule by mouth Daily., Disp: 90 capsule, Rfl: 1  •  atorvastatin (LIPITOR) 20 MG tablet, Take 1 tablet by mouth Every Night., Disp: 90 tablet, Rfl: 1  •  Krill Oil (Omega-3) 500 MG capsule, krill oil 500 mg oral capsule take 1 capsule by oral route daily   Active, Disp: , Rfl:   •  lidocaine (LIDODERM) 5 %, Place 1 patch on the skin as directed by provider Daily. Remove & Discard patch within 12 hours or as directed by MD, Disp: 30 each, Rfl: 10  •  metoprolol succinate XL (TOPROL-XL) 100 MG 24 hr tablet, Take 1 tablet by mouth Daily., Disp: 90 tablet, Rfl: 1  •  montelukast (Singulair) 10 MG tablet, Take 1 tablet by mouth Every Night., Disp: 90 tablet, Rfl: 1  •  sildenafil (Viagra) 100 MG tablet, Take 1 tablet by mouth Daily As Needed for Erectile Dysfunction., Disp: 30 tablet, Rfl: 3    OBJECTIVE  Vital Signs:   /69 (BP Location: Left arm, Patient Position: Sitting, Cuff Size: Adult)   Pulse 50   Temp 98.2 °F (36.8 °C) (Oral)   Ht 180.3 cm (71\")   Wt 91.9 kg (202 lb 9.6 oz)   SpO2 98%   BMI 28.26 kg/m²    Estimated body mass index is 28.26 kg/m² as calculated from the following:    Height as of this encounter: 180.3 cm (71\").    Weight as of this encounter: 91.9 kg (202 lb 9.6 oz).     Wt Readings from Last 3 Encounters:   07/28/22 91.9 kg (202 lb 9.6 oz)   06/14/22 93 kg (205 lb)   04/05/22 96.4 kg (212 lb 9.6 oz)     BP Readings from Last 3 Encounters:   07/28/22 115/69   06/14/22 117/68 04/05/22 138/67       Physical Exam  Constitutional:       General: He is not in acute distress.     Appearance: Normal appearance. He is not ill-appearing.   HENT:      Head: " Normocephalic and atraumatic.      Right Ear: Tympanic membrane, ear canal and external ear normal.      Left Ear: Tympanic membrane, ear canal and external ear normal.      Mouth/Throat:      Pharynx: No oropharyngeal exudate or posterior oropharyngeal erythema.   Cardiovascular:      Rate and Rhythm: Normal rate and regular rhythm.      Heart sounds: Normal heart sounds. No murmur heard.  Pulmonary:      Effort: Pulmonary effort is normal. No respiratory distress.      Breath sounds: Normal breath sounds.   Chest:      Chest wall: No tenderness.   Abdominal:      General: There is no distension.      Palpations: There is no mass.      Tenderness: There is no abdominal tenderness. There is no guarding.   Musculoskeletal:         General: No swelling or tenderness. Normal range of motion.      Cervical back: Normal range of motion and neck supple.   Skin:     General: Skin is warm and dry.      Findings: No rash.   Neurological:      General: No focal deficit present.      Mental Status: He is alert and oriented to person, place, and time. Mental status is at baseline.      Gait: Gait normal.   Psychiatric:         Mood and Affect: Mood normal.         Behavior: Behavior normal.         Thought Content: Thought content normal.         Judgment: Judgment normal.          Result Review        No Images in the past 120 days found..     The above data has been reviewed by NICOLAS Gilliland 07/28/2022 08:16 EDT.          Patient Care Team:  Da Self PA as PCP - General (Physician Assistant)      ASSESSMENT & PLAN    Diagnoses and all orders for this visit:    1. Benign essential hypertension (Primary)  Comments:  stbale on Lotrel 5/20mg and metoprolol 100mg, continue  Orders:  -     amLODIPine-benazepril (LOTREL 5-20) 5-20 MG per capsule; Take 1 capsule by mouth Daily.  Dispense: 90 capsule; Refill: 1  -     metoprolol succinate XL (TOPROL-XL) 100 MG 24 hr tablet; Take 1 tablet by mouth Daily.  Dispense: 90 tablet;  Refill: 1  -     CBC w AUTO Differential; Future    2. Gout, unspecified cause, unspecified chronicity, unspecified site  Comments:  stable on allopurinol 300mg, continue  Overview:  Controlled with allopurinol 300mg       Orders:  -     allopurinol (ZYLOPRIM) 300 MG tablet; Take 1 tablet by mouth Daily.  Dispense: 90 tablet; Refill: 1    3. Pure hypercholesterolemia  Comments:  stable on lipitor 20mg, continue  Overview:  Controlled lipitor 20mg QHS     Orders:  -     atorvastatin (LIPITOR) 20 MG tablet; Take 1 tablet by mouth Every Night.  Dispense: 90 tablet; Refill: 1  -     Comprehensive metabolic panel; Future  -     Lipid panel; Future    4. Chronic sinusitis, unspecified location    5. Erectile dysfunction, unspecified erectile dysfunction type  Comments:  stable on viagra 100mg, continue  Orders:  -     sildenafil (Viagra) 100 MG tablet; Take 1 tablet by mouth Daily As Needed for Erectile Dysfunction.  Dispense: 30 tablet; Refill: 3    6. Allergy, subsequent encounter  -     montelukast (Singulair) 10 MG tablet; Take 1 tablet by mouth Every Night.  Dispense: 90 tablet; Refill: 1    7. Screening for thyroid disorder  -     TSH; Future    8. Vitamin D deficiency  -     Vitamin D 25 hydroxy; Future       Tobacco Use: Low Risk    • Smoking Tobacco Use: Never Smoker   • Smokeless Tobacco Use: Never Used       Follow Up     Return in about 6 months (around 1/28/2023).      Patient was given instructions and counseling regarding his condition or for health maintenance advice. Please see specific information pulled into the AVS if appropriate.   I have reviewed information obtained and documented by others and I have confirmed the accuracy of this documented note.    NICOLAS Gilliland

## 2022-07-29 ENCOUNTER — TELEPHONE (OUTPATIENT)
Dept: FAMILY MEDICINE CLINIC | Facility: CLINIC | Age: 67
End: 2022-07-29

## 2022-07-29 DIAGNOSIS — E78.2 ELEVATED TRIGLYCERIDES WITH HIGH CHOLESTEROL: Primary | ICD-10-CM

## 2022-07-29 NOTE — TELEPHONE ENCOUNTER
----- Message from NICOLAS Gilliland sent at 7/29/2022  8:27 AM EDT -----  Trig a little elevated encourage low trig diet and daily cardio exercise recheck in 6 months

## 2022-08-24 ENCOUNTER — OFFICE VISIT (OUTPATIENT)
Dept: FAMILY MEDICINE CLINIC | Facility: CLINIC | Age: 67
End: 2022-08-24

## 2022-08-24 VITALS
SYSTOLIC BLOOD PRESSURE: 130 MMHG | HEIGHT: 71 IN | TEMPERATURE: 97.6 F | OXYGEN SATURATION: 97 % | HEART RATE: 69 BPM | RESPIRATION RATE: 20 BRPM | BODY MASS INDEX: 28.59 KG/M2 | DIASTOLIC BLOOD PRESSURE: 78 MMHG | WEIGHT: 204.2 LBS

## 2022-08-24 DIAGNOSIS — Z85.46 HISTORY OF PROSTATE CANCER: ICD-10-CM

## 2022-08-24 DIAGNOSIS — E78.00 PURE HYPERCHOLESTEROLEMIA: ICD-10-CM

## 2022-08-24 DIAGNOSIS — I10 BENIGN ESSENTIAL HYPERTENSION: ICD-10-CM

## 2022-08-24 DIAGNOSIS — Z23 COVID-19 VACCINE ADMINISTERED: Primary | ICD-10-CM

## 2022-08-24 DIAGNOSIS — E55.9 VITAMIN D DEFICIENCY: ICD-10-CM

## 2022-08-24 DIAGNOSIS — M10.9 GOUT, UNSPECIFIED CAUSE, UNSPECIFIED CHRONICITY, UNSPECIFIED SITE: ICD-10-CM

## 2022-08-24 DIAGNOSIS — J30.2 SEASONAL ALLERGIES: ICD-10-CM

## 2022-08-24 PROCEDURE — 0004A: CPT | Performed by: NURSE PRACTITIONER

## 2022-08-24 PROCEDURE — 0051A COVID-19 (PFIZER) 12+ YRS: CPT | Performed by: NURSE PRACTITIONER

## 2022-08-24 PROCEDURE — 99214 OFFICE O/P EST MOD 30 MIN: CPT | Performed by: NURSE PRACTITIONER

## 2022-08-24 PROCEDURE — 91305 COVID-19 (PFIZER) 12+ YRS: CPT | Performed by: NURSE PRACTITIONER

## 2022-08-24 NOTE — PROGRESS NOTES
Chief Complaint  Establish Care, Hyperlipidemia, and Hypertension    Subjective          Dion Espana presents to [facility]  History of Present Illness  He is here to establish care from Da Self who is retiring soon.  He does wish to get his COVID booster today.  Hypertension: He is currently on amlodipine BenzePrO with no issues noted.  He also takes metoprolol on a daily basis.  He just had his labs done in July with Teagan Gibson.  Lipidemia: He is currently on Lipitor daily.  His cholesterol is well controlled.  His triglycerides are slightly elevated but they are much better than a few years ago.  No leg pain noted that he is aware of.  Allergies he takes the Singulair daily with no issues.  He does not have to take a Claritin or Zyrtec with it though he is aware he can.  ED: He takes the Viagra as needed and he currently also sees urology for the prostate.  He has had his prostate removed in the past.  Gout: He does take allopurinol on a daily basis and has not had a flare of gout recently.  He has had his colonoscopy in 2021 and is not due for 10 years.  He will be due his second pneumonia vaccine in January when we see him back.        Allergies  Patient has no known allergies.    Social History     Tobacco Use   • Smoking status: Never Smoker   • Smokeless tobacco: Never Used   Vaping Use   • Vaping Use: Never used   Substance Use Topics   • Alcohol use: Not Currently   • Drug use: Never       Family History   Problem Relation Age of Onset   • Arthritis Mother    • Stroke Mother    • Heart disease Mother    • Diabetes Mother    • Heart disease Father    • Stroke Brother    • Colon cancer Neg Hx         Health Maintenance Due   Topic Date Due   • ANNUAL PHYSICAL  Never done   • COVID-19 Vaccine (4 - Booster for Pfizer series) 03/29/2022        Immunization History   Administered Date(s) Administered   • COVID-19 (PFIZER) PURPLE CAP 03/15/2021, 04/05/2021,  "11/29/2021   • FluLaval/Fluzone >6mos 10/30/2019   • Fluzone Quad >6mos (Multi-dose) 10/30/2019, 10/15/2020   • Hepatitis A 07/31/2018, 03/14/2019   • Pneumococcal Conjugate 13-Valent (PCV13) 01/25/2022   • Shingrix 02/26/2018, 08/10/2018       Review of Systems   Constitutional: Negative for fatigue.   Respiratory: Negative for cough and shortness of breath.    Cardiovascular: Negative for chest pain.   Gastrointestinal: Negative for diarrhea, nausea and vomiting.        Objective       Vitals:    08/24/22 1408   BP: 130/78   Pulse: 69   Resp: 20   Temp: 97.6 °F (36.4 °C)   SpO2: 97%   Weight: 92.6 kg (204 lb 3.2 oz)   Height: 180.3 cm (71\")       Body mass index is 28.48 kg/m².         Physical Exam  Vitals reviewed.   Constitutional:       Appearance: Normal appearance. He is well-developed.   Cardiovascular:      Rate and Rhythm: Normal rate and regular rhythm.      Heart sounds: Normal heart sounds. No murmur heard.  Pulmonary:      Effort: Pulmonary effort is normal.      Breath sounds: Normal breath sounds.   Neurological:      Mental Status: He is alert and oriented to person, place, and time.      Cranial Nerves: No cranial nerve deficit.      Motor: No weakness.   Psychiatric:         Mood and Affect: Mood and affect normal.             Result Review :     The following data was reviewed by: NICOLAS Davies on 08/24/2022:    Common labs    Common Labsle 1/25/22 1/25/22 1/25/22 4/5/22 4/5/22 7/28/22 7/28/22 7/28/22    0926 0926 0926 1342 1342 0847 0847 0847   Glucose   91    90    BUN   15    14    Creatinine   0.95    1.05    eGFR Non African Am   80        Sodium   140    140    Potassium   3.8    4.6    Chloride   101    103    Calcium   9.2    9.2    Total Protein     7.1      Albumin   4.40 4.40 3.7  4.30    Total Bilirubin   1.3 (A) 0.8   0.7    Alkaline Phosphatase   123 (A) 100   99    AST (SGOT)   19 23   11    ALT (SGPT)   17 24   12    WBC 6.67     6.94     Hemoglobin 15.0     14.4   "   Hematocrit 44.0     42.5     Platelets 259     262     Total Cholesterol  145      131   Triglycerides  198 (A)      192 (A)   HDL Cholesterol  33 (A)      37 (A)   LDL Cholesterol   78      62   (A) Abnormal value                               Assessment and Plan      Diagnoses and all orders for this visit:    1. COVID-19 vaccine administered (Primary)  -     COVID-19 Vaccine (Pfizer) Gray Cap    2. Pure hypercholesterolemia    3. Vitamin D deficiency    4. Benign essential hypertension    5. History of prostate cancer    6. Gout, unspecified cause, unspecified chronicity, unspecified site    7. Seasonal allergies        I spent 34 minutes caring for Dion on this date of service. This time includes time spent by me in the following activities:preparing for the visit, reviewing tests, performing a medically appropriate examination and/or evaluation  and documenting information in the medical record    Follow Up     Return in about 5 months (around 1/24/2023).  We will do a follow-up in 5 months as his refills will be due at that time.  He will come in fasting the day of for the appointment.  Call with questions or concerns.  We did review his health history along with surgical history.  We also reviewed his family history.  He is retired currently.  Patient was given instructions and counseling regarding his condition or for health maintenance advice. Please see specific information pulled into the AVS if appropriate.         Kenia Mclean, APRN  08/24/2022

## 2022-08-26 ENCOUNTER — PATIENT ROUNDING (BHMG ONLY) (OUTPATIENT)
Dept: FAMILY MEDICINE CLINIC | Facility: CLINIC | Age: 67
End: 2022-08-26

## 2022-08-26 NOTE — PROGRESS NOTES
Date: 8/26/22    Hello, may I speak with Dion Reyes?    My name is Estela    I am  with [facility]  [address] [place] [address] [phone].    Before we get started may I verify your date of birth? [birth year]    I am calling to officially welcome you to our practice and ask about your recent visit. Is this a good time to talk? yes    Tell me about your visit with us. What things went well?  She was very good. I think I have my wife recruited to come see her. The entire staff was very polite and efficient. She took time to talk to me. I liked that they asked me for a co-pay.      We're always looking for ways to make our patients' experiences even better. Do you have recommendations on ways we may improve?  no    Overall were you satisfied with your first visit to our practice? yes       I appreciate you taking the time to speak with me today. Is there anything else I can do for you? yes      Thank you, and have a great day.

## 2022-09-15 ENCOUNTER — LAB (OUTPATIENT)
Dept: LAB | Facility: HOSPITAL | Age: 67
End: 2022-09-15

## 2022-09-15 DIAGNOSIS — Z85.46 HISTORY OF PROSTATE CANCER: ICD-10-CM

## 2022-09-15 LAB — PSA SERPL-MCNC: 0.17 NG/ML (ref 0–4)

## 2022-09-15 PROCEDURE — 36415 COLL VENOUS BLD VENIPUNCTURE: CPT

## 2022-09-15 PROCEDURE — 84153 ASSAY OF PSA TOTAL: CPT

## 2022-09-19 ENCOUNTER — OFFICE VISIT (OUTPATIENT)
Dept: UROLOGY | Facility: CLINIC | Age: 67
End: 2022-09-19

## 2022-09-19 VITALS — HEIGHT: 71 IN | RESPIRATION RATE: 16 BRPM | WEIGHT: 204.6 LBS | BODY MASS INDEX: 28.64 KG/M2

## 2022-09-19 DIAGNOSIS — Z85.46 HISTORY OF PROSTATE CANCER: Primary | ICD-10-CM

## 2022-09-19 LAB
BILIRUB BLD-MCNC: NEGATIVE MG/DL
CLARITY, POC: CLEAR
COLOR UR: YELLOW
EXPIRATION DATE: ABNORMAL
GLUCOSE UR STRIP-MCNC: NEGATIVE MG/DL
KETONES UR QL: NEGATIVE
LEUKOCYTE EST, POC: NEGATIVE
Lab: ABNORMAL
NITRITE UR-MCNC: NEGATIVE MG/ML
PH UR: 6 [PH] (ref 5–8)
PROT UR STRIP-MCNC: ABNORMAL MG/DL
RBC # UR STRIP: NEGATIVE /UL
SP GR UR: 1.02 (ref 1–1.03)
UROBILINOGEN UR QL: NORMAL

## 2022-09-19 PROCEDURE — 99213 OFFICE O/P EST LOW 20 MIN: CPT | Performed by: UROLOGY

## 2022-09-19 PROCEDURE — 81003 URINALYSIS AUTO W/O SCOPE: CPT | Performed by: UROLOGY

## 2022-09-19 NOTE — PROGRESS NOTES
"Chief Complaint  Prostate Cancer    Subjective          Dion Espana presents to [facility]  History of Present Illness  67 yo male s/p RALP with bilateral PLND for intermediate risk prostate cancer.   Surgery in Sept 2016  PSA 4.7  Biopsy - 3+4 L mid and 3+3 R base  cT1c     Final Path - Surgery in Sept.   [facility] ([facility])  [id number]  margins were negative     Patient is pad free. He has tried Viagra for erections with about 50% erection with this. We discussed ICI today and he is interested in this. I will provide him with hand out for that and he will contact me should he want to have that.      3/26/18 - Patient is doing well. He is pad free. He denies urinary issues. The patient had PSA in March 2017 that was 0.02. He had this repeated 6 months ago and was 0.02. Prior to this in Dec 2016, PSA was undetectable. I have recommended checking the PSA today. The patient does not want to do ICI. He says his erections \"come and go\".     7/2/18 - Patient is doing well. He presents in follow up. He is doing well. He is pad free. His most recent PSA in March was <0.01. We reviewed this. I have recommended continuing to follow the PSA and checking that today. For erections, we discussed ICI. He does not want to do that at this point, but will consider that going forward.     1/7/19 - Patient presents in follow up. He is doing well. His last PSA in July was 0.04. His doubling time was 10 months. He has not had a PSA since. He states that he is doing well. The patient is pad free. He would like to try Sildenafil 100mg for ED. We discussed side effects including HA, facial flushing, slight drop in BP and erection that lasts over 4 hours.     7/30/19 - Patient presents in follow up. He is doing well. He has not had a PSA since I last saw him in Jan 2019. His PSA at that time was 0.04. His PSA 1 year ago was 0.04. I would like to have that drawn. He is pad free. He is using " "Sildenafil and has HAs and some vision changes with this. He describes his erections as \"coming and going\".     1/27/20:  Patient is here for routine follow up.  He is doing well.  His PSA is less than 0.01 recently.  He is having no issues with incontinence and is happy today with no new issues.     7/27/20:  Patient is here for routine follow up.  He is doing well with no new issues.  His most recent PSA has not been done.     9/15/21:  PSA most recently is 0.09.  This is stable.  He has no other new issues.     9/19/22: His recent PSA is 0.165.  This is up slightly from last year.      Objective   Vital Signs:   Resp 16   Ht 180.3 cm (71\")   Wt 92.8 kg (204 lb 9.6 oz)   BMI 28.54 kg/m²       Physical Exam  Vitals and nursing note reviewed.   Constitutional:       Appearance: Normal appearance. He is well-developed.   Pulmonary:      Effort: Pulmonary effort is normal.      Breath sounds: Normal air entry.   Neurological:      Mental Status: He is alert and oriented to person, place, and time.      Motor: Motor function is intact.   Psychiatric:         Mood and Affect: Mood normal.         Behavior: Behavior normal.          Result Review :   The following data was reviewed by: Silvana Alonso MD on 09/19/2022:    Results for orders placed or performed in visit on 09/19/22   POC Urinalysis Dipstick, Automated    Specimen: Urine   Result Value Ref Range    Color Yellow Yellow, Straw, Dark Yellow, Marce    Clarity, UA Clear Clear    Specific Gravity  1.025 1.005 - 1.030    pH, Urine 6.0 5.0 - 8.0    Leukocytes Negative Negative    Nitrite, UA Negative Negative    Protein, POC Trace (A) Negative mg/dL    Glucose, UA Negative Negative mg/dL    Ketones, UA Negative Negative    Urobilinogen, UA Normal Normal, 0.2 E.U./dL    Bilirubin Negative Negative    Blood, UA Negative Negative    Lot Number [id number]     Expiration Date 08/31/23      PSA    PSA 9/15/22   PSA 0.165                  Assessment and Plan  "   Diagnoses and all orders for this visit:    1. History of prostate cancer (Primary)  Assessment & Plan:  Patient's PSA is slowly rising.  At 0.165 now.  We will recheck in 6 months instead of waiting a year.  I will see him back at that time.    Orders:  -     POC Urinalysis Dipstick, Automated  -     PSA DIAGNOSTIC; Future          Follow Up       No follow-ups on file.  Patient was given instructions and counseling regarding his condition or for health maintenance advice. Please see specific information pulled into the AVS if appropriate.

## 2022-09-19 NOTE — ASSESSMENT & PLAN NOTE
Patient's PSA is slowly rising.  At 0.165 now.  We will recheck in 6 months instead of waiting a year.  I will see him back at that time.

## 2022-10-13 ENCOUNTER — FLU SHOT (OUTPATIENT)
Dept: FAMILY MEDICINE CLINIC | Facility: CLINIC | Age: 67
End: 2022-10-13

## 2022-10-13 DIAGNOSIS — Z23 NEED FOR INFLUENZA VACCINATION: Primary | ICD-10-CM

## 2022-10-13 PROCEDURE — 90471 IMMUNIZATION ADMIN: CPT | Performed by: NURSE PRACTITIONER

## 2022-10-13 PROCEDURE — 90662 IIV NO PRSV INCREASED AG IM: CPT | Performed by: NURSE PRACTITIONER

## 2023-01-12 ENCOUNTER — OFFICE VISIT (OUTPATIENT)
Dept: FAMILY MEDICINE CLINIC | Facility: CLINIC | Age: 68
End: 2023-01-12
Payer: COMMERCIAL

## 2023-01-12 VITALS
DIASTOLIC BLOOD PRESSURE: 73 MMHG | OXYGEN SATURATION: 98 % | WEIGHT: 215.9 LBS | SYSTOLIC BLOOD PRESSURE: 138 MMHG | HEART RATE: 54 BPM | BODY MASS INDEX: 30.23 KG/M2 | TEMPERATURE: 97.6 F | HEIGHT: 71 IN | RESPIRATION RATE: 14 BRPM

## 2023-01-12 DIAGNOSIS — E78.00 PURE HYPERCHOLESTEROLEMIA: ICD-10-CM

## 2023-01-12 DIAGNOSIS — M10.9 GOUT, UNSPECIFIED CAUSE, UNSPECIFIED CHRONICITY, UNSPECIFIED SITE: ICD-10-CM

## 2023-01-12 DIAGNOSIS — F51.01 PRIMARY INSOMNIA: ICD-10-CM

## 2023-01-12 DIAGNOSIS — N52.9 ERECTILE DYSFUNCTION, UNSPECIFIED ERECTILE DYSFUNCTION TYPE: ICD-10-CM

## 2023-01-12 DIAGNOSIS — Z23 NEED FOR VACCINATION: ICD-10-CM

## 2023-01-12 DIAGNOSIS — T78.40XD ALLERGY, SUBSEQUENT ENCOUNTER: ICD-10-CM

## 2023-01-12 DIAGNOSIS — I10 BENIGN ESSENTIAL HYPERTENSION: Primary | ICD-10-CM

## 2023-01-12 LAB
ALBUMIN SERPL-MCNC: 4.3 G/DL (ref 3.5–5.2)
ALBUMIN/GLOB SERPL: 1.7 G/DL
ALP SERPL-CCNC: 104 U/L (ref 39–117)
ALT SERPL W P-5'-P-CCNC: 15 U/L (ref 1–41)
ANION GAP SERPL CALCULATED.3IONS-SCNC: 11.9 MMOL/L (ref 5–15)
AST SERPL-CCNC: 14 U/L (ref 1–40)
BASOPHILS # BLD AUTO: 0.06 10*3/MM3 (ref 0–0.2)
BASOPHILS NFR BLD AUTO: 1.1 % (ref 0–1.5)
BILIRUB SERPL-MCNC: 0.6 MG/DL (ref 0–1.2)
BUN SERPL-MCNC: 14 MG/DL (ref 8–23)
BUN/CREAT SERPL: 15.4 (ref 7–25)
CALCIUM SPEC-SCNC: 9.3 MG/DL (ref 8.6–10.5)
CHLORIDE SERPL-SCNC: 105 MMOL/L (ref 98–107)
CHOLEST SERPL-MCNC: 114 MG/DL (ref 0–200)
CO2 SERPL-SCNC: 24.1 MMOL/L (ref 22–29)
CREAT SERPL-MCNC: 0.91 MG/DL (ref 0.76–1.27)
DEPRECATED RDW RBC AUTO: 40.8 FL (ref 37–54)
EGFRCR SERPLBLD CKD-EPI 2021: 93 ML/MIN/1.73
EOSINOPHIL # BLD AUTO: 0.17 10*3/MM3 (ref 0–0.4)
EOSINOPHIL NFR BLD AUTO: 3.2 % (ref 0.3–6.2)
ERYTHROCYTE [DISTWIDTH] IN BLOOD BY AUTOMATED COUNT: 13 % (ref 12.3–15.4)
GLOBULIN UR ELPH-MCNC: 2.5 GM/DL
GLUCOSE SERPL-MCNC: 91 MG/DL (ref 65–99)
HCT VFR BLD AUTO: 41.2 % (ref 37.5–51)
HDLC SERPL-MCNC: 29 MG/DL (ref 40–60)
HGB BLD-MCNC: 13.9 G/DL (ref 13–17.7)
IMM GRANULOCYTES # BLD AUTO: 0.03 10*3/MM3 (ref 0–0.05)
IMM GRANULOCYTES NFR BLD AUTO: 0.6 % (ref 0–0.5)
LDLC SERPL CALC-MCNC: 53 MG/DL (ref 0–100)
LDLC/HDLC SERPL: 1.58 {RATIO}
LYMPHOCYTES # BLD AUTO: 1.45 10*3/MM3 (ref 0.7–3.1)
LYMPHOCYTES NFR BLD AUTO: 27.3 % (ref 19.6–45.3)
MCH RBC QN AUTO: 29 PG (ref 26.6–33)
MCHC RBC AUTO-ENTMCNC: 33.7 G/DL (ref 31.5–35.7)
MCV RBC AUTO: 86 FL (ref 79–97)
MONOCYTES # BLD AUTO: 0.82 10*3/MM3 (ref 0.1–0.9)
MONOCYTES NFR BLD AUTO: 15.4 % (ref 5–12)
NEUTROPHILS NFR BLD AUTO: 2.79 10*3/MM3 (ref 1.7–7)
NEUTROPHILS NFR BLD AUTO: 52.4 % (ref 42.7–76)
NRBC BLD AUTO-RTO: 0 /100 WBC (ref 0–0.2)
PLATELET # BLD AUTO: 257 10*3/MM3 (ref 140–450)
PMV BLD AUTO: 9.7 FL (ref 6–12)
POTASSIUM SERPL-SCNC: 4 MMOL/L (ref 3.5–5.2)
PROT SERPL-MCNC: 6.8 G/DL (ref 6–8.5)
RBC # BLD AUTO: 4.79 10*6/MM3 (ref 4.14–5.8)
SODIUM SERPL-SCNC: 141 MMOL/L (ref 136–145)
TRIGL SERPL-MCNC: 196 MG/DL (ref 0–150)
TSH SERPL DL<=0.05 MIU/L-ACNC: 2.6 UIU/ML (ref 0.27–4.2)
URATE SERPL-MCNC: 5.2 MG/DL (ref 3.4–7)
VLDLC SERPL-MCNC: 32 MG/DL (ref 5–40)
WBC NRBC COR # BLD: 5.32 10*3/MM3 (ref 3.4–10.8)

## 2023-01-12 PROCEDURE — 84550 ASSAY OF BLOOD/URIC ACID: CPT | Performed by: NURSE PRACTITIONER

## 2023-01-12 PROCEDURE — 80050 GENERAL HEALTH PANEL: CPT | Performed by: NURSE PRACTITIONER

## 2023-01-12 PROCEDURE — 0124A COVID-19 (PFIZER) BIVALENT BOOSTER 12+YRS: CPT | Performed by: NURSE PRACTITIONER

## 2023-01-12 PROCEDURE — 80061 LIPID PANEL: CPT | Performed by: NURSE PRACTITIONER

## 2023-01-12 PROCEDURE — 99214 OFFICE O/P EST MOD 30 MIN: CPT | Performed by: NURSE PRACTITIONER

## 2023-01-12 PROCEDURE — 91312 COVID-19 (PFIZER) BIVALENT BOOSTER 12+YRS: CPT | Performed by: NURSE PRACTITIONER

## 2023-01-12 RX ORDER — MONTELUKAST SODIUM 10 MG/1
10 TABLET ORAL NIGHTLY
Qty: 90 TABLET | Refills: 1 | Status: SHIPPED | OUTPATIENT
Start: 2023-01-12

## 2023-01-12 RX ORDER — SILDENAFIL 100 MG/1
100 TABLET, FILM COATED ORAL DAILY PRN
Qty: 30 TABLET | Refills: 3 | Status: SHIPPED | OUTPATIENT
Start: 2023-01-12

## 2023-01-12 RX ORDER — METOPROLOL SUCCINATE 100 MG/1
100 TABLET, EXTENDED RELEASE ORAL DAILY
Qty: 90 TABLET | Refills: 1 | Status: SHIPPED | OUTPATIENT
Start: 2023-01-12

## 2023-01-12 RX ORDER — AMLODIPINE BESYLATE AND BENAZEPRIL HYDROCHLORIDE 5; 20 MG/1; MG/1
1 CAPSULE ORAL DAILY
Qty: 90 CAPSULE | Refills: 1 | Status: SHIPPED | OUTPATIENT
Start: 2023-01-12

## 2023-01-12 RX ORDER — ALLOPURINOL 300 MG/1
300 TABLET ORAL DAILY
Qty: 90 TABLET | Refills: 1 | Status: SHIPPED | OUTPATIENT
Start: 2023-01-12

## 2023-01-12 RX ORDER — ATORVASTATIN CALCIUM 20 MG/1
20 TABLET, FILM COATED ORAL NIGHTLY
Qty: 90 TABLET | Refills: 1 | Status: SHIPPED | OUTPATIENT
Start: 2023-01-12

## 2023-01-12 NOTE — PROGRESS NOTES
Answers for HPI/ROS submitted by the patient on 1/5/2023  What is the primary reason for your visit?: Physical    Chief Complaint  Hyperlipidemia, Hypertension, and Insomnia (States sleeping 4-5 hours)    Subjective          Dion Espana presents to [facility]  History of Present Illness  Hypertension:  Patient is taking Amlodipine Benazepril, Metoprolol.    No chest pain or shortness of breath.    Hyperlipidemia:  He is taking atorvastatin, Fish Oil.    Gout:  He is on allopurinol with good control--no flares recently.    Allergies: He is on singular and flonase and doing ok with his medications.   ED/Prostate Cancer (removed):  He takes the viagra as needed and has good results when using.     He is having some sleeping issues.  He has tried melatonin but not on a daily basis.  He gets about 4 to 5 hours of sleep.    Allergies  Patient has no known allergies.    Social History     Tobacco Use   • Smoking status: Never   • Smokeless tobacco: Never   Vaping Use   • Vaping Use: Never used   Substance Use Topics   • Alcohol use: Not Currently   • Drug use: Never       Family History   Problem Relation Age of Onset   • Arthritis Mother    • Stroke Mother    • Heart disease Mother    • Diabetes Mother    • Heart disease Father    • Stroke Brother    • Colon cancer Neg Hx         There are no preventive care reminders to display for this patient.     Immunization History   Administered Date(s) Administered   • COVID-19 (PFIZER) BIVALENT BOOSTER 12+YRS 01/12/2023   • COVID-19 (PFIZER) PURPLE CAP 03/15/2021, 04/05/2021, 11/29/2021   • Covid-19 (Pfizer) Gray Cap 08/24/2022   • FluLaval/Fluzone >6mos 10/30/2019   • Fluzone High-Dose 65+yrs 10/13/2022   • Fluzone Quad >6mos (Multi-dose) 10/30/2019, 10/15/2020   • Hepatitis A 07/31/2018, 03/14/2019   • Pneumococcal Conjugate 13-Valent (PCV13) 01/25/2022   • Shingrix 02/26/2018, 08/10/2018       Review of Systems   Constitutional:  "Negative for fatigue.   Respiratory: Negative for cough and shortness of breath.    Cardiovascular: Negative for chest pain.   Gastrointestinal: Negative for diarrhea, nausea and vomiting.        Objective       Vitals:    01/12/23 0720   BP: 138/73   Pulse: 54   Resp: 14   Temp: 97.6 °F (36.4 °C)   SpO2: 98%   Weight: 97.9 kg (215 lb 14.4 oz)   Height: 180.3 cm (71\")       Body mass index is 30.11 kg/m².         Physical Exam  Vitals reviewed.   Constitutional:       Appearance: Normal appearance. He is well-developed.   Cardiovascular:      Rate and Rhythm: Normal rate and regular rhythm.      Heart sounds: Normal heart sounds. No murmur heard.  Pulmonary:      Effort: Pulmonary effort is normal.      Breath sounds: Normal breath sounds.   Neurological:      Mental Status: He is alert and oriented to person, place, and time.      Cranial Nerves: No cranial nerve deficit.      Motor: No weakness.   Psychiatric:         Mood and Affect: Mood and affect normal.             Result Review :     The following data was reviewed by: NICOLAS Davies on 01/12/2023:    Common labs    Common Labs 4/5/22 4/5/22 7/28/22 7/28/22 7/28/22 9/15/22    1342 1342 0847 0847 0847    Glucose    90     BUN    14     Creatinine    1.05     Sodium    140     Potassium    4.6     Chloride    103     Calcium    9.2     Total Protein  7.1       Albumin 4.40 3.7  4.30     Total Bilirubin 0.8   0.7     Alkaline Phosphatase 100   99     AST (SGOT) 23   11     ALT (SGPT) 24   12     WBC   6.94      Hemoglobin   14.4      Hematocrit   42.5      Platelets   262      Total Cholesterol     131    Triglycerides     192 (A)    HDL Cholesterol     37 (A)    LDL Cholesterol      62    PSA      0.165   (A) Abnormal value                               Assessment and Plan      Diagnoses and all orders for this visit:    1. Benign essential hypertension (Primary)  -     Comprehensive Metabolic Panel  -     CBC & Differential  -     TSH  -     Lipid " Panel  -     metoprolol succinate XL (TOPROL-XL) 100 MG 24 hr tablet; Take 1 tablet by mouth Daily.  Dispense: 90 tablet; Refill: 1  -     amLODIPine-benazepril (LOTREL 5-20) 5-20 MG per capsule; Take 1 capsule by mouth Daily.  Dispense: 90 capsule; Refill: 1    2. Gout, unspecified cause, unspecified chronicity, unspecified site  -     allopurinol (ZYLOPRIM) 300 MG tablet; Take 1 tablet by mouth Daily.  Dispense: 90 tablet; Refill: 1  -     Uric Acid    3. Pure hypercholesterolemia  -     Comprehensive Metabolic Panel  -     CBC & Differential  -     TSH  -     Lipid Panel  -     atorvastatin (LIPITOR) 20 MG tablet; Take 1 tablet by mouth Every Night.  Dispense: 90 tablet; Refill: 1    4. Allergy, subsequent encounter  -     montelukast (Singulair) 10 MG tablet; Take 1 tablet by mouth Every Night.  Dispense: 90 tablet; Refill: 1    5. Erectile dysfunction, unspecified erectile dysfunction type  -     sildenafil (Viagra) 100 MG tablet; Take 1 tablet by mouth Daily As Needed for Erectile Dysfunction.  Dispense: 30 tablet; Refill: 3    6. Need for vaccination  -     COVID-19 Bivalent Booster (Pfizer) 12+yrs    7. Primary insomnia            Follow Up     Return in about 6 months (around 7/12/2023) for Medicare Wellness  anytime after 1/25/2023.  Follow-up in 6 months for labs and appt. Call with any concerns or questions that you may have regarding your medications or history.    I have reviewed all medications and at this time no medications changes need to be adjusted for all chronic conditions.  We will try 3 mg of melatonin on a daily basis about 45 minutes to an hour before he goes to bed.  Keep me posted.  Can go up to 10 mg if needed but let me know if he does that.  Patient was given instructions and counseling regarding his condition or for health maintenance advice. Please see specific information pulled into the AVS if appropriate.         Kenia Mclean, APRN  01/12/2023

## 2023-02-02 ENCOUNTER — OFFICE VISIT (OUTPATIENT)
Dept: FAMILY MEDICINE CLINIC | Facility: CLINIC | Age: 68
End: 2023-02-02
Payer: COMMERCIAL

## 2023-02-02 VITALS
TEMPERATURE: 97.1 F | HEIGHT: 71 IN | SYSTOLIC BLOOD PRESSURE: 133 MMHG | DIASTOLIC BLOOD PRESSURE: 75 MMHG | OXYGEN SATURATION: 98 % | HEART RATE: 57 BPM | RESPIRATION RATE: 14 BRPM | WEIGHT: 214.9 LBS | BODY MASS INDEX: 30.09 KG/M2

## 2023-02-02 DIAGNOSIS — Z00.00 MEDICARE ANNUAL WELLNESS VISIT, SUBSEQUENT: Primary | ICD-10-CM

## 2023-02-02 DIAGNOSIS — Z23 NEED FOR PNEUMOCOCCAL VACCINATION: ICD-10-CM

## 2023-02-02 PROCEDURE — 90677 PCV20 VACCINE IM: CPT | Performed by: NURSE PRACTITIONER

## 2023-02-02 PROCEDURE — G0439 PPPS, SUBSEQ VISIT: HCPCS | Performed by: NURSE PRACTITIONER

## 2023-02-02 PROCEDURE — G0009 ADMIN PNEUMOCOCCAL VACCINE: HCPCS | Performed by: NURSE PRACTITIONER

## 2023-02-02 NOTE — PROGRESS NOTES
The ABCs of the Annual Wellness Visit  Subsequent Medicare Wellness Visit    Subjective    Dion Espana is a 67 y.o. male who presents for a Subsequent Medicare Wellness Visit.    The following portions of the patient's history were reviewed and   updated as appropriate: allergies, current medications, past family history, past medical history, past social history, past surgical history and problem list.    Compared to one year ago, the patient feels his physical   health is the same.    Compared to one year ago, the patient feels his mental   health is the same.    Recent Hospitalizations:  He was not admitted to the hospital during the last year.       Current Medical Providers:  Patient Care Team:  Kenia Mclean APRN as PCP - General (Nurse Practitioner)  Silvana Alonso MD as Consulting Physician (Urology)    Outpatient Medications Prior to Visit   Medication Sig Dispense Refill   • allopurinol (ZYLOPRIM) 300 MG tablet Take 1 tablet by mouth Daily. 90 tablet 1   • amLODIPine-benazepril (LOTREL 5-20) 5-20 MG per capsule Take 1 capsule by mouth Daily. 90 capsule 1   • atorvastatin (LIPITOR) 20 MG tablet Take 1 tablet by mouth Every Night. 90 tablet 1   • Krill Oil (Omega-3) 500 MG capsule krill oil 500 mg oral capsule take 1 capsule by oral route daily   Active     • lidocaine (LIDODERM) 5 % Place 1 patch on the skin as directed by provider Daily. Remove & Discard patch within 12 hours or as directed by MD 30 each 10   • metoprolol succinate XL (TOPROL-XL) 100 MG 24 hr tablet Take 1 tablet by mouth Daily. 90 tablet 1   • montelukast (Singulair) 10 MG tablet Take 1 tablet by mouth Every Night. 90 tablet 1   • sildenafil (Viagra) 100 MG tablet Take 1 tablet by mouth Daily As Needed for Erectile Dysfunction. 30 tablet 3     No facility-administered medications prior to visit.       No opioid medication identified on active medication list. I have reviewed chart for other potential  high risk  "medication/s and harmful drug interactions in the elderly.          Aspirin is not on active medication list.  Aspirin use is not indicated based on review of current medical condition/s. Risk of harm outweighs potential benefits.  .    Patient Active Problem List   Diagnosis   • Arthritis   • Benign essential hypertension   • Bone lesion   • Displacement of lumbar intervertebral disc without myelopathy   • Diverticulitis   • Gout   • Hearing loss   • Hemorrhoids   • History of prostate cancer   • Hyperlipidemia   • Lumbar radiculopathy   • Lumbar spinal stenosis   • Lumbar spondylosis   • Paresthesia   • Renal insufficiency   • Sciatica   • Sciatica   • Tinnitus     Advance Care Planning  Advance Directive is not on file.  ACP discussion was declined by the patient. Patient does not have an advance directive, information provided.     Objective    Vitals:    02/02/23 0733   BP: 133/75   Pulse: 57   Resp: 14   Temp: 97.1 °F (36.2 °C)   SpO2: 98%   Weight: 97.5 kg (214 lb 14.4 oz)   Height: 180.3 cm (71\")     Estimated body mass index is 29.97 kg/m² as calculated from the following:    Height as of this encounter: 180.3 cm (71\").    Weight as of this encounter: 97.5 kg (214 lb 14.4 oz).    BMI is >= 30 and <35. (Class 1 Obesity). The following options were offered after discussion;: nutrition counseling/recommendations      Does the patient have evidence of cognitive impairment? No    Lab Results   Component Value Date    TRIG 196 (H) 01/12/2023    HDL 29 (L) 01/12/2023    LDL 53 01/12/2023    VLDL 32 01/12/2023        HEALTH RISK ASSESSMENT    Smoking Status:  Social History     Tobacco Use   Smoking Status Never   Smokeless Tobacco Never     Alcohol Consumption:  Social History     Substance and Sexual Activity   Alcohol Use Not Currently     Fall Risk Screen:    STEADI Fall Risk Assessment was completed, and patient is at LOW risk for falls.Assessment completed on:2/2/2023    Depression Screening:  PHQ-2/PHQ-9 " Depression Screening 2/2/2023   Little Interest or Pleasure in Doing Things 0-->not at all   Feeling Down, Depressed or Hopeless 0-->not at all   PHQ-9: Brief Depression Severity Measure Score 0       Health Habits and Functional and Cognitive Screening:  Functional & Cognitive Status 2/2/2023   Do you have difficulty preparing food and eating? No   Do you have difficulty bathing yourself, getting dressed or grooming yourself? No   Do you have difficulty using the toilet? No   Do you have difficulty moving around from place to place? No   Do you have trouble with steps or getting out of a bed or a chair? No   Current Diet Unhealthy Diet   Dental Exam Up to date   Eye Exam Up to date   Exercise (times per week) 3 times per week   Current Exercises Include (No Data)        Exercise Comment farm work   Do you need help using the phone?  No   Are you deaf or do you have serious difficulty hearing?  Yes   Do you need help with transportation? No   Do you need help shopping? No   Do you need help preparing meals?  No   Do you need help with housework?  No   Do you need help with laundry? No   Do you need help taking your medications? No   Do you need help managing money? No   Do you ever drive or ride in a car without wearing a seat belt? No   Have you felt unusual stress, anger or loneliness in the last month? No   Who do you live with? Spouse   If you need help, do you have trouble finding someone available to you? No   Have you been bothered in the last four weeks by sexual problems? No   Do you have difficulty concentrating, remembering or making decisions? No       Age-appropriate Screening Schedule:  Refer to the list below for future screening recommendations based on patient's age, sex and/or medical conditions. Orders for these recommended tests are listed in the plan section. The patient has been provided with a written plan.    Health Maintenance   Topic Date Due   • TDAP/TD VACCINES (1 - Tdap) 08/14/2023  (Originally 3/15/1974)   • LIPID PANEL  01/12/2024   • INFLUENZA VACCINE  Completed   • ZOSTER VACCINE  Completed                CMS Preventative Services Quick Reference  Risk Factors Identified During Encounter  Fall Risk-High or Moderate: Discussed Fall Prevention in the home  Immunizations Discussed/Encouraged: Prevnar 20 (Pneumococcal 20-valent conjugate)  The above risks/problems have been discussed with the patient.  Pertinent information has been shared with the patient in the After Visit Summary.  An After Visit Summary and PPPS were made available to the patient.    Follow Up:   Next Medicare Wellness visit to be scheduled in 1 year.       Kenia Mclean, APRN  02/02/2023

## 2023-03-22 ENCOUNTER — LAB (OUTPATIENT)
Dept: LAB | Facility: HOSPITAL | Age: 68
End: 2023-03-22
Payer: COMMERCIAL

## 2023-03-22 DIAGNOSIS — Z85.46 HISTORY OF PROSTATE CANCER: ICD-10-CM

## 2023-03-22 LAB — PSA SERPL-MCNC: 0.18 NG/ML (ref 0–4)

## 2023-03-22 PROCEDURE — 84153 ASSAY OF PSA TOTAL: CPT

## 2023-03-22 PROCEDURE — 36415 COLL VENOUS BLD VENIPUNCTURE: CPT

## 2023-04-03 ENCOUNTER — OFFICE VISIT (OUTPATIENT)
Dept: UROLOGY | Facility: CLINIC | Age: 68
End: 2023-04-03
Payer: COMMERCIAL

## 2023-04-03 VITALS — WEIGHT: 221.2 LBS | BODY MASS INDEX: 30.97 KG/M2 | HEIGHT: 71 IN

## 2023-04-03 DIAGNOSIS — Z85.46 HISTORY OF PROSTATE CANCER: Primary | ICD-10-CM

## 2023-04-03 LAB
BILIRUB BLD-MCNC: NEGATIVE MG/DL
CLARITY, POC: CLEAR
COLOR UR: YELLOW
EXPIRATION DATE: NORMAL
GLUCOSE UR STRIP-MCNC: NEGATIVE MG/DL
KETONES UR QL: NEGATIVE
LEUKOCYTE EST, POC: NEGATIVE
Lab: NORMAL
NITRITE UR-MCNC: NEGATIVE MG/ML
PH UR: 7 [PH] (ref 5–8)
PROT UR STRIP-MCNC: NEGATIVE MG/DL
RBC # UR STRIP: NEGATIVE /UL
SP GR UR: 1.01 (ref 1–1.03)
UROBILINOGEN UR QL: NORMAL

## 2023-04-03 PROCEDURE — 81003 URINALYSIS AUTO W/O SCOPE: CPT | Performed by: UROLOGY

## 2023-04-03 PROCEDURE — 99213 OFFICE O/P EST LOW 20 MIN: CPT | Performed by: UROLOGY

## 2023-04-03 NOTE — PROGRESS NOTES
"Chief Complaint  Prostate Cancer    Subjective          Dion Espana presents to [facility]     The patient presents today for a follow-up.    The patient is doing well. His PSA is slowly rising, but he has not reached the level of biochemical recurrence at this point. He reports that he has under gone a prostatectomy in 2016.     History of Present Illness  History of Present Illness  67 yo male s/p RALP with bilateral PLND for intermediate risk prostate cancer.   Surgery in Sept 2016  PSA 4.7  Biopsy - 3+4 L mid and 3+3 R base  cT1c     Final Path - Surgery in Sept.   [facility] 3+4 ([facility])  [id number]  margins were negative    His PSA is slowly rising.   6 months ago it was 0.165 and it is now 0.180.     He has no other complaints.       Objective   Vital Signs:   Ht 180.3 cm (71\")   Wt 100 kg (221 lb 3.2 oz)   BMI 30.85 kg/m²       Physical Exam  Vitals and nursing note reviewed.   Constitutional:       Appearance: Normal appearance. He is well-developed.   Pulmonary:      Effort: Pulmonary effort is normal.      Breath sounds: Normal air entry.   Neurological:      Mental Status: He is alert and oriented to person, place, and time.      Motor: Motor function is intact.   Psychiatric:         Mood and Affect: Mood normal.         Behavior: Behavior normal.          Result Review :   The following data was reviewed by: Silvana Alonso MD on 04/03/2023:    Results for orders placed or performed in visit on 04/03/23   POC Urinalysis Dipstick, Automated    Specimen: Urine   Result Value Ref Range    Color Yellow Yellow, Straw, Dark Yellow, Marce    Clarity, UA Clear Clear    Specific Gravity  1.015 1.005 - 1.030    pH, Urine 7.0 5.0 - 8.0    Leukocytes Negative Negative    Nitrite, UA Negative Negative    Protein, POC Negative Negative mg/dL    Glucose, UA Negative Negative mg/dL    Ketones, UA Negative Negative    Urobilinogen, UA 0.2 E.U./dL Normal, 0.2 E.U./dL    " Bilirubin Negative Negative    Blood, UA Negative Negative    Lot Number [id number]     Expiration Date 62,024        PSA    PSA 9/15/22 3/22/23   PSA 0.165 0.180                  Assessment and Plan    Diagnoses and all orders for this visit:    1. History of prostate cancer (Primary)  Assessment & Plan:  - The patient is to follow-up in 6 months with a PSA prior.  - The patients PSA is slowly rising, but he has not reached the level of biochemical recurrence at this point.   - We will continue to monitor.    Orders:  -     POC Urinalysis Dipstick, Automated  -     PSA DIAGNOSTIC; Future          Follow Up       Return in about 6 months (around 10/3/2023) for PSA prior to visit.  Patient was given instructions and counseling regarding his condition or for health maintenance advice. Please see specific information pulled into the AVS if appropriate.     Transcribed from ambient dictation for Silvana Alonso MD by Tamara Pereira.  04/03/23   13:12 EDT    Patient or patient representative verbalized consent to the visit recording.  I have personally performed the services described in this document as transcribed by the above individual, and it is both accurate and complete.  Silvana Alonso MD  4/6/2023  19:32 EDT

## 2023-05-31 DIAGNOSIS — M48.061 SPINAL STENOSIS OF LUMBAR REGION, UNSPECIFIED WHETHER NEUROGENIC CLAUDICATION PRESENT: ICD-10-CM

## 2023-05-31 RX ORDER — LIDOCAINE 50 MG/G
1 PATCH TOPICAL EVERY 24 HOURS
Qty: 30 EACH | Refills: 10 | OUTPATIENT
Start: 2023-05-31

## 2023-06-12 ENCOUNTER — OFFICE VISIT (OUTPATIENT)
Dept: FAMILY MEDICINE CLINIC | Facility: CLINIC | Age: 68
End: 2023-06-12
Payer: MEDICARE

## 2023-06-12 VITALS
TEMPERATURE: 98 F | WEIGHT: 215.8 LBS | BODY MASS INDEX: 30.21 KG/M2 | HEART RATE: 66 BPM | DIASTOLIC BLOOD PRESSURE: 72 MMHG | OXYGEN SATURATION: 96 % | HEIGHT: 71 IN | SYSTOLIC BLOOD PRESSURE: 137 MMHG | RESPIRATION RATE: 12 BRPM

## 2023-06-12 DIAGNOSIS — M43.6 NECK STIFFNESS: Primary | ICD-10-CM

## 2023-06-12 DIAGNOSIS — T14.8XXA MUSCLE STRAIN: ICD-10-CM

## 2023-06-12 RX ORDER — TIZANIDINE 4 MG/1
4 TABLET ORAL NIGHTLY PRN
Qty: 20 TABLET | Refills: 0 | Status: SHIPPED | OUTPATIENT
Start: 2023-06-12

## 2023-06-12 RX ORDER — PREDNISONE 20 MG/1
TABLET ORAL
Qty: 18 TABLET | Refills: 0 | Status: SHIPPED | OUTPATIENT
Start: 2023-06-12

## 2023-06-12 NOTE — PROGRESS NOTES
Chief Complaint  Neck Pain (Neck pain and stiffness    3 weeks ) and Shoulder Pain (Left shoulder pain   x 3 weeks )    Subjective          Dion Espana presents to [facility]  History of Present Illness  He has been having neck stiffness for 3 weeks and it is going into the shoulder and into his left jaw.  He said he was driving the tractor looking over his right shoulder and his left side has been stiff and since then.  He thought it was his pillow and he changed his pillow but that did not help.  He has tried ice heat ibuprofen and some old Flexeril.  He said the Flexeril helped very little it did help him to sleep but not really do the muscle.  He has been using some essential oils of frankincense and that has helped more than the muscle relaxer.  No neurological deficits that he is aware of.      Allergies  Patient has no known allergies.    Social History     Tobacco Use    Smoking status: Never    Smokeless tobacco: Never   Vaping Use    Vaping Use: Never used   Substance Use Topics    Alcohol use: Not Currently    Drug use: Never       Family History   Problem Relation Age of Onset    Arthritis Mother     Stroke Mother     Heart disease Mother     Diabetes Mother     Heart disease Father     Stroke Brother     Colon cancer Neg Hx         Health Maintenance Due   Topic Date Due    COVID-19 Vaccine (6 - Pfizer series) 05/12/2023        Immunization History   Administered Date(s) Administered    COVID-19 (PFIZER) BIVALENT 12+YRS 01/12/2023    COVID-19 (PFIZER) Purple Cap Monovalent 03/15/2021, 04/05/2021, 11/29/2021    Covid-19 (Pfizer) Gray Cap Monovalent 08/24/2022    FluLaval/Fluzone >6mos 10/30/2019    Fluzone High-Dose 65+yrs 10/13/2022    Fluzone Quad >6mos (Multi-dose) 10/30/2019, 10/15/2020    Hepatitis A 07/31/2018, 03/14/2019    Pneumococcal Conjugate 13-Valent (PCV13) 01/25/2022    Pneumococcal Conjugate 20-Valent (PCV20) 02/02/2023    Shingrix 02/26/2018,  "08/10/2018       Review of Systems   Musculoskeletal:  Positive for arthralgias, myalgias, neck pain and neck stiffness.      Objective       Vitals:    06/12/23 1514   BP: 137/72   BP Location: Left arm   Patient Position: Sitting   Cuff Size: Adult   Pulse: 66   Resp: 12   Temp: 98 °F (36.7 °C)   SpO2: 96%   Weight: 97.9 kg (215 lb 12.8 oz)   Height: 180.3 cm (71\")       Body mass index is 30.1 kg/m².         Physical Exam  Vitals reviewed.   Constitutional:       Appearance: Normal appearance. He is well-developed.   HENT:      Head: Normocephalic.   Pulmonary:      Effort: Pulmonary effort is normal.      Breath sounds: Normal breath sounds.   Musculoskeletal:        Arms:         Back:       Comments: He has stiffness noted rotating his head to the left versus the right.  He is not able to do a full chin to chest bend.   Skin:     Findings: No bruising.   Neurological:      General: No focal deficit present.      Mental Status: He is alert and oriented to person, place, and time.      Cranial Nerves: No cranial nerve deficit.      Motor: No weakness.   Psychiatric:         Mood and Affect: Mood and affect normal.         Behavior: Behavior normal.         Thought Content: Thought content normal.         Judgment: Judgment normal.   He has full neurological ability in his face as we discussed about Bell's palsy which he does not have.        Result Review :     The following data was reviewed by: NICOLAS Davies on 06/12/2023:                     Assessment and Plan      Diagnoses and all orders for this visit:    1. Neck stiffness (Primary)  -     tiZANidine (Zanaflex) 4 MG tablet; Take 1 tablet by mouth At Night As Needed for Muscle Spasms.  Dispense: 20 tablet; Refill: 0  -     etodolac (LODINE) 300 MG capsule; Take 1 capsule by mouth Every 8 (Eight) Hours.  Dispense: 30 capsule; Refill: 0  -     predniSONE (DELTASONE) 20 MG tablet; Take 1 tablet by mouth 3 times a day for 3 days then 1 tablet " twice daily for 3 days then 1 tab daily for 3 days  Dispense: 18 tablet; Refill: 0    2. Muscle strain  -     tiZANidine (Zanaflex) 4 MG tablet; Take 1 tablet by mouth At Night As Needed for Muscle Spasms.  Dispense: 20 tablet; Refill: 0  -     etodolac (LODINE) 300 MG capsule; Take 1 capsule by mouth Every 8 (Eight) Hours.  Dispense: 30 capsule; Refill: 0  -     predniSONE (DELTASONE) 20 MG tablet; Take 1 tablet by mouth 3 times a day for 3 days then 1 tablet twice daily for 3 days then 1 tab daily for 3 days  Dispense: 18 tablet; Refill: 0            Follow Up     Return if symptoms worsen or fail to improve.  We will do the above medicine.  He will let me know Thursday if he is not any better we will go ahead and do x-rays of the neck and shoulder.  Call with questions or concerns.  Caution sedation with the muscle relaxer.  Take anti-inflammatory with food.  Caution side effects of prednisone.  Patient was given instructions and counseling regarding his condition or for health maintenance advice. Please see specific information pulled into the AVS if appropriate.     Parts of this note are electronic transcriptions/translations of spoken language to printed text using the Dragon Dictation system.          Kenia Mclean, APRN  06/12/2023

## 2023-07-26 ENCOUNTER — OFFICE VISIT (OUTPATIENT)
Dept: FAMILY MEDICINE CLINIC | Facility: CLINIC | Age: 68
End: 2023-07-26
Payer: MEDICARE

## 2023-07-26 VITALS
HEART RATE: 65 BPM | HEIGHT: 71 IN | WEIGHT: 215.5 LBS | OXYGEN SATURATION: 95 % | TEMPERATURE: 98.1 F | SYSTOLIC BLOOD PRESSURE: 125 MMHG | BODY MASS INDEX: 30.17 KG/M2 | RESPIRATION RATE: 14 BRPM | DIASTOLIC BLOOD PRESSURE: 65 MMHG

## 2023-07-26 DIAGNOSIS — N52.9 ERECTILE DYSFUNCTION, UNSPECIFIED ERECTILE DYSFUNCTION TYPE: ICD-10-CM

## 2023-07-26 DIAGNOSIS — M10.9 GOUT, UNSPECIFIED CAUSE, UNSPECIFIED CHRONICITY, UNSPECIFIED SITE: ICD-10-CM

## 2023-07-26 DIAGNOSIS — M43.6 NECK STIFFNESS: ICD-10-CM

## 2023-07-26 DIAGNOSIS — I10 BENIGN ESSENTIAL HYPERTENSION: ICD-10-CM

## 2023-07-26 DIAGNOSIS — E78.00 PURE HYPERCHOLESTEROLEMIA: ICD-10-CM

## 2023-07-26 DIAGNOSIS — T78.40XD ALLERGY, SUBSEQUENT ENCOUNTER: ICD-10-CM

## 2023-07-26 DIAGNOSIS — T14.8XXA MUSCLE STRAIN: ICD-10-CM

## 2023-07-26 RX ORDER — AMLODIPINE BESYLATE AND BENAZEPRIL HYDROCHLORIDE 5; 20 MG/1; MG/1
1 CAPSULE ORAL DAILY
Qty: 90 CAPSULE | Refills: 1 | Status: SHIPPED | OUTPATIENT
Start: 2023-07-26

## 2023-07-26 RX ORDER — SILDENAFIL 100 MG/1
100 TABLET, FILM COATED ORAL DAILY PRN
Qty: 30 TABLET | Refills: 3 | Status: SHIPPED | OUTPATIENT
Start: 2023-07-26

## 2023-07-26 RX ORDER — ALLOPURINOL 300 MG/1
300 TABLET ORAL DAILY
Qty: 90 TABLET | Refills: 1 | Status: SHIPPED | OUTPATIENT
Start: 2023-07-26

## 2023-07-26 RX ORDER — MONTELUKAST SODIUM 10 MG/1
10 TABLET ORAL NIGHTLY
Qty: 90 TABLET | Refills: 1 | Status: SHIPPED | OUTPATIENT
Start: 2023-07-26

## 2023-07-26 RX ORDER — METOPROLOL SUCCINATE 100 MG/1
100 TABLET, EXTENDED RELEASE ORAL DAILY
Qty: 90 TABLET | Refills: 1 | Status: SHIPPED | OUTPATIENT
Start: 2023-07-26

## 2023-07-26 RX ORDER — ATORVASTATIN CALCIUM 20 MG/1
20 TABLET, FILM COATED ORAL NIGHTLY
Qty: 90 TABLET | Refills: 1 | Status: SHIPPED | OUTPATIENT
Start: 2023-07-26

## 2023-07-27 ENCOUNTER — LAB (OUTPATIENT)
Dept: FAMILY MEDICINE CLINIC | Facility: CLINIC | Age: 68
End: 2023-07-27
Payer: MEDICARE

## 2023-07-27 DIAGNOSIS — M10.9 GOUT, UNSPECIFIED CAUSE, UNSPECIFIED CHRONICITY, UNSPECIFIED SITE: ICD-10-CM

## 2023-07-27 DIAGNOSIS — I10 BENIGN ESSENTIAL HYPERTENSION: ICD-10-CM

## 2023-07-27 DIAGNOSIS — E78.00 PURE HYPERCHOLESTEROLEMIA: ICD-10-CM

## 2023-07-27 LAB
ALBUMIN SERPL-MCNC: 4.4 G/DL (ref 3.5–5.2)
ALBUMIN/GLOB SERPL: 1.6 G/DL
ALP SERPL-CCNC: 111 U/L (ref 39–117)
ALT SERPL W P-5'-P-CCNC: 17 U/L (ref 1–41)
ANION GAP SERPL CALCULATED.3IONS-SCNC: 10 MMOL/L (ref 5–15)
AST SERPL-CCNC: 18 U/L (ref 1–40)
BASOPHILS # BLD AUTO: 0.06 10*3/MM3 (ref 0–0.2)
BASOPHILS NFR BLD AUTO: 0.9 % (ref 0–1.5)
BILIRUB SERPL-MCNC: 0.6 MG/DL (ref 0–1.2)
BUN SERPL-MCNC: 16 MG/DL (ref 8–23)
BUN/CREAT SERPL: 15.2 (ref 7–25)
CALCIUM SPEC-SCNC: 9.3 MG/DL (ref 8.6–10.5)
CHLORIDE SERPL-SCNC: 106 MMOL/L (ref 98–107)
CHOLEST SERPL-MCNC: 117 MG/DL (ref 0–200)
CO2 SERPL-SCNC: 27 MMOL/L (ref 22–29)
CREAT SERPL-MCNC: 1.05 MG/DL (ref 0.76–1.27)
DEPRECATED RDW RBC AUTO: 42.9 FL (ref 37–54)
EGFRCR SERPLBLD CKD-EPI 2021: 77.8 ML/MIN/1.73
EOSINOPHIL # BLD AUTO: 0.21 10*3/MM3 (ref 0–0.4)
EOSINOPHIL NFR BLD AUTO: 3.1 % (ref 0.3–6.2)
ERYTHROCYTE [DISTWIDTH] IN BLOOD BY AUTOMATED COUNT: 13.6 % (ref 12.3–15.4)
GLOBULIN UR ELPH-MCNC: 2.7 GM/DL
GLUCOSE SERPL-MCNC: 85 MG/DL (ref 65–99)
HCT VFR BLD AUTO: 41.2 % (ref 37.5–51)
HDLC SERPL-MCNC: 27 MG/DL (ref 40–60)
HGB BLD-MCNC: 14 G/DL (ref 13–17.7)
IMM GRANULOCYTES # BLD AUTO: 0.07 10*3/MM3 (ref 0–0.05)
IMM GRANULOCYTES NFR BLD AUTO: 1 % (ref 0–0.5)
LDLC SERPL CALC-MCNC: 49 MG/DL (ref 0–100)
LDLC/HDLC SERPL: 1.38 {RATIO}
LYMPHOCYTES # BLD AUTO: 1.84 10*3/MM3 (ref 0.7–3.1)
LYMPHOCYTES NFR BLD AUTO: 27 % (ref 19.6–45.3)
MCH RBC QN AUTO: 29.9 PG (ref 26.6–33)
MCHC RBC AUTO-ENTMCNC: 34 G/DL (ref 31.5–35.7)
MCV RBC AUTO: 88 FL (ref 79–97)
MONOCYTES # BLD AUTO: 1 10*3/MM3 (ref 0.1–0.9)
MONOCYTES NFR BLD AUTO: 14.7 % (ref 5–12)
NEUTROPHILS NFR BLD AUTO: 3.63 10*3/MM3 (ref 1.7–7)
NEUTROPHILS NFR BLD AUTO: 53.3 % (ref 42.7–76)
NRBC BLD AUTO-RTO: 0 /100 WBC (ref 0–0.2)
PLATELET # BLD AUTO: 265 10*3/MM3 (ref 140–450)
PMV BLD AUTO: 9.8 FL (ref 6–12)
POTASSIUM SERPL-SCNC: 3.9 MMOL/L (ref 3.5–5.2)
PROT SERPL-MCNC: 7.1 G/DL (ref 6–8.5)
RBC # BLD AUTO: 4.68 10*6/MM3 (ref 4.14–5.8)
SODIUM SERPL-SCNC: 143 MMOL/L (ref 136–145)
TRIGL SERPL-MCNC: 264 MG/DL (ref 0–150)
TSH SERPL DL<=0.05 MIU/L-ACNC: 1.82 UIU/ML (ref 0.27–4.2)
URATE SERPL-MCNC: 5.2 MG/DL (ref 3.4–7)
VLDLC SERPL-MCNC: 41 MG/DL (ref 5–40)
WBC NRBC COR # BLD: 6.81 10*3/MM3 (ref 3.4–10.8)

## 2023-07-27 PROCEDURE — 84550 ASSAY OF BLOOD/URIC ACID: CPT | Performed by: NURSE PRACTITIONER

## 2023-07-27 PROCEDURE — 84443 ASSAY THYROID STIM HORMONE: CPT | Performed by: NURSE PRACTITIONER

## 2023-07-27 PROCEDURE — 80061 LIPID PANEL: CPT | Performed by: NURSE PRACTITIONER

## 2023-07-27 PROCEDURE — 80053 COMPREHEN METABOLIC PANEL: CPT | Performed by: NURSE PRACTITIONER

## 2023-07-27 PROCEDURE — 85025 COMPLETE CBC W/AUTO DIFF WBC: CPT | Performed by: NURSE PRACTITIONER

## 2023-08-02 DIAGNOSIS — M10.9 GOUT, UNSPECIFIED CAUSE, UNSPECIFIED CHRONICITY, UNSPECIFIED SITE: ICD-10-CM

## 2023-08-02 DIAGNOSIS — T14.8XXA MUSCLE STRAIN: ICD-10-CM

## 2023-08-02 DIAGNOSIS — N52.9 ERECTILE DYSFUNCTION, UNSPECIFIED ERECTILE DYSFUNCTION TYPE: ICD-10-CM

## 2023-08-02 DIAGNOSIS — E78.00 PURE HYPERCHOLESTEROLEMIA: ICD-10-CM

## 2023-08-02 DIAGNOSIS — M43.6 NECK STIFFNESS: ICD-10-CM

## 2023-08-02 DIAGNOSIS — I10 BENIGN ESSENTIAL HYPERTENSION: ICD-10-CM

## 2023-08-02 DIAGNOSIS — T78.40XD ALLERGY, SUBSEQUENT ENCOUNTER: ICD-10-CM

## 2023-08-02 RX ORDER — ALLOPURINOL 300 MG/1
300 TABLET ORAL DAILY
Qty: 90 TABLET | Refills: 1 | Status: SHIPPED | OUTPATIENT
Start: 2023-08-02

## 2023-08-02 RX ORDER — MONTELUKAST SODIUM 10 MG/1
10 TABLET ORAL NIGHTLY
Qty: 90 TABLET | Refills: 1 | Status: SHIPPED | OUTPATIENT
Start: 2023-08-02

## 2023-08-02 RX ORDER — METOPROLOL SUCCINATE 100 MG/1
100 TABLET, EXTENDED RELEASE ORAL DAILY
Qty: 90 TABLET | Refills: 1 | Status: SHIPPED | OUTPATIENT
Start: 2023-08-02

## 2023-08-02 RX ORDER — AMLODIPINE BESYLATE AND BENAZEPRIL HYDROCHLORIDE 5; 20 MG/1; MG/1
1 CAPSULE ORAL DAILY
Qty: 90 CAPSULE | Refills: 1 | Status: SHIPPED | OUTPATIENT
Start: 2023-08-02

## 2023-08-02 RX ORDER — SILDENAFIL 100 MG/1
100 TABLET, FILM COATED ORAL DAILY PRN
Qty: 30 TABLET | Refills: 3 | Status: SHIPPED | OUTPATIENT
Start: 2023-08-02

## 2023-08-02 RX ORDER — ATORVASTATIN CALCIUM 20 MG/1
20 TABLET, FILM COATED ORAL NIGHTLY
Qty: 90 TABLET | Refills: 1 | Status: SHIPPED | OUTPATIENT
Start: 2023-08-02

## 2023-10-16 ENCOUNTER — LAB (OUTPATIENT)
Dept: LAB | Facility: HOSPITAL | Age: 68
End: 2023-10-16
Payer: MEDICARE

## 2023-10-16 DIAGNOSIS — Z85.46 HISTORY OF PROSTATE CANCER: ICD-10-CM

## 2023-10-16 LAB — PSA SERPL-MCNC: 0.17 NG/ML (ref 0–4)

## 2023-10-16 PROCEDURE — 84153 ASSAY OF PSA TOTAL: CPT

## 2023-10-16 PROCEDURE — 36415 COLL VENOUS BLD VENIPUNCTURE: CPT

## 2023-10-20 ENCOUNTER — OFFICE VISIT (OUTPATIENT)
Dept: UROLOGY | Facility: CLINIC | Age: 68
End: 2023-10-20
Payer: MEDICARE

## 2023-10-20 VITALS
SYSTOLIC BLOOD PRESSURE: 151 MMHG | BODY MASS INDEX: 29.68 KG/M2 | DIASTOLIC BLOOD PRESSURE: 86 MMHG | WEIGHT: 212 LBS | HEIGHT: 71 IN

## 2023-10-20 DIAGNOSIS — Z85.46 HISTORY OF PROSTATE CANCER: Primary | ICD-10-CM

## 2023-10-20 LAB
BILIRUB BLD-MCNC: NEGATIVE MG/DL
CLARITY, POC: CLEAR
COLOR UR: YELLOW
EXPIRATION DATE: ABNORMAL
GLUCOSE UR STRIP-MCNC: NEGATIVE MG/DL
KETONES UR QL: NEGATIVE
LEUKOCYTE EST, POC: NEGATIVE
Lab: ABNORMAL
NITRITE UR-MCNC: NEGATIVE MG/ML
PH UR: 6 [PH] (ref 5–8)
PROT UR STRIP-MCNC: NEGATIVE MG/DL
RBC # UR STRIP: ABNORMAL /UL
SP GR UR: 1.02 (ref 1–1.03)
UROBILINOGEN UR QL: ABNORMAL

## 2023-10-20 NOTE — PROGRESS NOTES
"Chief Complaint  History of prostate cancer    Subjective          Dion Espana presents to [facility]    History of Present Illness  The patient presents today for a follow-up.     The patient is doing well. His PSA is slowly rising, but he has not reached the level of biochemical recurrence at this point. He reports that he has under gone a prostatectomy in 2016.       History of Present Illness  67 yo male s/p RALP with bilateral PLND for intermediate risk prostate cancer.   Surgery in Sept 2016  PSA 4.7  Biopsy - 3+4 L mid and 3+3 R base  cT1c     Final Path - Surgery in Sept.   [place] 3+4 ([facility])  [id number]  margins were negative     His PSA is slowly rising.   1 year ago it was 0.165 and then was 0.180 6 months ago.  It is now, in October 2023, 0.170.     He has no other complaints.       Objective   Vital Signs:   /86   Ht 180.3 cm (71\")   Wt 96.2 kg (212 lb)   BMI 29.57 kg/m²       Physical Exam  Vitals and nursing note reviewed.   Constitutional:       Appearance: Normal appearance. He is well-developed.   Pulmonary:      Effort: Pulmonary effort is normal.      Breath sounds: Normal air entry.   Neurological:      Mental Status: He is alert and oriented to person, place, and time.      Motor: Motor function is intact.   Psychiatric:         Mood and Affect: Mood normal.         Behavior: Behavior normal.          Result Review :   The following data was reviewed by: Silvana Alonso MD on 10/20/2023:    Results for orders placed or performed in visit on 10/20/23   POC Urinalysis Dipstick, Automated    Specimen: Urine   Result Value Ref Range    Color Yellow Yellow, Straw, Dark Yellow, Marce    Clarity, UA Clear Clear    Specific Gravity  1.025 1.005 - 1.030    pH, Urine 6.0 5.0 - 8.0    Leukocytes Negative Negative    Nitrite, UA Negative Negative    Protein, POC Negative Negative mg/dL    Glucose, UA Negative Negative mg/dL    Ketones, UA Negative " Negative    Urobilinogen, UA 0.2 E.U./dL Normal, 0.2 E.U./dL    Bilirubin Negative Negative    Blood, UA Trace (A) Negative    Lot Number [id number]     Expiration Date 92,024        PSA          3/22/2023    08:38 10/16/2023    09:15   PSA   PSA 0.180  0.170               Assessment and Plan    Diagnoses and all orders for this visit:    1. History of prostate cancer (Primary)  -     POC Urinalysis Dipstick, Automated  -     PSA DIAGNOSTIC; Future    Patient's PSA remains stable at 0.170.  It is actually down a little bit from his last visit.  We will recheck again in 6 months.  I will see him at that time.  He will call sooner if any other issues.        Follow Up       No follow-ups on file.  Patient was given instructions and counseling regarding his condition or for health maintenance advice. Please see specific information pulled into the AVS if appropriate.

## 2023-11-21 ENCOUNTER — FLU SHOT (OUTPATIENT)
Dept: FAMILY MEDICINE CLINIC | Facility: CLINIC | Age: 68
End: 2023-11-21
Payer: MEDICARE

## 2023-11-21 DIAGNOSIS — Z23 NEED FOR INFLUENZA VACCINATION: Primary | ICD-10-CM

## 2023-11-21 PROCEDURE — G0008 ADMIN INFLUENZA VIRUS VAC: HCPCS | Performed by: NURSE PRACTITIONER

## 2023-11-21 PROCEDURE — 90662 IIV NO PRSV INCREASED AG IM: CPT | Performed by: NURSE PRACTITIONER

## 2023-11-21 NOTE — PROGRESS NOTES
Flu vaccine was administered into the right deltoid, patient handled well. After injection patient waited 15 mins with no reaction.

## 2024-01-17 ENCOUNTER — OFFICE VISIT (OUTPATIENT)
Dept: FAMILY MEDICINE CLINIC | Facility: CLINIC | Age: 69
End: 2024-01-17
Payer: MEDICARE

## 2024-01-17 VITALS
DIASTOLIC BLOOD PRESSURE: 70 MMHG | BODY MASS INDEX: 30.81 KG/M2 | HEIGHT: 71 IN | HEART RATE: 84 BPM | SYSTOLIC BLOOD PRESSURE: 140 MMHG | OXYGEN SATURATION: 97 % | RESPIRATION RATE: 16 BRPM | WEIGHT: 220.1 LBS | TEMPERATURE: 96.8 F

## 2024-01-17 DIAGNOSIS — U07.1 COVID: Primary | ICD-10-CM

## 2024-01-17 DIAGNOSIS — R05.1 ACUTE COUGH: ICD-10-CM

## 2024-01-17 LAB
EXPIRATION DATE: ABNORMAL
FLUAV AG UPPER RESP QL IA.RAPID: NOT DETECTED
FLUBV AG UPPER RESP QL IA.RAPID: NOT DETECTED
INTERNAL CONTROL: ABNORMAL
Lab: ABNORMAL
SARS-COV-2 AG UPPER RESP QL IA.RAPID: DETECTED

## 2024-01-17 RX ORDER — METHYLPREDNISOLONE ACETATE 40 MG/ML
40 INJECTION, SUSPENSION INTRA-ARTICULAR; INTRALESIONAL; INTRAMUSCULAR; SOFT TISSUE ONCE
Status: COMPLETED | OUTPATIENT
Start: 2024-01-17 | End: 2024-01-17

## 2024-01-17 RX ORDER — DEXTROMETHORPHAN HYDROBROMIDE AND PROMETHAZINE HYDROCHLORIDE 15; 6.25 MG/5ML; MG/5ML
5 SYRUP ORAL NIGHTLY PRN
Qty: 120 ML | Refills: 1 | Status: SHIPPED | OUTPATIENT
Start: 2024-01-17

## 2024-01-17 RX ADMIN — METHYLPREDNISOLONE ACETATE 40 MG: 40 INJECTION, SUSPENSION INTRA-ARTICULAR; INTRALESIONAL; INTRAMUSCULAR; SOFT TISSUE at 16:18

## 2024-01-18 NOTE — PROGRESS NOTES
Chief Complaint  Cough (States been going for 4 days), Sinusitis, and Headache    Subjective          Dion Espana presents to [facility]  History of Present Illness  He is here for sinus congestion.  He said that his wife has been sick about a week to 2 weeks ago and he started roughly 4 days ago.  He has had sore throat mainly on the left side.  He has had scratchiness in his throat.  He has had sinus pressure.  He said he had headache in his temporal area for the last day or 2 on and off.  He feels that he cannot quite get a full deep breath.  He had COVID several years ago right before surgery.  He has had no nausea vomiting diarrhea.  He has had no fever.  He has been taking [place] day and night mainly at bedtime.  He has not taken a COVID test at home.    Depression: Not at risk (1/17/2024)    PHQ-2     PHQ-2 Score: 0    and 1/17/2024               Allergies  Patient has no known allergies.    Social History     Tobacco Use    Smoking status: Never    Smokeless tobacco: Never   Vaping Use    Vaping Use: Never used   Substance Use Topics    Alcohol use: Not Currently    Drug use: Never       Family History   Problem Relation Age of Onset    Arthritis Mother     Stroke Mother     Heart disease Mother     Diabetes Mother     Heart disease Father     Stroke Brother     Colon cancer Neg Hx         There are no preventive care reminders to display for this patient.     Immunization History   Administered Date(s) Administered    COVID-19 (PFIZER) BIVALENT 12+YRS 01/12/2023    COVID-19 (PFIZER) Purple Cap Monovalent 03/15/2021, 04/05/2021, 11/29/2021    COVID-19 (UNSPECIFIED) 01/08/2024    Covid-19 (Pfizer) Gray Cap Monovalent 08/24/2022    Fluzone (or Fluarix & Flulaval for VFC) >6mos 10/30/2019    Fluzone High-Dose 65+yrs 10/13/2022, 11/21/2023    Fluzone Quad >6mos (Multi-dose) 10/30/2019, 10/15/2020    Hepatitis A 07/31/2018, 03/14/2019    Pneumococcal Conjugate  "13-Valent (PCV13) 01/25/2022    Pneumococcal Conjugate 20-Valent (PCV20) 02/02/2023    RSV, unspecified 01/08/2024    Shingrix 02/26/2018, 08/10/2018       Review of Systems   Constitutional:  Negative for chills and fever.   HENT:  Positive for congestion, postnasal drip, rhinorrhea, sinus pressure and sore throat.    Respiratory:  Positive for cough. Negative for shortness of breath.    Gastrointestinal:  Negative for diarrhea, nausea and vomiting.   Skin:  Negative for rash.        Objective       Vitals:    01/17/24 1534   BP: 140/70   Pulse: 84   Resp: 16   Temp: 96.8 °F (36 °C)   SpO2: 97%   Weight: 99.8 kg (220 lb 1.6 oz)   Height: 180.3 cm (71\")       Body mass index is 30.7 kg/m².         Physical Exam  Vitals reviewed.   Constitutional:       Appearance: Normal appearance. He is well-developed.   HENT:      Right Ear: Tympanic membrane and ear canal normal. There is no impacted cerumen.      Left Ear: Tympanic membrane and ear canal normal. There is no impacted cerumen.      Nose: Congestion and rhinorrhea present.      Mouth/Throat:      Pharynx: Posterior oropharyngeal erythema present. No oropharyngeal exudate.   Eyes:      General: No scleral icterus.        Right eye: No discharge.         Left eye: No discharge.      Conjunctiva/sclera: Conjunctivae normal.   Cardiovascular:      Rate and Rhythm: Normal rate and regular rhythm.      Heart sounds: Normal heart sounds. No murmur heard.  Pulmonary:      Effort: Pulmonary effort is normal.      Breath sounds: Normal breath sounds.   Neurological:      Mental Status: He is alert and oriented to person, place, and time.      Cranial Nerves: No cranial nerve deficit.      Motor: No weakness.   Psychiatric:         Mood and Affect: Mood and affect normal.             Result Review :     The following data was reviewed by: NICOLAS Davies on 01/17/2024:    POC COVID/FLU   Lab Results   Component Value Date    SARSANTIGEN Detected (A) 01/17/2024    " FLUAAG Not Detected 01/17/2024    FLUBAG Not Detected 01/17/2024    INTCT Passed 01/17/2024    LOTNUMBER [id number] 01/17/2024    EXPIRATDTE 12/22/2024 01/17/2024                     Assessment and Plan      Diagnoses and all orders for this visit:    1. COVID (Primary)  -     Nirmatrelvir & Ritonavir, 300mg/100mg, (PAXLOVID) 20 x 150 MG & 10 x 100MG tablet therapy pack tablet; Take 3 tablets by mouth 2 (Two) Times a Day for 5 days.  Dispense: 30 tablet; Refill: 0    2. Acute cough  -     POCT SARS-CoV-2 Antigen MARGIE + Flu  -     promethazine-dextromethorphan (PROMETHAZINE-DM) 6.25-15 MG/5ML syrup; Take 5 mL by mouth At Night As Needed for Cough.  Dispense: 120 mL; Refill: 1  -     methylPREDNISolone acetate (DEPO-medrol) injection 40 mg            Follow Up     Return if symptoms worsen or fail to improve.  He is positive for COVID.  We will do Paxlovid.  We will also do the steroid shot.  As he request a shot over pills.  We will do the cough medicine.  Discussed that there is potential that he can get a sinus infection after this and he will keep me posted.  Call with questions or concerns.  Discussed about quarantining.  Patient was given instructions and counseling regarding his condition or for health maintenance advice. Please see specific information pulled into the AVS if appropriate.     Parts of this note are electronic transcriptions/translations of spoken language to printed text using the Dragon Dictation system.          Kenia Mclean, APRN  01/17/2024

## 2024-01-29 ENCOUNTER — OFFICE VISIT (OUTPATIENT)
Dept: FAMILY MEDICINE CLINIC | Facility: CLINIC | Age: 69
End: 2024-01-29
Payer: MEDICARE

## 2024-01-29 VITALS
SYSTOLIC BLOOD PRESSURE: 130 MMHG | DIASTOLIC BLOOD PRESSURE: 78 MMHG | HEIGHT: 71 IN | BODY MASS INDEX: 29.68 KG/M2 | HEART RATE: 51 BPM | WEIGHT: 212 LBS | TEMPERATURE: 97.5 F | RESPIRATION RATE: 20 BRPM | OXYGEN SATURATION: 98 %

## 2024-01-29 DIAGNOSIS — T14.8XXA MUSCLE STRAIN: ICD-10-CM

## 2024-01-29 DIAGNOSIS — I10 BENIGN ESSENTIAL HYPERTENSION: ICD-10-CM

## 2024-01-29 DIAGNOSIS — E78.00 PURE HYPERCHOLESTEROLEMIA: ICD-10-CM

## 2024-01-29 DIAGNOSIS — N52.9 ERECTILE DYSFUNCTION, UNSPECIFIED ERECTILE DYSFUNCTION TYPE: ICD-10-CM

## 2024-01-29 DIAGNOSIS — M43.6 NECK STIFFNESS: ICD-10-CM

## 2024-01-29 DIAGNOSIS — Z00.00 MEDICARE ANNUAL WELLNESS VISIT, SUBSEQUENT: Primary | ICD-10-CM

## 2024-01-29 DIAGNOSIS — M10.9 GOUT, UNSPECIFIED CAUSE, UNSPECIFIED CHRONICITY, UNSPECIFIED SITE: ICD-10-CM

## 2024-01-29 DIAGNOSIS — T78.40XD ALLERGY, SUBSEQUENT ENCOUNTER: ICD-10-CM

## 2024-01-29 LAB
ALBUMIN SERPL-MCNC: 4.4 G/DL (ref 3.5–5.2)
ALBUMIN/GLOB SERPL: 1.6 G/DL
ALP SERPL-CCNC: 119 U/L (ref 39–117)
ALT SERPL W P-5'-P-CCNC: 23 U/L (ref 1–41)
ANION GAP SERPL CALCULATED.3IONS-SCNC: 11 MMOL/L (ref 5–15)
AST SERPL-CCNC: 19 U/L (ref 1–40)
BASOPHILS # BLD AUTO: 0.05 10*3/MM3 (ref 0–0.2)
BASOPHILS NFR BLD AUTO: 0.7 % (ref 0–1.5)
BILIRUB SERPL-MCNC: 0.5 MG/DL (ref 0–1.2)
BUN SERPL-MCNC: 16 MG/DL (ref 8–23)
BUN/CREAT SERPL: 15.8 (ref 7–25)
CALCIUM SPEC-SCNC: 9.3 MG/DL (ref 8.6–10.5)
CHLORIDE SERPL-SCNC: 102 MMOL/L (ref 98–107)
CHOLEST SERPL-MCNC: 193 MG/DL (ref 0–200)
CO2 SERPL-SCNC: 27 MMOL/L (ref 22–29)
CREAT SERPL-MCNC: 1.01 MG/DL (ref 0.76–1.27)
DEPRECATED RDW RBC AUTO: 42.3 FL (ref 37–54)
EGFRCR SERPLBLD CKD-EPI 2021: 81.5 ML/MIN/1.73
EOSINOPHIL # BLD AUTO: 0.18 10*3/MM3 (ref 0–0.4)
EOSINOPHIL NFR BLD AUTO: 2.4 % (ref 0.3–6.2)
ERYTHROCYTE [DISTWIDTH] IN BLOOD BY AUTOMATED COUNT: 13.3 % (ref 12.3–15.4)
GLOBULIN UR ELPH-MCNC: 2.7 GM/DL
GLUCOSE SERPL-MCNC: 83 MG/DL (ref 65–99)
HCT VFR BLD AUTO: 42.7 % (ref 37.5–51)
HDLC SERPL-MCNC: 41 MG/DL (ref 40–60)
HGB BLD-MCNC: 14.5 G/DL (ref 13–17.7)
IMM GRANULOCYTES # BLD AUTO: 0.07 10*3/MM3 (ref 0–0.05)
IMM GRANULOCYTES NFR BLD AUTO: 0.9 % (ref 0–0.5)
LDLC SERPL CALC-MCNC: 100 MG/DL (ref 0–100)
LDLC/HDLC SERPL: 2.21 {RATIO}
LYMPHOCYTES # BLD AUTO: 2.06 10*3/MM3 (ref 0.7–3.1)
LYMPHOCYTES NFR BLD AUTO: 27.4 % (ref 19.6–45.3)
MCH RBC QN AUTO: 29.7 PG (ref 26.6–33)
MCHC RBC AUTO-ENTMCNC: 34 G/DL (ref 31.5–35.7)
MCV RBC AUTO: 87.5 FL (ref 79–97)
MONOCYTES # BLD AUTO: 0.8 10*3/MM3 (ref 0.1–0.9)
MONOCYTES NFR BLD AUTO: 10.6 % (ref 5–12)
NEUTROPHILS NFR BLD AUTO: 4.37 10*3/MM3 (ref 1.7–7)
NEUTROPHILS NFR BLD AUTO: 58 % (ref 42.7–76)
NRBC BLD AUTO-RTO: 0 /100 WBC (ref 0–0.2)
PLATELET # BLD AUTO: 302 10*3/MM3 (ref 140–450)
PMV BLD AUTO: 9.2 FL (ref 6–12)
POTASSIUM SERPL-SCNC: 4.1 MMOL/L (ref 3.5–5.2)
PROT SERPL-MCNC: 7.1 G/DL (ref 6–8.5)
RBC # BLD AUTO: 4.88 10*6/MM3 (ref 4.14–5.8)
SODIUM SERPL-SCNC: 140 MMOL/L (ref 136–145)
TRIGL SERPL-MCNC: 307 MG/DL (ref 0–150)
TSH SERPL DL<=0.05 MIU/L-ACNC: 4.43 UIU/ML (ref 0.27–4.2)
URATE SERPL-MCNC: 4.5 MG/DL (ref 3.4–7)
VLDLC SERPL-MCNC: 52 MG/DL (ref 5–40)
WBC NRBC COR # BLD AUTO: 7.53 10*3/MM3 (ref 3.4–10.8)

## 2024-01-29 PROCEDURE — 85025 COMPLETE CBC W/AUTO DIFF WBC: CPT | Performed by: NURSE PRACTITIONER

## 2024-01-29 PROCEDURE — 1159F MED LIST DOCD IN RCRD: CPT | Performed by: NURSE PRACTITIONER

## 2024-01-29 PROCEDURE — 1160F RVW MEDS BY RX/DR IN RCRD: CPT | Performed by: NURSE PRACTITIONER

## 2024-01-29 PROCEDURE — 99214 OFFICE O/P EST MOD 30 MIN: CPT | Performed by: NURSE PRACTITIONER

## 2024-01-29 PROCEDURE — 1170F FXNL STATUS ASSESSED: CPT | Performed by: NURSE PRACTITIONER

## 2024-01-29 PROCEDURE — 84550 ASSAY OF BLOOD/URIC ACID: CPT | Performed by: NURSE PRACTITIONER

## 2024-01-29 PROCEDURE — 80053 COMPREHEN METABOLIC PANEL: CPT | Performed by: NURSE PRACTITIONER

## 2024-01-29 PROCEDURE — 84443 ASSAY THYROID STIM HORMONE: CPT | Performed by: NURSE PRACTITIONER

## 2024-01-29 PROCEDURE — 3075F SYST BP GE 130 - 139MM HG: CPT | Performed by: NURSE PRACTITIONER

## 2024-01-29 PROCEDURE — 3078F DIAST BP <80 MM HG: CPT | Performed by: NURSE PRACTITIONER

## 2024-01-29 PROCEDURE — G0439 PPPS, SUBSEQ VISIT: HCPCS | Performed by: NURSE PRACTITIONER

## 2024-01-29 PROCEDURE — 80061 LIPID PANEL: CPT | Performed by: NURSE PRACTITIONER

## 2024-01-29 RX ORDER — SILDENAFIL 100 MG/1
100 TABLET, FILM COATED ORAL DAILY PRN
Qty: 30 TABLET | Refills: 3 | Status: SHIPPED | OUTPATIENT
Start: 2024-01-29

## 2024-01-29 RX ORDER — MONTELUKAST SODIUM 10 MG/1
10 TABLET ORAL NIGHTLY
Qty: 90 TABLET | Refills: 1 | Status: SHIPPED | OUTPATIENT
Start: 2024-01-29

## 2024-01-29 RX ORDER — METOPROLOL SUCCINATE 100 MG/1
100 TABLET, EXTENDED RELEASE ORAL DAILY
Qty: 90 TABLET | Refills: 1 | Status: SHIPPED | OUTPATIENT
Start: 2024-01-29

## 2024-01-29 RX ORDER — ATORVASTATIN CALCIUM 20 MG/1
20 TABLET, FILM COATED ORAL NIGHTLY
Qty: 90 TABLET | Refills: 1 | Status: SHIPPED | OUTPATIENT
Start: 2024-01-29

## 2024-01-29 RX ORDER — ALLOPURINOL 300 MG/1
300 TABLET ORAL DAILY
Qty: 90 TABLET | Refills: 1 | Status: SHIPPED | OUTPATIENT
Start: 2024-01-29

## 2024-01-29 RX ORDER — AMLODIPINE BESYLATE AND BENAZEPRIL HYDROCHLORIDE 5; 20 MG/1; MG/1
1 CAPSULE ORAL DAILY
Qty: 90 CAPSULE | Refills: 1 | Status: SHIPPED | OUTPATIENT
Start: 2024-01-29

## 2024-01-29 NOTE — PROGRESS NOTES
The ABCs of the Annual Wellness Visit  Subsequent Medicare Wellness Visit    Subjective    Dion Espana is a 68 y.o. male who presents for a Subsequent Medicare Wellness Visit.    The following portions of the patient's history were reviewed and   updated as appropriate: allergies, current medications, past family history, past medical history, past social history, past surgical history, and problem list.    Compared to one year ago, the patient feels his physical   health is the same.    Compared to one year ago, the patient feels his mental   health is the same.    Recent Hospitalizations:  He was not admitted to the hospital during the last year.       Current Medical Providers:  Patient Care Team:  Kenia Mclean APRN as PCP - General (Nurse Practitioner)  Silvana Alonso MD as Consulting Physician (Urology)    Outpatient Medications Prior to Visit   Medication Sig Dispense Refill    Krill Oil (Omega-3) 500 MG capsule krill oil 500 mg oral capsule take 1 capsule by oral route daily   Active      allopurinol (ZYLOPRIM) 300 MG tablet Take 1 tablet by mouth Daily. 90 tablet 1    amLODIPine-benazepril (LOTREL 5-20) 5-20 MG per capsule Take 1 capsule by mouth Daily. 90 capsule 1    atorvastatin (LIPITOR) 20 MG tablet Take 1 tablet by mouth Every Night. 90 tablet 1    etodolac (LODINE) 300 MG capsule Take 1 capsule by mouth Daily. 90 capsule 1    metoprolol succinate XL (TOPROL-XL) 100 MG 24 hr tablet Take 1 tablet by mouth Daily. 90 tablet 1    montelukast (Singulair) 10 MG tablet Take 1 tablet by mouth Every Night. 90 tablet 1    sildenafil (Viagra) 100 MG tablet Take 1 tablet by mouth Daily As Needed for Erectile Dysfunction. 30 tablet 3    lidocaine (LIDODERM) 5 % Place 1 patch on the skin as directed by provider Daily. Remove & Discard patch within 12 hours or as directed by MD (Patient not taking: Reported on 1/17/2024) 30 each 10    promethazine-dextromethorphan (PROMETHAZINE-DM) 6.25-15 MG/5ML  "syrup Take 5 mL by mouth At Night As Needed for Cough. (Patient not taking: Reported on 1/29/2024) 120 mL 1    tiZANidine (Zanaflex) 4 MG tablet Take 1 tablet by mouth At Night As Needed for Muscle Spasms. (Patient not taking: Reported on 1/17/2024) 20 tablet 0     No facility-administered medications prior to visit.       No opioid medication identified on active medication list. I have reviewed chart for other potential  high risk medication/s and harmful drug interactions in the elderly.        Aspirin is not on active medication list.  Aspirin use is not indicated based on review of current medical condition/s. Risk of harm outweighs potential benefits.  .    Patient Active Problem List   Diagnosis    Arthritis    Benign essential hypertension    Bone lesion    Displacement of lumbar intervertebral disc without myelopathy    Diverticulitis    Gout    Hearing loss    Hemorrhoids    History of prostate cancer    Hyperlipidemia    Lumbar radiculopathy    Lumbar spinal stenosis    Lumbar spondylosis    Paresthesia    Renal insufficiency    Sciatica    Sciatica    Tinnitus     Advance Care Planning   Advance Care Planning     Advance Directive is not on file.  ACP discussion was declined by the patient. Patient does not have an advance directive, information provided.     Objective    Vitals:    01/29/24 0803 01/29/24 0813   BP: 141/73 130/78   Pulse: 51    Resp: 20    Temp: 97.5 °F (36.4 °C)    SpO2: 98%    Weight: 96.2 kg (212 lb)    Height: 180.3 cm (71\")      Estimated body mass index is 29.57 kg/m² as calculated from the following:    Height as of this encounter: 180.3 cm (71\").    Weight as of this encounter: 96.2 kg (212 lb).           Does the patient have evidence of cognitive impairment? No          HEALTH RISK ASSESSMENT    Smoking Status:  Social History     Tobacco Use   Smoking Status Never   Smokeless Tobacco Never     Alcohol Consumption:  Social History     Substance and Sexual Activity   Alcohol Use " Not Currently     Fall Risk Screen:    STEPHANYADI Fall Risk Assessment was completed, and patient is at LOW risk for falls.Assessment completed on:2024    Depression Screenin/29/2024     8:10 AM   PHQ-2/PHQ-9 Depression Screening   Little Interest or Pleasure in Doing Things 0-->not at all   Feeling Down, Depressed or Hopeless 0-->not at all   PHQ-9: Brief Depression Severity Measure Score 0       Health Habits and Functional and Cognitive Screenin/29/2024     8:07 AM   Functional & Cognitive Status   Do you have difficulty preparing food and eating? No   Do you have difficulty bathing yourself, getting dressed or grooming yourself? No   Do you have difficulty using the toilet? No   Do you have difficulty moving around from place to place? No   Do you have trouble with steps or getting out of a bed or a chair? No   Current Diet Well Balanced Diet   Dental Exam Up to date   Eye Exam Up to date        Eye Exam Comment vision first   Exercise (times per week) 5 times per week        Exercise Comment farm work   Do you need help using the phone?  No   Are you deaf or do you have serious difficulty hearing?  Yes   Do you need help to go to places out of walking distance? No   Do you need help shopping? No   Do you need help preparing meals?  No   Do you need help with housework?  No   Do you need help with laundry? No   Do you need help taking your medications? No   Do you need help managing money? No   Do you ever drive or ride in a car without wearing a seat belt? No   Have you felt unusual stress, anger or loneliness in the last month? No   Who do you live with? Spouse   If you need help, do you have trouble finding someone available to you? No   Have you been bothered in the last four weeks by sexual problems? No   Do you have difficulty concentrating, remembering or making decisions? No       Age-appropriate Screening Schedule:  Refer to the list below for future screening recommendations based on  patient's age, sex and/or medical conditions. Orders for these recommended tests are listed in the plan section. The patient has been provided with a written plan.    Health Maintenance   Topic Date Due    TDAP/TD VACCINES (1 - Tdap) 01/29/2024 (Originally 3/15/1974)    COVID-19 Vaccine (7 - 2023-24 season) 03/04/2024    BMI FOLLOWUP  06/26/2024    LIPID PANEL  07/27/2024    ANNUAL WELLNESS VISIT  01/29/2025    COLORECTAL CANCER SCREENING  02/04/2026    HEPATITIS C SCREENING  Completed    INFLUENZA VACCINE  Completed    Pneumococcal Vaccine 65+  Completed    ZOSTER VACCINE  Completed                  CMS Preventative Services Quick Reference  Risk Factors Identified During Encounter  Fall Risk-High or Moderate: Discussed Fall Prevention in the home  Immunizations Discussed/Encouraged: Tdap, Influenza, Prevnar 20 (Pneumococcal 20-valent conjugate), Shingrix, COVID19, and RSV (Respiratory Syncytial Virus)  Dental Screening Recommended  Vision Screening Recommended  The above risks/problems have been discussed with the patient.  Pertinent information has been shared with the patient in the After Visit Summary.  An After Visit Summary and PPPS were made available to the patient.    Follow Up:   Next Medicare Wellness visit to be scheduled in 1 year.       Additional E&M Note during same encounter follows:  Patient has multiple medical problems which are significant and separately identifiable that require additional work above and beyond the Medicare Wellness Visit.      Chief Complaint  Medicare Wellness-subsequent and Hypertension    Subjective        HPI  Dion Espana is also being seen today for   Hypertension:  Patient is taking Amlodipine Benazepril, Metoprolol.    No chest pain or shortness of breath.    Hyperlipidemia:  He is taking atorvastatin, Fish Oil.    Gout:  He is on allopurinol with good control--no flares recently.    Allergies: He is on singular and flonase and doing ok with his medications.  "  ED/Prostate Cancer (removed):  He takes the viagra as needed and has good results when using.   Review of Systems   Constitutional:  Negative for fatigue.   Respiratory:  Negative for cough and shortness of breath.    Cardiovascular:  Negative for chest pain and leg swelling.   Gastrointestinal:  Negative for nausea and vomiting.   Psychiatric/Behavioral:  Negative for hallucinations and suicidal ideas.        Objective   Vital Signs:  /78   Pulse 51   Temp 97.5 °F (36.4 °C)   Resp 20   Ht 180.3 cm (71\")   Wt 96.2 kg (212 lb)   SpO2 98%   BMI 29.57 kg/m²     Physical Exam  Vitals reviewed.   Constitutional:       Appearance: Normal appearance. He is well-developed.   Cardiovascular:      Rate and Rhythm: Normal rate and regular rhythm.      Heart sounds: Normal heart sounds. No murmur heard.  Pulmonary:      Effort: Pulmonary effort is normal.      Breath sounds: Normal breath sounds.   Neurological:      Mental Status: He is alert and oriented to person, place, and time.      Cranial Nerves: No cranial nerve deficit.      Motor: No weakness.   Psychiatric:         Mood and Affect: Mood and affect normal.          The following data was reviewed by: NICOLAS Davies on 01/29/2024:  Common labs          3/22/2023    08:38 7/27/2023    08:31 10/16/2023    09:15   Common Labs   Glucose  85     BUN  16     Creatinine  1.05     Sodium  143     Potassium  3.9     Chloride  106     Calcium  9.3     Albumin  4.4     Total Bilirubin  0.6     Alkaline Phosphatase  111     AST (SGOT)  18     ALT (SGPT)  17     WBC  6.81     Hemoglobin  14.0     Hematocrit  41.2     Platelets  265     Total Cholesterol  117     Triglycerides  264     HDL Cholesterol  27     LDL Cholesterol   49     PSA 0.180   0.170    Uric Acid  5.2                  Assessment and Plan   Diagnoses and all orders for this visit:    1. Medicare annual wellness visit, subsequent (Primary)    2. Gout, unspecified cause, unspecified " chronicity, unspecified site  -     allopurinol (ZYLOPRIM) 300 MG tablet; Take 1 tablet by mouth Daily.  Dispense: 90 tablet; Refill: 1  -     Uric Acid    3. Benign essential hypertension  -     amLODIPine-benazepril (LOTREL 5-20) 5-20 MG per capsule; Take 1 capsule by mouth Daily.  Dispense: 90 capsule; Refill: 1  -     metoprolol succinate XL (TOPROL-XL) 100 MG 24 hr tablet; Take 1 tablet by mouth Daily.  Dispense: 90 tablet; Refill: 1    4. Pure hypercholesterolemia  -     atorvastatin (LIPITOR) 20 MG tablet; Take 1 tablet by mouth Every Night.  Dispense: 90 tablet; Refill: 1  -     Comprehensive Metabolic Panel  -     CBC & Differential  -     TSH  -     Lipid Panel    5. Muscle strain  -     etodolac (LODINE) 300 MG capsule; Take 1 capsule by mouth Daily.  Dispense: 90 capsule; Refill: 1    6. Neck stiffness  -     etodolac (LODINE) 300 MG capsule; Take 1 capsule by mouth Daily.  Dispense: 90 capsule; Refill: 1    7. Allergy, subsequent encounter  -     montelukast (Singulair) 10 MG tablet; Take 1 tablet by mouth Every Night.  Dispense: 90 tablet; Refill: 1    8. Erectile dysfunction, unspecified erectile dysfunction type  -     sildenafil (Viagra) 100 MG tablet; Take 1 tablet by mouth Daily As Needed for Erectile Dysfunction.  Dispense: 30 tablet; Refill: 3             Follow Up   Return in about 6 months (around 7/29/2024).  Patient was given instructions and counseling regarding his condition or for health maintenance advice. Please see specific information pulled into the AVS if appropriate.   Follow-up in 6 months for labs and appt. Call with any concerns or questions that you may have regarding your medications or history.    I have reviewed all medications and at this time no medications changes need to be adjusted for all chronic conditions.  Kenia Mclean, NICOLAS  01/29/2024

## 2024-02-05 NOTE — PROGRESS NOTES
Chief Complaint  Hyperlipidemia, Hypertension, and Gout    Subjective          Dion Espana presents to [facility]  History of Present Illness  He is here with his wife.  He said that the neck is doing better (but he is getting ready to bale some hay tonight).  He does have a PT Appointment but may cancel (he will keep me posted)  Hypertension:  Patient is taking Amlodipine Benazepril, Metoprolol.    No chest pain or shortness of breath.    Hyperlipidemia:  He is taking atorvastatin, Fish Oil.    Gout:  He is on allopurinol with good control--no flares recently.    Allergies: He is on singular and flonase and doing ok with his medications.   ED/Prostate Cancer (removed):  He takes the viagra as needed and has good results when using.     Depression: Not at risk    PHQ-2 Score: 0    and 6/12/2023               Allergies  Patient has no known allergies.    Social History     Tobacco Use    Smoking status: Never    Smokeless tobacco: Never   Vaping Use    Vaping Use: Never used   Substance Use Topics    Alcohol use: Not Currently    Drug use: Never       Family History   Problem Relation Age of Onset    Arthritis Mother     Stroke Mother     Heart disease Mother     Diabetes Mother     Heart disease Father     Stroke Brother     Colon cancer Neg Hx         There are no preventive care reminders to display for this patient.     Immunization History   Administered Date(s) Administered    COVID-19 (PFIZER) BIVALENT 12+YRS 01/12/2023    COVID-19 (PFIZER) Purple Cap Monovalent 03/15/2021, 04/05/2021, 11/29/2021    Covid-19 (Pfizer) Gray Cap Monovalent 08/24/2022    FluLaval/Fluzone >6mos 10/30/2019    Fluzone High-Dose 65+yrs 10/13/2022    Fluzone Quad >6mos (Multi-dose) 10/30/2019, 10/15/2020    Hepatitis A 07/31/2018, 03/14/2019    Pneumococcal Conjugate 13-Valent (PCV13) 01/25/2022    Pneumococcal Conjugate 20-Valent (PCV20) 02/02/2023    Shingrix 02/26/2018, 08/10/2018  "      Review of Systems   Constitutional:  Negative for fatigue.   Respiratory:  Negative for cough and shortness of breath.    Cardiovascular:  Negative for chest pain.   Gastrointestinal:  Negative for diarrhea, nausea and vomiting.      Objective       Vitals:    07/26/23 1459   BP: 125/65   Pulse: 65   Resp: 14   Temp: 98.1 °F (36.7 °C)   SpO2: 95%   Weight: 97.8 kg (215 lb 8 oz)   Height: 180.3 cm (71\")       Body mass index is 30.06 kg/m².         Physical Exam  Vitals reviewed.   Constitutional:       Appearance: Normal appearance. He is well-developed.   Cardiovascular:      Rate and Rhythm: Normal rate and regular rhythm.      Heart sounds: Normal heart sounds. No murmur heard.  Pulmonary:      Effort: Pulmonary effort is normal.      Breath sounds: Normal breath sounds.   Musculoskeletal:      Right lower leg: No edema.      Left lower leg: No edema.   Neurological:      Mental Status: He is alert and oriented to person, place, and time.      Cranial Nerves: No cranial nerve deficit.      Motor: No weakness.   Psychiatric:         Mood and Affect: Mood and affect normal.           Result Review :     The following data was reviewed by: NICOLAS Davies on 07/26/2023:    Common Labs   Common labs          9/15/2022    07:40 1/12/2023    07:44 3/22/2023    08:38   Common Labs   Glucose  91     BUN  14     Creatinine  0.91     Sodium  141     Potassium  4.0     Chloride  105     Calcium  9.3     Albumin  4.3     Total Bilirubin  0.6     Alkaline Phosphatase  104     AST (SGOT)  14     ALT (SGPT)  15     WBC  5.32     Hemoglobin  13.9     Hematocrit  41.2     Platelets  257     Total Cholesterol  114     Triglycerides  196     HDL Cholesterol  29     LDL Cholesterol   53     PSA 0.165   0.180    Uric Acid  5.2                      Assessment and Plan      Diagnoses and all orders for this visit:    1. Gout, unspecified cause, unspecified chronicity, unspecified site  -     allopurinol (ZYLOPRIM) 300 " MG tablet; Take 1 tablet by mouth Daily.  Dispense: 90 tablet; Refill: 1  -     Uric Acid; Future    2. Benign essential hypertension  -     amLODIPine-benazepril (LOTREL 5-20) 5-20 MG per capsule; Take 1 capsule by mouth Daily.  Dispense: 90 capsule; Refill: 1  -     metoprolol succinate XL (TOPROL-XL) 100 MG 24 hr tablet; Take 1 tablet by mouth Daily.  Dispense: 90 tablet; Refill: 1  -     Comprehensive Metabolic Panel; Future  -     CBC & Differential; Future  -     TSH; Future  -     Lipid Panel; Future    3. Pure hypercholesterolemia  -     atorvastatin (LIPITOR) 20 MG tablet; Take 1 tablet by mouth Every Night.  Dispense: 90 tablet; Refill: 1  -     Comprehensive Metabolic Panel; Future  -     CBC & Differential; Future  -     TSH; Future  -     Lipid Panel; Future    4. Muscle strain  -     etodolac (LODINE) 300 MG capsule; Take 1 capsule by mouth Daily.  Dispense: 90 capsule; Refill: 1    5. Neck stiffness  -     etodolac (LODINE) 300 MG capsule; Take 1 capsule by mouth Daily.  Dispense: 90 capsule; Refill: 1    6. Allergy, subsequent encounter  -     montelukast (Singulair) 10 MG tablet; Take 1 tablet by mouth Every Night.  Dispense: 90 tablet; Refill: 1    7. Erectile dysfunction, unspecified erectile dysfunction type  -     sildenafil (Viagra) 100 MG tablet; Take 1 tablet by mouth Daily As Needed for Erectile Dysfunction.  Dispense: 30 tablet; Refill: 3            Follow Up     Return in about 6 months (around 1/26/2024).  Follow-up in 6 months for labs and appt. Call with any concerns or questions that you may have regarding your medications or history.    He will come in for labs tomorrow.  He will keep me posted on cost of viagra as his insurance changed-we may need to go to the 20mg and he is aware.  He will also keep me posted if he does not want to do PT.  Patient was given instructions and counseling regarding his condition or for health maintenance advice. Please see specific information pulled  into the AVS if appropriate.     Parts of this note are electronic transcriptions/translations of spoken language to printed text using the Dragon Dictation system.          Kenia Mclean, APRN  07/26/2023   normal/soft/nontender/nondistended/normal active bowel sounds/no guarding/no rigidity

## 2024-02-09 ENCOUNTER — PATIENT ROUNDING (BHMG ONLY) (OUTPATIENT)
Dept: FAMILY MEDICINE CLINIC | Facility: CLINIC | Age: 69
End: 2024-02-09
Payer: MEDICARE

## 2024-02-09 NOTE — PROGRESS NOTES
A My-Chart message has been sent to the patient for PATIENT R[place]ING with [facility]

## 2024-04-09 ENCOUNTER — TELEPHONE (OUTPATIENT)
Dept: FAMILY MEDICINE CLINIC | Facility: CLINIC | Age: 69
End: 2024-04-09
Payer: MEDICARE

## 2024-04-09 DIAGNOSIS — Z85.46 HISTORY OF PROSTATE CANCER: Primary | ICD-10-CM

## 2024-04-09 NOTE — TELEPHONE ENCOUNTER
NEEDS REFERRAL FOR DR HANLEY HIS APPT  IS 4/29/24 - SAID HIS INSURANCE IS NOW AN HMO AND THOUGHT HE MIGHT NEED A REFERRAL FROM HIS PCP    IF YOU CAN'T DO THIS WITHOUT SEEING HIM LET ME KNOW AND I CAN GIVE HIM A CALL TO GET AN APPT SCHEDULED

## 2024-04-15 ENCOUNTER — LAB (OUTPATIENT)
Dept: LAB | Facility: HOSPITAL | Age: 69
End: 2024-04-15
Payer: MEDICARE

## 2024-04-15 DIAGNOSIS — Z85.46 HISTORY OF PROSTATE CANCER: ICD-10-CM

## 2024-04-15 LAB — PSA SERPL-MCNC: 0.24 NG/ML (ref 0–4)

## 2024-04-15 PROCEDURE — 36415 COLL VENOUS BLD VENIPUNCTURE: CPT

## 2024-04-15 PROCEDURE — 84153 ASSAY OF PSA TOTAL: CPT

## 2024-04-29 ENCOUNTER — OFFICE VISIT (OUTPATIENT)
Dept: UROLOGY | Facility: CLINIC | Age: 69
End: 2024-04-29
Payer: MEDICARE

## 2024-04-29 VITALS
HEIGHT: 71 IN | SYSTOLIC BLOOD PRESSURE: 140 MMHG | BODY MASS INDEX: 29.96 KG/M2 | DIASTOLIC BLOOD PRESSURE: 74 MMHG | WEIGHT: 214 LBS

## 2024-04-29 DIAGNOSIS — C61 PROSTATE CANCER: Primary | ICD-10-CM

## 2024-04-29 DIAGNOSIS — R97.21 INCREASING PSA LEVEL AFTER TREATMENT FOR PROSTATE CANCER: ICD-10-CM

## 2024-04-29 LAB
BILIRUB BLD-MCNC: ABNORMAL MG/DL
CLARITY, POC: CLEAR
COLOR UR: YELLOW
EXPIRATION DATE: ABNORMAL
GLUCOSE UR STRIP-MCNC: NEGATIVE MG/DL
KETONES UR QL: NEGATIVE
LEUKOCYTE EST, POC: NEGATIVE
Lab: ABNORMAL
NITRITE UR-MCNC: NEGATIVE MG/ML
PH UR: 5 [PH] (ref 5–8)
PROT UR STRIP-MCNC: ABNORMAL MG/DL
RBC # UR STRIP: NEGATIVE /UL
SP GR UR: 1.03 (ref 1–1.03)
UROBILINOGEN UR QL: ABNORMAL

## 2024-04-29 PROCEDURE — 81003 URINALYSIS AUTO W/O SCOPE: CPT | Performed by: UROLOGY

## 2024-04-29 PROCEDURE — 3077F SYST BP >= 140 MM HG: CPT | Performed by: UROLOGY

## 2024-04-29 PROCEDURE — 99213 OFFICE O/P EST LOW 20 MIN: CPT | Performed by: UROLOGY

## 2024-04-29 PROCEDURE — 1160F RVW MEDS BY RX/DR IN RCRD: CPT | Performed by: UROLOGY

## 2024-04-29 PROCEDURE — 3078F DIAST BP <80 MM HG: CPT | Performed by: UROLOGY

## 2024-04-29 PROCEDURE — 1159F MED LIST DOCD IN RCRD: CPT | Performed by: UROLOGY

## 2024-04-29 NOTE — PROGRESS NOTES
"Chief Complaint  Prostate Cancer    Subjective          Dion Espana presents to [facility] UROLOGY  History of Present Illness  The patient presents today for a follow-up.     The patient is doing well. His PSA is slowly rising. He reports that he has under gone a prostatectomy in 2016.         History of Present Illness  69 yo male s/p RALP with bilateral PLND for intermediate risk prostate cancer.   Surgery in Sept 2016  PSA 4.7  Biopsy - 3+4 L mid and 3+3 R base  cT1c     Final Path - Surgery in Sept.   Arp 3+4 ([facility])  [id number]  margins were negative     His PSA is slowly rising.   1 year ago it was 0.165 and then was 0.180 6 months ago.  In October 2023 it was 0.170.  In April 2024 it is 0.240.       He has no other complaints.      Objective   Vital Signs:   /74   Ht 180.3 cm (71\")   Wt 97.1 kg (214 lb)   BMI 29.85 kg/m²       Physical Exam  Vitals and nursing note reviewed.   Constitutional:       Appearance: Normal appearance. He is well-developed.   Pulmonary:      Effort: Pulmonary effort is normal.      Breath sounds: Normal air entry.   Neurological:      Mental Status: He is alert and oriented to person, place, and time.      Motor: Motor function is intact.   Psychiatric:         Mood and Affect: Mood normal.         Behavior: Behavior normal.          Result Review :   The following data was reviewed by: Silvana Alonso MD on 04/29/2024:    Results for orders placed or performed in visit on 04/29/24   POC Urinalysis Dipstick, Automated    Specimen: Urine   Result Value Ref Range    Color Yellow Yellow, Straw, Dark Yellow, Marce    Clarity, UA Clear Clear    Specific Gravity  1.030 1.005 - 1.030    pH, Urine 5.0 5.0 - 8.0    Leukocytes Negative Negative    Nitrite, UA Negative Negative    Protein, POC 30 mg/dL (A) Negative mg/dL    Glucose, UA Negative Negative mg/dL    Ketones, UA Negative Negative    Urobilinogen, UA 0.2 E.U./dL Normal, 0.2 E.U./dL "    Bilirubin Small (1+) (A) Negative    Blood, UA Negative Negative    Lot Number [id number]     Expiration Date 22,025        PSA          10/16/2023    09:15 4/15/2024    10:26   PSA   PSA 0.170  0.240                Assessment and Plan    Diagnoses and all orders for this visit:    1. Prostate cancer (Primary)  -     POC Urinalysis Dipstick, Automated  -     PSA DIAGNOSTIC; Future    2. Increasing PSA level after treatment for prostate cancer    Will repeat a PSA in 3 months.  We discussed starting hormone therapy at this time but we will hold off given that his PSA is slowly rising has just become above 0.2.  He is 8 years out from his initial treatment for prostate cancer.  At that time he had negative margins and negative lymph nodes.  We also discussed getting a PMSA scan but that we were unlikely to see any evidence of prostate cancer at this point given the low PSA.  Will hold off on doing that until his PSA gets higher.  He and his wife voiced understanding and are agreement with the plan.  Follow-up in 3 months with a PSA.          Follow Up       No follow-ups on file.  Patient was given instructions and counseling regarding his condition or for health maintenance advice. Please see specific information pulled into the AVS if appropriate.

## 2024-05-02 ENCOUNTER — OFFICE VISIT (OUTPATIENT)
Dept: FAMILY MEDICINE CLINIC | Facility: CLINIC | Age: 69
End: 2024-05-02
Payer: MEDICARE

## 2024-05-02 ENCOUNTER — LAB (OUTPATIENT)
Dept: LAB | Facility: HOSPITAL | Age: 69
End: 2024-05-02
Payer: MEDICARE

## 2024-05-02 ENCOUNTER — APPOINTMENT (OUTPATIENT)
Dept: GENERAL RADIOLOGY | Facility: HOSPITAL | Age: 69
DRG: 387 | End: 2024-05-02
Payer: MEDICARE

## 2024-05-02 ENCOUNTER — HOSPITAL ENCOUNTER (OUTPATIENT)
Dept: CT IMAGING | Facility: HOSPITAL | Age: 69
Discharge: HOME | End: 2024-05-02
Payer: MEDICARE

## 2024-05-02 ENCOUNTER — HOSPITAL ENCOUNTER (INPATIENT)
Facility: HOSPITAL | Age: 69
LOS: 3 days | Discharge: HOME | DRG: 387 | End: 2024-05-05
Attending: EMERGENCY MEDICINE | Admitting: INTERNAL MEDICINE
Payer: MEDICARE

## 2024-05-02 VITALS
HEIGHT: 71 IN | WEIGHT: 212.4 LBS | BODY MASS INDEX: 29.73 KG/M2 | OXYGEN SATURATION: 95 % | RESPIRATION RATE: 16 BRPM | TEMPERATURE: 97.6 F | DIASTOLIC BLOOD PRESSURE: 68 MMHG | HEART RATE: 64 BPM | SYSTOLIC BLOOD PRESSURE: 119 MMHG

## 2024-05-02 DIAGNOSIS — R10.84 GENERALIZED ABDOMINAL PAIN: ICD-10-CM

## 2024-05-02 DIAGNOSIS — R19.7 DIARRHEA, UNSPECIFIED TYPE: ICD-10-CM

## 2024-05-02 DIAGNOSIS — R14.0 BLOATING: ICD-10-CM

## 2024-05-02 DIAGNOSIS — R53.1 WEAKNESS: ICD-10-CM

## 2024-05-02 DIAGNOSIS — R63.0 DECREASED APPETITE: ICD-10-CM

## 2024-05-02 DIAGNOSIS — R11.2 NAUSEA AND VOMITING, UNSPECIFIED VOMITING TYPE: ICD-10-CM

## 2024-05-02 DIAGNOSIS — K21.9 GASTROESOPHAGEAL REFLUX DISEASE WITHOUT ESOPHAGITIS: ICD-10-CM

## 2024-05-02 DIAGNOSIS — R14.0 BLOATING: Primary | ICD-10-CM

## 2024-05-02 DIAGNOSIS — K56.600 SMALL BOWEL OBSTRUCTION, PARTIAL: Primary | ICD-10-CM

## 2024-05-02 DIAGNOSIS — C61 PROSTATE CANCER: ICD-10-CM

## 2024-05-02 PROBLEM — K56.609 SBO (SMALL BOWEL OBSTRUCTION): Status: ACTIVE | Noted: 2024-05-02

## 2024-05-02 PROBLEM — M54.30 SCIATICA: Status: RESOLVED | Noted: 2017-07-13 | Resolved: 2024-05-02

## 2024-05-02 LAB
027 TOXIN: NORMAL
ALBUMIN SERPL-MCNC: 3.8 G/DL (ref 3.5–5.2)
ALBUMIN/GLOB SERPL: 1.2 G/DL
ALP SERPL-CCNC: 128 U/L (ref 39–117)
ALT SERPL W P-5'-P-CCNC: 12 U/L (ref 1–41)
ANION GAP SERPL CALCULATED.3IONS-SCNC: 9.5 MMOL/L (ref 5–15)
AST SERPL-CCNC: 15 U/L (ref 1–40)
BACTERIA UR QL AUTO: NORMAL /HPF
BASOPHILS # BLD AUTO: 0.04 10*3/MM3 (ref 0–0.2)
BASOPHILS NFR BLD AUTO: 0.5 % (ref 0–1.5)
BILIRUB SERPL-MCNC: 1.2 MG/DL (ref 0–1.2)
BILIRUB UR QL STRIP: ABNORMAL
BUN SERPL-MCNC: 24 MG/DL (ref 8–23)
BUN/CREAT SERPL: 22 (ref 7–25)
C DIFF TOX GENS STL QL NAA+PROBE: NEGATIVE
CALCIUM SPEC-SCNC: 9.3 MG/DL (ref 8.6–10.5)
CHLORIDE SERPL-SCNC: 102 MMOL/L (ref 98–107)
CLARITY UR: CLEAR
CO2 SERPL-SCNC: 25.5 MMOL/L (ref 22–29)
COLOR UR: ABNORMAL
CREAT SERPL-MCNC: 1.09 MG/DL (ref 0.76–1.27)
CRP SERPL-MCNC: 3.71 MG/DL (ref 0–0.5)
D-LACTATE SERPL-SCNC: 1.1 MMOL/L (ref 0.5–2)
DEPRECATED RDW RBC AUTO: 43.8 FL (ref 37–54)
EGFRCR SERPLBLD CKD-EPI 2021: 73.9 ML/MIN/1.73
EOSINOPHIL # BLD AUTO: 0.16 10*3/MM3 (ref 0–0.4)
EOSINOPHIL NFR BLD AUTO: 1.9 % (ref 0.3–6.2)
ERYTHROCYTE [DISTWIDTH] IN BLOOD BY AUTOMATED COUNT: 13.4 % (ref 12.3–15.4)
ERYTHROCYTE [SEDIMENTATION RATE] IN BLOOD: 5 MM/HR (ref 0–20)
GLOBULIN UR ELPH-MCNC: 3.3 GM/DL
GLUCOSE SERPL-MCNC: 87 MG/DL (ref 65–99)
GLUCOSE UR STRIP-MCNC: NEGATIVE MG/DL
H. PYLORI ANTIGEN STOOL: POSITIVE
HCT VFR BLD AUTO: 44.7 % (ref 37.5–51)
HGB BLD-MCNC: 14.7 G/DL (ref 13–17.7)
HGB UR QL STRIP.AUTO: NEGATIVE
HYALINE CASTS UR QL AUTO: NORMAL /LPF
IMM GRANULOCYTES # BLD AUTO: 0.03 10*3/MM3 (ref 0–0.05)
IMM GRANULOCYTES NFR BLD AUTO: 0.4 % (ref 0–0.5)
KETONES UR QL STRIP: ABNORMAL
LEUKOCYTE ESTERASE UR QL STRIP.AUTO: ABNORMAL
LIPASE SERPL-CCNC: 28 U/L (ref 13–60)
LYMPHOCYTES # BLD AUTO: 1.99 10*3/MM3 (ref 0.7–3.1)
LYMPHOCYTES NFR BLD AUTO: 23.6 % (ref 19.6–45.3)
MCH RBC QN AUTO: 29.4 PG (ref 26.6–33)
MCHC RBC AUTO-ENTMCNC: 32.9 G/DL (ref 31.5–35.7)
MCV RBC AUTO: 89.4 FL (ref 79–97)
MONOCYTES # BLD AUTO: 1.41 10*3/MM3 (ref 0.1–0.9)
MONOCYTES NFR BLD AUTO: 16.7 % (ref 5–12)
NEUTROPHILS NFR BLD AUTO: 4.79 10*3/MM3 (ref 1.7–7)
NEUTROPHILS NFR BLD AUTO: 56.9 % (ref 42.7–76)
NITRITE UR QL STRIP: NEGATIVE
NRBC BLD AUTO-RTO: 0 /100 WBC (ref 0–0.2)
PH UR STRIP.AUTO: 5.5 [PH] (ref 5–8)
PLATELET # BLD AUTO: 297 10*3/MM3 (ref 140–450)
PMV BLD AUTO: 9.3 FL (ref 6–12)
POTASSIUM SERPL-SCNC: 4 MMOL/L (ref 3.5–5.2)
PROT SERPL-MCNC: 7.1 G/DL (ref 6–8.5)
PROT UR QL STRIP: ABNORMAL
PSA SERPL-MCNC: 0.26 NG/ML (ref 0–4)
RBC # BLD AUTO: 5 10*6/MM3 (ref 4.14–5.8)
RBC # UR STRIP: NORMAL /HPF
REF LAB TEST METHOD: NORMAL
SODIUM SERPL-SCNC: 137 MMOL/L (ref 136–145)
SP GR UR STRIP: >1.03 (ref 1–1.03)
SQUAMOUS #/AREA URNS HPF: NORMAL /HPF
UROBILINOGEN UR QL STRIP: ABNORMAL
WBC # UR STRIP: NORMAL /HPF
WBC NRBC COR # BLD AUTO: 8.42 10*3/MM3 (ref 3.4–10.8)

## 2024-05-02 PROCEDURE — 25010000002 ENOXAPARIN PER 10 MG: Performed by: INTERNAL MEDICINE

## 2024-05-02 PROCEDURE — 85025 COMPLETE CBC W/AUTO DIFF WBC: CPT

## 2024-05-02 PROCEDURE — 3078F DIAST BP <80 MM HG: CPT | Performed by: NURSE PRACTITIONER

## 2024-05-02 PROCEDURE — 25010000002 MORPHINE PER 10 MG: Performed by: EMERGENCY MEDICINE

## 2024-05-02 PROCEDURE — 1159F MED LIST DOCD IN RCRD: CPT | Performed by: NURSE PRACTITIONER

## 2024-05-02 PROCEDURE — 74176 CT ABD & PELVIS W/O CONTRAST: CPT

## 2024-05-02 PROCEDURE — 99223 1ST HOSP IP/OBS HIGH 75: CPT | Performed by: INTERNAL MEDICINE

## 2024-05-02 PROCEDURE — 74018 RADEX ABDOMEN 1 VIEW: CPT

## 2024-05-02 PROCEDURE — 3074F SYST BP LT 130 MM HG: CPT | Performed by: NURSE PRACTITIONER

## 2024-05-02 PROCEDURE — 99222 1ST HOSP IP/OBS MODERATE 55: CPT | Performed by: INTERNAL MEDICINE

## 2024-05-02 PROCEDURE — 25010000002 ONDANSETRON PER 1 MG: Performed by: EMERGENCY MEDICINE

## 2024-05-02 PROCEDURE — 99221 1ST HOSP IP/OBS SF/LOW 40: CPT | Performed by: NURSE PRACTITIONER

## 2024-05-02 PROCEDURE — 81001 URINALYSIS AUTO W/SCOPE: CPT | Performed by: EMERGENCY MEDICINE

## 2024-05-02 PROCEDURE — 25810000003 SODIUM CHLORIDE 0.9 % SOLUTION: Performed by: EMERGENCY MEDICINE

## 2024-05-02 PROCEDURE — 99285 EMERGENCY DEPT VISIT HI MDM: CPT

## 2024-05-02 PROCEDURE — 87493 C DIFF AMPLIFIED PROBE: CPT

## 2024-05-02 PROCEDURE — 86140 C-REACTIVE PROTEIN: CPT | Performed by: EMERGENCY MEDICINE

## 2024-05-02 PROCEDURE — 87505 NFCT AGENT DETECTION GI: CPT

## 2024-05-02 PROCEDURE — 87338 HPYLORI STOOL AG IA: CPT

## 2024-05-02 PROCEDURE — 84153 ASSAY OF PSA TOTAL: CPT

## 2024-05-02 PROCEDURE — 85652 RBC SED RATE AUTOMATED: CPT | Performed by: EMERGENCY MEDICINE

## 2024-05-02 PROCEDURE — 83690 ASSAY OF LIPASE: CPT | Performed by: EMERGENCY MEDICINE

## 2024-05-02 PROCEDURE — 36415 COLL VENOUS BLD VENIPUNCTURE: CPT

## 2024-05-02 PROCEDURE — 80053 COMPREHEN METABOLIC PANEL: CPT

## 2024-05-02 PROCEDURE — 25810000003 LACTATED RINGERS PER 1000 ML: Performed by: INTERNAL MEDICINE

## 2024-05-02 PROCEDURE — 1160F RVW MEDS BY RX/DR IN RCRD: CPT | Performed by: NURSE PRACTITIONER

## 2024-05-02 PROCEDURE — 99214 OFFICE O/P EST MOD 30 MIN: CPT | Performed by: NURSE PRACTITIONER

## 2024-05-02 PROCEDURE — 83605 ASSAY OF LACTIC ACID: CPT | Performed by: EMERGENCY MEDICINE

## 2024-05-02 RX ORDER — SODIUM CHLORIDE 0.9 % (FLUSH) 0.9 %
10 SYRINGE (ML) INJECTION AS NEEDED
Status: DISCONTINUED | OUTPATIENT
Start: 2024-05-02 | End: 2024-05-05 | Stop reason: HOSPADM

## 2024-05-02 RX ORDER — ONDANSETRON 2 MG/ML
4 INJECTION INTRAMUSCULAR; INTRAVENOUS ONCE
Status: COMPLETED | OUTPATIENT
Start: 2024-05-02 | End: 2024-05-02

## 2024-05-02 RX ORDER — SODIUM CHLORIDE 9 MG/ML
40 INJECTION, SOLUTION INTRAVENOUS AS NEEDED
Status: DISCONTINUED | OUTPATIENT
Start: 2024-05-02 | End: 2024-05-05 | Stop reason: HOSPADM

## 2024-05-02 RX ORDER — METOPROLOL SUCCINATE 50 MG/1
100 TABLET, EXTENDED RELEASE ORAL DAILY
Status: DISCONTINUED | OUTPATIENT
Start: 2024-05-03 | End: 2024-05-05 | Stop reason: HOSPADM

## 2024-05-02 RX ORDER — MORPHINE SULFATE 2 MG/ML
2 INJECTION, SOLUTION INTRAMUSCULAR; INTRAVENOUS EVERY 4 HOURS PRN
Status: DISCONTINUED | OUTPATIENT
Start: 2024-05-02 | End: 2024-05-05 | Stop reason: HOSPADM

## 2024-05-02 RX ORDER — PANTOPRAZOLE SODIUM 40 MG/10ML
40 INJECTION, POWDER, LYOPHILIZED, FOR SOLUTION INTRAVENOUS EVERY 12 HOURS SCHEDULED
Status: DISCONTINUED | OUTPATIENT
Start: 2024-05-02 | End: 2024-05-05 | Stop reason: HOSPADM

## 2024-05-02 RX ORDER — ONDANSETRON 4 MG/1
4 TABLET, ORALLY DISINTEGRATING ORAL EVERY 6 HOURS PRN
Status: DISCONTINUED | OUTPATIENT
Start: 2024-05-02 | End: 2024-05-05 | Stop reason: HOSPADM

## 2024-05-02 RX ORDER — SODIUM CHLORIDE, SODIUM LACTATE, POTASSIUM CHLORIDE, CALCIUM CHLORIDE 600; 310; 30; 20 MG/100ML; MG/100ML; MG/100ML; MG/100ML
100 INJECTION, SOLUTION INTRAVENOUS CONTINUOUS
Status: DISCONTINUED | OUTPATIENT
Start: 2024-05-02 | End: 2024-05-04

## 2024-05-02 RX ORDER — SODIUM CHLORIDE 0.9 % (FLUSH) 0.9 %
10 SYRINGE (ML) INJECTION EVERY 12 HOURS SCHEDULED
Status: DISCONTINUED | OUTPATIENT
Start: 2024-05-02 | End: 2024-05-05 | Stop reason: HOSPADM

## 2024-05-02 RX ORDER — MORPHINE SULFATE 2 MG/ML
2 INJECTION, SOLUTION INTRAMUSCULAR; INTRAVENOUS ONCE
Status: COMPLETED | OUTPATIENT
Start: 2024-05-02 | End: 2024-05-02

## 2024-05-02 RX ORDER — ONDANSETRON 2 MG/ML
4 INJECTION INTRAMUSCULAR; INTRAVENOUS EVERY 6 HOURS PRN
Status: DISCONTINUED | OUTPATIENT
Start: 2024-05-02 | End: 2024-05-05 | Stop reason: HOSPADM

## 2024-05-02 RX ORDER — ACETAMINOPHEN 325 MG/1
650 TABLET ORAL EVERY 4 HOURS PRN
Status: DISCONTINUED | OUTPATIENT
Start: 2024-05-02 | End: 2024-05-05 | Stop reason: HOSPADM

## 2024-05-02 RX ORDER — ENOXAPARIN SODIUM 100 MG/ML
40 INJECTION SUBCUTANEOUS EVERY 24 HOURS
Status: DISCONTINUED | OUTPATIENT
Start: 2024-05-02 | End: 2024-05-05 | Stop reason: HOSPADM

## 2024-05-02 RX ORDER — CALCIUM CARBONATE 500 MG/1
1 TABLET, CHEWABLE ORAL 2 TIMES DAILY PRN
Status: DISCONTINUED | OUTPATIENT
Start: 2024-05-02 | End: 2024-05-05 | Stop reason: HOSPADM

## 2024-05-02 RX ADMIN — PANTOPRAZOLE SODIUM 40 MG: 40 INJECTION, POWDER, FOR SOLUTION INTRAVENOUS at 21:05

## 2024-05-02 RX ADMIN — ENOXAPARIN SODIUM 40 MG: 100 INJECTION SUBCUTANEOUS at 18:23

## 2024-05-02 RX ADMIN — MORPHINE SULFATE 2 MG: 2 INJECTION, SOLUTION INTRAMUSCULAR; INTRAVENOUS at 14:08

## 2024-05-02 RX ADMIN — Medication 10 ML: at 21:05

## 2024-05-02 RX ADMIN — ONDANSETRON 4 MG: 2 INJECTION INTRAMUSCULAR; INTRAVENOUS at 14:07

## 2024-05-02 RX ADMIN — SODIUM CHLORIDE, POTASSIUM CHLORIDE, SODIUM LACTATE AND CALCIUM CHLORIDE 100 ML/HR: 600; 310; 30; 20 INJECTION, SOLUTION INTRAVENOUS at 21:04

## 2024-05-02 RX ADMIN — SODIUM CHLORIDE, POTASSIUM CHLORIDE, SODIUM LACTATE AND CALCIUM CHLORIDE 100 ML/HR: 600; 310; 30; 20 INJECTION, SOLUTION INTRAVENOUS at 15:43

## 2024-05-02 RX ADMIN — SODIUM CHLORIDE 1000 ML: 9 INJECTION, SOLUTION INTRAVENOUS at 14:05

## 2024-05-02 NOTE — PROGRESS NOTES
Chief Complaint  Abdominal Pain, Diarrhea, Heartburn (Burping and acid reflux), Nausea, and Bloated (States symptoms stared Monday)    Subjective          Dion Espana presents to [facility]  History of Present Illness  Patient is here with his wife.  He started to have diarrhea on and off for a little bit but then this past Monday he has had diarrhea he tried an Imodium but that has not helped.  He is got bloating discomfort heartburn indigestion burping and even yesterday he played down and all of a sudden he finally did vomit a bile stinky vomit.  He has been taking Prilosec and Tums thought that was helping but that has not helped.  He is only had a little bit of pudding yesterday he has not had much else to eat.  He is trying to push fluids.  He feels very weak and tired.  No chest pain no falls no dizziness at this time.  He does not see any black tarry stools or blood on the toilet paper.  He said that he is very just sore to touch and it migrates all around the stomach down into the pelvis.  No fevers that he is aware of.  Once again he is weak.                   Allergies  Patient has no known allergies.    Social History     Tobacco Use    Smoking status: Never    Smokeless tobacco: Never   Vaping Use    Vaping status: Never Used   Substance Use Topics    Alcohol use: Not Currently    Drug use: Never       Family History   Problem Relation Age of Onset    Arthritis Mother     Stroke Mother     Heart disease Mother     Diabetes Mother     Heart disease Father     Stroke Brother     Colon cancer Neg Hx         There are no preventive care reminders to display for this patient.     Immunization History   Administered Date(s) Administered    COVID-19 (PFIZER) BIVALENT 12+YRS 01/12/2023    COVID-19 (PFIZER) Purple Cap Monovalent 03/15/2021, 04/05/2021, 11/29/2021    COVID-19 (UNSPECIFIED) 01/08/2024    Covid-19 (Pfizer) Gray Cap Monovalent 08/24/2022    Fluzone (or  "Fluarix & Flulaval for VFC) >6mos 10/30/2019    Fluzone High-Dose 65+yrs 10/13/2022, 11/21/2023    Fluzone Quad >6mos (Multi-dose) 10/30/2019, 10/15/2020    Hepatitis A 07/31/2018, 03/14/2019    Pneumococcal Conjugate 13-Valent (PCV13) 01/25/2022    Pneumococcal Conjugate 20-Valent (PCV20) 02/02/2023    RSV, unspecified (Vaccine or MAB) 01/08/2024    Shingrix 02/26/2018, 08/10/2018       Review of Systems   Constitutional:  Positive for fatigue. Negative for fever.   Gastrointestinal:  Positive for abdominal distention, abdominal pain, diarrhea, nausea, vomiting, GERD and indigestion.        Objective       Vitals:    05/02/24 0828   BP: 119/68   Pulse: 64   Resp: 16   Temp: 97.6 °F (36.4 °C)   SpO2: 95%   Weight: 96.3 kg (212 lb 6.4 oz)   Height: 180.3 cm (71\")       Body mass index is 29.62 kg/m².         Physical Exam  Vitals reviewed.   Constitutional:       Appearance: Normal appearance. He is well-developed. He is ill-appearing.   Eyes:      Comments: His bottom  lids bilateral are pale noted on the inside   Cardiovascular:      Rate and Rhythm: Normal rate and regular rhythm.      Heart sounds: Normal heart sounds. No murmur heard.  Pulmonary:      Effort: Pulmonary effort is normal.      Breath sounds: Normal breath sounds.   Abdominal:      General: Bowel sounds are increased. There is distension. There are no signs of injury.      Palpations: There is no splenomegaly or mass.      Tenderness: There is abdominal tenderness. There is guarding and rebound. Negative signs include Juarez's sign.      Comments: His abdomen is very distended and tight on exam   Neurological:      Mental Status: He is alert and oriented to person, place, and time.      Cranial Nerves: No cranial nerve deficit.      Motor: No weakness.   Psychiatric:         Mood and Affect: Mood and affect normal.     Pulses are normal in arms and ankles.          Result Review :     The following data was reviewed by: NICOLAS Davies " on 05/02/2024:    Common Labs   Common labs          10/16/2023    09:15 1/29/2024    08:28 4/15/2024    10:26   Common Labs   Glucose  83     BUN  16     Creatinine  1.01     Sodium  140     Potassium  4.1     Chloride  102     Calcium  9.3     Albumin  4.4     Total Bilirubin  0.5     Alkaline Phosphatase  119     AST (SGOT)  19     ALT (SGPT)  23     WBC  7.53     Hemoglobin  14.5     Hematocrit  42.7     Platelets  302     Total Cholesterol  193     Triglycerides  307     HDL Cholesterol  41     LDL Cholesterol   100     PSA 0.170   0.240    Uric Acid  4.5            No Images in the past 120 days found..              Assessment and Plan      Assessment & Plan  Bloating    Decreased appetite    Diarrhea, unspecified type    Generalized abdominal pain    Gastroesophageal reflux disease without esophagitis    Weakness      Orders Placed This Encounter   Procedures    Enteric Bacterial Panel - Stool, Per Rectum    Clostridioides difficile Toxin, PCR - Stool, Per Rectum    CT Abdomen Pelvis Without Contrast    H. Pylori Antigen, Stool - Stool, Per Rectum    CBC Auto Differential    Comprehensive Metabolic Panel             Diagnosis Plan   1. Bloating  Enteric Bacterial Panel - Stool, Per Rectum    Clostridioides difficile Toxin, PCR - Stool, Per Rectum    H. Pylori Antigen, Stool - Stool, Per Rectum    CT Abdomen Pelvis Without Contrast    CBC Auto Differential    Comprehensive Metabolic Panel      2. Decreased appetite  Enteric Bacterial Panel - Stool, Per Rectum    Clostridioides difficile Toxin, PCR - Stool, Per Rectum    H. Pylori Antigen, Stool - Stool, Per Rectum    CT Abdomen Pelvis Without Contrast    CBC Auto Differential    Comprehensive Metabolic Panel      3. Diarrhea, unspecified type  Enteric Bacterial Panel - Stool, Per Rectum    Clostridioides difficile Toxin, PCR - Stool, Per Rectum    H. Pylori Antigen, Stool - Stool, Per Rectum    CT Abdomen Pelvis Without Contrast    CBC Auto Differential     Comprehensive Metabolic Panel      4. Generalized abdominal pain  CT Abdomen Pelvis Without Contrast      5. Gastroesophageal reflux disease without esophagitis        6. Weakness                  Follow Up     Return if symptoms worsen or fail to improve.  Hold the lodine for 2 weeks due to the stomach issues.  Discussed the risk with the anti-inflammatories and his stomach issues.  We are doing a stat CT along with blood work and stool cultures.  Discussed that we may need to actually end up doing it admission but we will see what it looks like.  I am going to try to keep him out of the hospital as much as I can.  Continue with the Prilosec currently.  Patient was given instructions and counseling regarding his condition or for health maintenance advice. Please see specific information pulled into the AVS if appropriate.   Patient left office in stable condition with his wife.  Parts of this note are electronic transcriptions/translations of spoken language to printed text using the Dragon Dictation system.          Kenia Mclean, APRN  05/02/2024

## 2024-05-02 NOTE — PAYOR COMM NOTE
"Vani Espana (69 y.o. Male)       Date of Birth   1955    Social Security Number       Address   [address] [place] [address]    Home Phone   [phone]    MRN   [id number]       [employer]    Marital Status                               Admission Date   5/2/24    Admission Type   Emergency    Admitting Provider   Manny Quintero MD    Attending Provider   Manny Quintero MD    Department, Room/Bed   [facility] EMERGENCY ROOM, 05/05       Discharge Date       Discharge Disposition       Discharge Destination                                 Attending Provider: Manny Quintero MD    Allergies: No Known Allergies    Isolation: None   Infection: C.difficile (rule out) (05/02/24)   Code Status: CPR    Ht: 180.3 cm (71\")   Wt: 104 kg (229 lb 0.9 oz)    Admission Cmt: None   Principal Problem: SBO (small bowel obstruction) [K56.609]                   Active Insurance as of 5/2/2024       Primary Coverage       Payor Plan Insurance Group Employer/Plan Group    ANTH MEDICARE REPLACEMENT ANTHEM MEDICARE ADVANTAGE [id number]       Payor Plan Address Payor Plan Phone Number Payor Plan Fax Number Effective Dates    [address] [phone]  1/1/2024 - None Entered    [facility] [id number]         Subscriber Name Subscriber Birth Date Member ID       VANI ESPANA 1955 [id number]                     Emergency Contacts        (Rel.) Home Phone Work Phone Mobile Phone    SOFY ESPANA (Spouse) -- -- [phone]           Intestinal Obstruction [facility] Inpatient Care       Indications Met   Last updated by Marisol So on 5/2/2024 9021     Review Status Created By   Primary Completed Marisol So      Criteria Review   Intestinal Obstruction [facility] Inpatient Care     Overall Determination: Indications Met     Criteria:  [×] Admission is indicated for  ALL  of the following :      [×] Signs and symptoms of bowel obstruction (eg, vomiting, inability to " tolerate PO intake, pain, distention) that are severe (feculent vomiting, Hypotension, electrolyte abnormality, evidence of bowel ischemia or suspected perforation), or persistent (eg, NG tube placed and will need to be continued, IV hydration support required)          5/2/2024  3:45 PM              -- 5/2/2024  3:45 PM by Marisol So --                  Pt. presents to [facility] with abdominal pain, diarrhea, heartburn(burping and acid reflux), Nausea and bloated. Started Monday. Very sore to touch and migrates around his stomach down into pelvis. Did vomit a bile stinky vomit yesterday.      [×] Imaging study consistent with bowel obstruction (ie, alternative diagnosis not more likely)          5/2/2024  3:45 PM              -- 5/2/2024  3:45 PM by Marisol So --                  CT Abdomen: Consistent with partial obstruction.     Notes:  -- 5/2/2024  3:45 PM by Marisol So --      ssessment & Plan      Bowel obstruction       NPO except ice chips       Insert NG tube       Place NG to low wall intermittent suction       Flat and upright abd xray in AM                    -- 5/2/2024  3:45 PM by Marisol So --      Medications Given in the Emergency Department:      Medications      sodium chloride 0.9 % flush 10 mL (has no administration in time range)      sodium chloride 0.9 % bolus 1,000 mL (1,000 mL Intravenous New Bag 5/2/24 1405)      ondansetron (ZOFRAN) injection 4 mg (4 mg Intravenous Given 5/2/24 1407)      morphine injection 2 mg (2 mg Intravenous Given 5/2/24 1408)            Final diagnoses:      Small bowel obstruction, partial      Nausea and vomiting, unspecified vomiting type                    -- 5/2/2024  3:45 PM by Marisol So --      Impression:       CT scan of the abdomen and pelvis without contrast demonstrating fatty       mural infiltration of the distal 15 cm of the terminal ileum suggesting       chronic inflammatory bowel disease. Abnormal dilatation of proximal        small bowel loops is consistent with partial obstruction.             I discussed findings with Ms. Stevie Huerta at 1040.               -- 5/2/2024  3:45 PM by Marisol So --      XR abdomen: Impression:       Antegrade oriented gastric tube projects over the GE junction. Consider       further advancement. Mild to moderately dilated loops of bowel in       keeping with suspected partial obstruction. Cholecystectomy clips.                      Chago: KIMBERLY [id number] Tax ID [id number]  Problem List             Codes Noted - Resolved       Hospital    * (Principal) SBO (small bowel obstruction) ICD-10-CM: K56.609  ICD-9-CM: 560.9 5/2/2024 - Present       Non-Hospital    Prostate cancer ICD-10-CM: C61  ICD-9-CM: 185 4/29/2024 - Present    Increasing PSA level after treatment for prostate cancer ICD-10-CM: R97.21  ICD-9-CM: 790.93, V10.46 4/29/2024 - Present    Arthritis ICD-10-CM: M19.90  ICD-9-CM: 716.90 7/14/2021 - Present    Diverticulitis ICD-10-CM: K57.92  ICD-9-CM: 562.11 7/14/2021 - Present    Hearing loss ICD-10-CM: H91.90  ICD-9-CM: 389.9 7/14/2021 - Present    Hemorrhoids ICD-10-CM: K64.9  ICD-9-CM: 455.6 7/14/2021 - Present    Lumbar radiculopathy ICD-10-CM: M54.16  ICD-9-CM: 724.4 7/14/2021 - Present    Tinnitus ICD-10-CM: H93.19  ICD-9-CM: 388.30 7/14/2021 - Present    Benign essential hypertension ICD-10-CM: I10  ICD-9-CM: 401.1 10/15/2020 - Present    Gout ICD-10-CM: M10.9  ICD-9-CM: 274.9 10/15/2020 - Present    Hyperlipidemia ICD-10-CM: E78.5  ICD-9-CM: 272.4 10/15/2020 - Present    Renal insufficiency ICD-10-CM: N28.9  ICD-9-CM: 593.9 10/15/2020 - Present    Bone lesion ICD-10-CM: M89.9  ICD-9-CM: 733.90 5/21/2020 - Present    History of prostate cancer ICD-10-CM: Z85.46  ICD-9-CM: V10.46 5/21/2020 - Present    Lumbar spinal stenosis ICD-10-CM: M48.061  ICD-9-CM: 724.02 5/21/2020 - Present    Paresthesia ICD-10-CM: R20.2  ICD-9-CM: 782.0 5/21/2020 - Present    Lumbar spondylosis ICD-10-CM:  M47.816  ICD-9-CM: 721.3 4/29/2020 - Present    Displacement of lumbar intervertebral disc without myelopathy ICD-10-CM: M51.26  ICD-9-CM: 722.10 7/13/2017 - Present    Sciatica ICD-10-CM: M54.30  ICD-9-CM: 724.3 7/13/2017 - Present     History & Physical    No notes of this type exist for this encounter.          Emergency Department Notes        Elgin Weller MD at 05/02/24 1340          Time: 1:40 PM EDT  Date of encounter:  5/2/2024  Independent Historian/Clinical History and Information was obtained by:   Patient and Consulting Physician    History is limited by: N/A    Chief Complaint: Abnormal CT scan showing partial small bowel obstruction and possible Crohn's flare, vomiting      History of Present Illness:  Patient is a 69 y.o. year old male with a couple days of nausea and vomiting and abdominal pain and distention who presents to the emergency department for evaluation of abnormal CT scan of the abdomen pelvis that was ordered by his PCP, showing partial small bowel obstruction secondary to inflammatory bowel disease such as Crohn's disease.    His PCP also notes he is positive for H. pylori antigen.    Discussed plan of management with the patient's gastroenterologist, Dr. Barth, and it was recommended to admit him to the hospital for IV fluids and antiemetics and observation given partial small bowel obstruction seen on CT.    Patient denies any known history of inflammatory bowel disease or Crohn's disease.  He did report having some diarrhea and vomiting a few days ago that started improving and then his symptoms got significantly worse and he had significant abdominal distention and continued vomiting and decreased p.o. intake.    No fevers noted.      HPI    Patient Care Team  Primary Care Provider: Kenia Mclean APRN    Past Medical History:     No Known Allergies  Past Medical History:   Diagnosis Date    Arthritis     Bone lesion 05/21/2020    Diverticulitis     Essential  hypertension 10/15/2020    Gout 10/15/2020    Headache     Hearing loss     Hemorrhoids     Hyperlipidemia 10/15/2020    Lumbago 07/13/2017    LOW BACK PAIN    Lumbar disc herniation 07/13/2017    LEFT; L3-4    Lumbar spinal stenosis 05/21/2020    Paresthesia 05/21/2020    Prostate cancer 05/21/2020    Radiculopathy, lumbosacral region 05/21/2020    Renal insufficiency 10/15/2020    Sciatica 07/13/2017    Tinnitus      Past Surgical History:   Procedure Laterality Date    BACK SURGERY      X2    CARPAL TUNNEL RELEASE      CHOLECYSTECTOMY      COLONOSCOPY  2006    DR HADLEY    HAND SURGERY      THUMB SURGERY    KNEE ARTHROSCOPY Bilateral     LUMBAR DISCECTOMY  07/19/2017    L3-4; DR UGARTE; MINIMALLY INVASIVE    OTHER SURGICAL HISTORY      FISTULA    PROSTATE BIOPSY      PROSTATECTOMY      VASECTOMY       Family History   Problem Relation Age of Onset    Arthritis Mother     Stroke Mother     Heart disease Mother     Diabetes Mother     Heart disease Father     Stroke Brother     Colon cancer Neg Hx        Home Medications:  Prior to Admission medications    Medication Sig Start Date End Date Taking? Authorizing Provider   allopurinol (ZYLOPRIM) 300 MG tablet Take 1 tablet by mouth Daily. 1/29/24   Kenia Mclean APRN   amLODIPine-benazepril (LOTREL 5-20) 5-20 MG per capsule Take 1 capsule by mouth Daily. 1/29/24   Kenia Mclean APRN   atorvastatin (LIPITOR) 20 MG tablet Take 1 tablet by mouth Every Night. 1/29/24   Kenia Mclean APRN   etodolac (LODINE) 300 MG capsule Take 1 capsule by mouth Daily.  Patient not taking: Reported on 5/2/2024 1/29/24   Kenia Mclean APRN   Krill Oil (Omega-3) 500 MG capsule krill oil 500 mg oral capsule take 1 capsule by oral route daily   Active    Emergency, Nurse Hermilo, RN   metoprolol succinate XL (TOPROL-XL) 100 MG 24 hr tablet Take 1 tablet by mouth Daily. 1/29/24   Kenia Mclean APRN   montelukast (Singulair) 10 MG tablet Take 1  "tablet by mouth Every Night. 1/29/24   Kenia Mclean APRN   sildenafil (Viagra) 100 MG tablet Take 1 tablet by mouth Daily As Needed for Erectile Dysfunction. 1/29/24   Kenia Mclean APRN        Social History:   Social History     Tobacco Use    Smoking status: Never    Smokeless tobacco: Never   Vaping Use    Vaping status: Never Used   Substance Use Topics    Alcohol use: Not Currently    Drug use: Never         Review of Systems:  Review of Systems   I performed a 10 point review of systems which was all negative, except for the positives found in the HPI above.  Physical Exam:  /61 (BP Location: Left arm, Patient Position: Sitting)   Pulse 55   Temp 98.1 °F (36.7 °C) (Oral)   Resp 16   Ht 180.3 cm (71\")   Wt 104 kg (229 lb 0.9 oz)   SpO2 92%   BMI 31.95 kg/m²     Physical Exam   General: Awake alert and in no obvious distress    HEENT: Head normocephalic atraumatic, eyes PERRLA EOMI, nose normal, oropharynx normal.    Neck: Supple full range of motion, no meningismus, no lymphadenopathy    Heart: Regular rate and rhythm, no murmurs or rubs, 2+ radial pulses bilaterally    Lungs: Clear to auscultation bilaterally without wheezes or crackles, no respiratory distress    Abdomen: Soft, hypoactive bowel sounds, moderate tenderness throughout the periumbilical and right side of the abdomen, moderate abdominal distention,, no rebound or guarding    Skin: Warm, dry, no rash    Musculoskeletal: Normal range of motion, no lower extremity edema    Neurologic: Oriented x3, no motor deficits no sensory deficits    Psychiatric: Mood appears stable, no psychosis          Procedures:  Procedures      Medical Decision Making:      Comorbidities that affect care:    None    External Notes reviewed:    Previous Radiological Studies: I reviewed the CT of the abdomen pelvis demonstrating small bowel obstruction secondary to terminal ileum inflammation that is chronic in scar tissue.      The following " orders were placed and all results were independently analyzed by me:  Orders Placed This Encounter   Procedures    Lactic Acid, Plasma    Lipase    Urinalysis With Microscopic If Indicated (No Culture) - Urine, Clean Catch    Urinalysis, Microscopic Only - Urine, Clean Catch    Sedimentation Rate    C-reactive Protein    Nasogastric tube insertion    NG Tube to Low Wall Suction    Surgery (on-call MD unless specified)    Gastroenterology (on-call MD unless specified)    Hospitalist (on-call MD unless specified)    Insert Peripheral IV       Medications Given in the Emergency Department:  Medications   sodium chloride 0.9 % flush 10 mL (has no administration in time range)   sodium chloride 0.9 % bolus 1,000 mL (1,000 mL Intravenous New Bag 5/2/24 1405)   ondansetron (ZOFRAN) injection 4 mg (4 mg Intravenous Given 5/2/24 1407)   morphine injection 2 mg (2 mg Intravenous Given 5/2/24 1408)        ED Course:         Labs:    Lab Results (last 24 hours)       Procedure Component Value Units Date/Time    PSA DIAGNOSTIC [[id number]] Collected: 05/02/24 1054    Specimen: Blood from Arm, Left Updated: 05/02/24 1113    CBC Auto Differential [[id number]]  (Abnormal) Collected: 05/02/24 1054    Specimen: Blood from Arm, Left Updated: 05/02/24 1121     WBC 8.42 10*3/mm3      RBC 5.00 10*6/mm3      Hemoglobin 14.7 g/dL      Hematocrit 44.7 %      MCV 89.4 fL      MCH 29.4 pg      MCHC 32.9 g/dL      RDW 13.4 %      RDW-SD 43.8 fl      MPV 9.3 fL      Platelets 297 10*3/mm3      Neutrophil % 56.9 %      Lymphocyte % 23.6 %      Monocyte % 16.7 %      Eosinophil % 1.9 %      Basophil % 0.5 %      Immature Grans % 0.4 %      Neutrophils, Absolute 4.79 10*3/mm3      Lymphocytes, Absolute 1.99 10*3/mm3      Monocytes, Absolute 1.41 10*3/mm3      Eosinophils, Absolute 0.16 10*3/mm3      Basophils, Absolute 0.04 10*3/mm3      Immature Grans, Absolute 0.03 10*3/mm3      nRBC 0.0 /100 WBC     Comprehensive Metabolic Panel [[id number]]   (Abnormal) Collected: 05/02/24 1054    Specimen: Blood from Arm, Left Updated: 05/02/24 1140     Glucose 87 mg/dL      BUN 24 mg/dL      Creatinine 1.09 mg/dL      Sodium 137 mmol/L      Potassium 4.0 mmol/L      Chloride 102 mmol/L      CO2 25.5 mmol/L      Calcium 9.3 mg/dL      Total Protein 7.1 g/dL      Albumin 3.8 g/dL      ALT (SGPT) 12 U/L      AST (SGOT) 15 U/L      Alkaline Phosphatase 128 U/L      Total Bilirubin 1.2 mg/dL      Globulin 3.3 gm/dL      A/G Ratio 1.2 g/dL      BUN/Creatinine Ratio 22.0     Anion Gap 9.5 mmol/L      eGFR 73.9 mL/min/1.73     Narrative:      GFR Normal >60  Chronic Kidney Disease <60  Kidney Failure <15      Enteric Bacterial Panel - Stool, Per Rectum [[id number]] Collected: 05/02/24 1119    Specimen: Stool from Per Rectum Updated: 05/02/24 1213    Clostridioides difficile Toxin, PCR - Stool, Per Rectum [[id number]] Collected: 05/02/24 1119    Specimen: Stool from Per Rectum Updated: 05/02/24 1340     Toxigenic C. difficile by PCR Negative     027 Toxin Presumptive Negative    Narrative:      The result indicates the absence of toxigenic C. difficile from stool specimen.     H. Pylori Antigen, Stool - Stool, Per Rectum [[id number]]  (Abnormal) Collected: 05/02/24 1119    Specimen: Stool from Per Rectum Updated: 05/02/24 1303     H.PYLORI AG STOOL Positive    Lactic Acid, Plasma [[id number]]  (Normal) Collected: 05/02/24 1248    Specimen: Blood Updated: 05/02/24 1313     Lactate 1.1 mmol/L     Lipase [[id number]]  (Normal) Collected: 05/02/24 1248    Specimen: Blood Updated: 05/02/24 1311     Lipase 28 U/L     Urinalysis With Microscopic If Indicated (No Culture) - Urine, Clean Catch [[id number]]  (Abnormal) Collected: 05/02/24 1248    Specimen: Urine, Clean Catch Updated: 05/02/24 1325     Color, UA Dark Yellow     Appearance, UA Clear     pH, UA 5.5     Specific Gravity, UA >1.030     Glucose, UA Negative     Ketones, UA Trace     Bilirubin, UA Moderate (2+)     Blood, UA Negative      Protein, UA 30 mg/dL (1+)     Leuk Esterase, UA Trace     Nitrite, UA Negative     Urobilinogen, UA 1.0 E.U./dL    Urinalysis, Microscopic Only - Urine, Clean Catch [[id number]] Collected: 05/02/24 1248    Specimen: Urine, Clean Catch Updated: 05/02/24 1325     RBC, UA 0-2 /HPF      WBC, UA 0-2 /HPF      Bacteria, UA None Seen /HPF      Squamous Epithelial Cells, UA 0-2 /HPF      Hyaline Casts, UA 3-6 /LPF      Methodology Automated Microscopy    C-reactive Protein [[id number]] Collected: 05/02/24 1248    Specimen: Blood Updated: 05/02/24 1406             Imaging:    CT Abdomen Pelvis Without Contrast    Result Date: 5/2/2024  CT ABDOMEN PELVIS WO CONTRAST-  Date of Exam: 5/2/2024 10:09 AM  Indication: abd pain, bloating; R14.0-Abdominal distension (gaseous); R63.0-Anorexia; R19.7-Diarrhea, unspecified; R10.84-Generalized abdominal pain.  Comparison: 12/11/2009  Technique: Axial CT images were obtained of the abdomen and pelvis without the administration of contrast. Reconstructed coronal and sagittal images were also obtained. Automated exposure control and iterative construction methods were used.   Findings: Mild scarring is seen at the lung bases.  The liver is of normal size. The gallbladder is absent, surgical clips are seen in the gallbladder. No pancreatic or adrenal mass is seen. The spleen is of normal size.  No renal or ureteral stones are seen. There is no evidence of hydronephrosis. The urinary bladder is not distended. The prostate is absent.  The stomach is not distended. The duodenum is not abnormally distended. Mildly dilated loops of jejunum and proximal ileum measure up to 4 cm in diameter, and small air-fluid levels are evident. The distal 15 cm of the terminal ileum have an abnormal appearance. The wall measures up to 8 mm in thickness, and fatty mural infiltration is evident. This is frequently associated with chronic inflammatory bowel disease, and a similar appearance was seen on included  portions on CT scan of the abdomen 2/11/2009. Findings suggest chronic inflammatory bowel disease with scarring producing partial distal small bowel obstruction. A high-grade obstruction is not evident, a small amount of fluid is seen throughout the colon. No significant stool is evident.  The anterior abdominal wall appears intact, no hernia is evident.  Degenerative changes are seen in the lower thoracic and lumbar spine.      Impression: CT scan of the abdomen and pelvis without contrast demonstrating fatty mural infiltration of the distal 15 cm of the terminal ileum suggesting chronic inflammatory bowel disease. Abnormal dilatation of proximal small bowel loops is consistent with partial obstruction.  I discussed findings with Ms. Stevie Huerta at 1040.      Electronically Signed By-ZACH FRASER MD On:5/2/2024 11:04 AM         Differential Diagnosis and Discussion:    Abdominal Pain: Based on the patient's signs and symptoms, I considered abdominal aortic aneurysm, small bowel obstruction, pancreatitis, acute cholecystitis, acute appendecitis, peptic ulcer disease, gastritis, colitis, endocrine disorders, irritable bowel syndrome and other differential diagnosis an etiology of the patient's abdominal pain.  Vomiting: Differential diagnosis includes but is not limited to migraine, labyrinthine disorders, psychogenic, metabolic and endocrine causes, peptic ulcer, gastric outlet obstruction, gastritis, gastroenteritis, appendicitis, intestinal obstruction, paralytic ileus, food poisoning, cholecystitis, acute hepatitis, acute pancreatitis, acute febrile illness, and myocardial infarction.    All labs were reviewed and interpreted by me.  CT scan radiology impression was interpreted by me.    MDM           This patient is a pleasant 69-year-old male sent in by his PCP because he had nausea and vomiting and abdominal pain and distention for the past couple of days and they performed outpatient CT abdomen pelvis  demonstrating partial small bowel obstruction secondary to distal or terminal ileum inflammation.    He does not have a known history of inflammatory bowel disease or Crohn's disease.    I am consulting gastroenterology to see him and also general surgery given the bowel obstruction, but I think he will warrant overnight admission and observation and I will start IV fluids and antiemetics.    I have spoken with both consultants, and it was recommended to place NG tube and also check inflammatory markers and hydrate him.                Patient Care Considerations:          Consultants/Shared Management Plan:    Hospitalist: I have discussed the case with the admitting hospitalist, who agrees to accept the patient for admission.  Consultant: I have discussed the case with the on-call gastroenterologist and general surgeon, who agrees to consult on the patient.    Social Determinants of Health:    Patient is independent, reliable, and has access to care.       Disposition and Care Coordination:    Admit:   Through independent evaluation of the patient's history, physical, and imperical data, the patient meets criteria for inpatient admission to the hospital.        Final diagnoses:   Small bowel obstruction, partial   Nausea and vomiting, unspecified vomiting type        ED Disposition       ED Disposition   Decision to Admit    Condition   --    Comment   --               This medical record created using voice recognition software.             Elgin Weller MD  05/02/24 1414      Electronically signed by Elgin Weller MD at 05/02/24 1414       Vital Signs (last day)       Date/Time Temp Temp src Pulse Resp BP Patient Position SpO2    05/02/24 1530 -- -- 66 -- 162/81 Sitting 96    05/02/24 1400 -- -- 55 16 124/61 Sitting 92    05/02/24 1300 -- -- 64 16 134/64 Sitting 92    05/02/24 1206 98.1 (36.7) Oral -- -- -- -- --    05/02/24 1204 -- -- 63 18 134/74 Sitting 95          Facility-Administered Medications as of  5/2/2024   Medication Dose Route Frequency Provider Last Rate Last Admin    benzocaine (HURRICAINE) 20 % liquid solution 2 spray  2 spray Mouth/Throat Once Alayna Li APRN        lactated ringers infusion  100 mL/hr Intravenous Continuous Manny Quintero  mL/hr at 05/02/24 1543 100 mL/hr at 05/02/24 1543    [COMPLETED] morphine injection 2 mg  2 mg Intravenous Once Elgin Weller MD   2 mg at 05/02/24 1408    [COMPLETED] ondansetron (ZOFRAN) injection 4 mg  4 mg Intravenous Once Elgin Weller MD   4 mg at 05/02/24 1407    [COMPLETED] sodium chloride 0.9 % bolus 1,000 mL  1,000 mL Intravenous Once Elgin Weller MD   Stopped at 05/02/24 1458    sodium chloride 0.9 % flush 10 mL  10 mL Intravenous PRN Elgin Weller MD         Orders (last 24 hrs)        Start     Ordered    05/03/24 0600  XR Abdomen Flat & Upright  1 Time Imaging         05/02/24 1435    05/02/24 1515  lactated ringers infusion  Continuous         05/02/24 1458    05/02/24 1500  Calprotectin, Fecal - Stool, Per Rectum  Once         05/02/24 1459    05/02/24 1452  XR Abdomen KUB  1 Time Imaging         05/02/24 1451    05/02/24 1445  benzocaine (HURRICAINE) 20 % liquid solution 2 spray  Once         05/02/24 1427    05/02/24 1443  Inpatient Admission  Once         05/02/24 1443    05/02/24 1443  Code Status and Medical Interventions:  Continuous         05/02/24 1443    05/02/24 1415  sodium chloride 0.9 % bolus 1,000 mL  Once         05/02/24 1352    05/02/24 1415  ondansetron (ZOFRAN) injection 4 mg  Once         05/02/24 1352    05/02/24 1415  morphine injection 2 mg  Once         05/02/24 1352    05/02/24 1406  Nasogastric tube insertion  Once         05/02/24 1406    05/02/24 1406  NG Tube to Low Wall Suction  Continuous         05/02/24 1406    05/02/24 1405  Sedimentation Rate  STAT         05/02/24 1405    05/02/24 1405  C-reactive Protein  STAT         05/02/24 1405    05/02/24 1357  Hospitalist (on-call MD unless specified)  " Once        Specialty:  Hospitalist  Provider:  Manny Quintero MD    05/02/24 1356    05/02/24 1354  Gastroenterology (on-call MD unless specified)  Once        Specialty:  Gastroenterology  Provider:  Jaziel Garcia MD    05/02/24 1353    05/02/24 1353  Insert Peripheral IV  Once        Placed in \"And\" Linked Group    05/02/24 1352    05/02/24 1353  Surgery (on-call MD unless specified)  Once        Specialty:  General Surgery  Provider:  Danita Bonilla MD    05/02/24 1353    05/02/24 1352  sodium chloride 0.9 % flush 10 mL  As Needed        Placed in \"And\" Linked Group    05/02/24 1352    05/02/24 1259  Urinalysis, Microscopic Only - Urine, Clean Catch  Once         05/02/24 1258    05/02/24 1213  Urinalysis With Microscopic If Indicated (No Culture) - Urine, Clean Catch  Once         05/02/24 1212    05/02/24 1212  Lactic Acid, Plasma  STAT         05/02/24 1212    05/02/24 1212  Lipase  STAT         05/02/24 1212    Signed and Held  Vital Signs  Every 4 Hours       Signed and Held    Signed and Held  Notify Provider (With Default Parameters)  Until Discontinued         Signed and Held    Signed and Held  Intake & Output  Every Shift       Signed and Held    Signed and Held  Weigh Patient  Once         Signed and Held    Signed and Held  Oral Care  2 Times Daily       Signed and Held    Signed and Held  Basic Metabolic Panel  Morning Draw         Signed and Held    Signed and Held  CBC Auto Differential  Morning Draw         Signed and Held    Signed and Held  Magnesium  Morning Draw         Signed and Held    Signed and Held  Insert Peripheral IV  Once         Signed and Held    Signed and Held  Saline Lock & Maintain IV Access  Continuous         Signed and Held    Signed and Held  sodium chloride 0.9 % flush 10 mL  Every 12 Hours Scheduled         Signed and Held    Signed and Held  sodium chloride 0.9 % flush 10 mL  As Needed         Signed and Held    Signed and Held  sodium chloride 0.9 % infusion " 40 mL  As Needed         Signed and Held    Signed and Held  acetaminophen (TYLENOL) tablet 650 mg  Every 4 Hours PRN         Signed and Held    Signed and Held  calcium carbonate (TUMS) chewable tablet 500 mg (200 mg elemental)  2 Times Daily PRN         Signed and Held    Signed and Held  ondansetron ODT (ZOFRAN-ODT) disintegrating tablet 4 mg  Every 6 Hours PRN         Signed and Held    Signed and Held  Tobacco Cessation Education  Prior to Discharge         Signed and Held    Signed and Held  Pharmacy to Dose enoxaparin (LOVENOX)  Continuous PRN         Signed and Held    Signed and Held  Activity - Ad Harriett  Until Discontinued         Signed and Held    Signed and Held  Incentive Spirometry  Every 4 Hours While Awake       Signed and Held    Signed and Held  NPO Diet NPO Type: Strict NPO  Diet Effective Now         Signed and Held    Signed and Held  Bowel Regimen Not Indicated  Once         Signed and Held    Pending  Low Wall Suction  Continuous         Pending                  Physician Progress Notes (last 24 hours)  Notes from 05/01/24 1546 through 05/02/24 1546   No notes of this type exist for this encounter.          Consult Notes (last 24 hours)        Alayna Li APRN at 05/02/24 1435        Consult Orders    1. Surgery (on-call MD unless specified) [[id number]] ordered by Elgin Weller MD at 05/02/24 1353                 History and Physical    Patient Name:  Dion Espana  YOB: 1955  [id number]    Referring Provider:  Dr. Weller      Patient Care Team:  Kenia Mclean APRN as PCP - General (Nurse Practitioner)  Silvana Alonso MD as Consulting Physician (Urology)    Reason for consult/Chief complaint:  abdominal pain    Subjective .     History of present illness:  Dion Espana is a 69 y.o. gentleman who presents to the ED at [facility] today for abdominal pain that started 4 days ago.  He reports he has had bloating, nausea, no appetite, and one episode of  vomiting.  He has a a past history of prostate cancer and a cholecystectomy.  He had a CT scan of the abdomen and pelvis without that indicates he has fatty mural infiltration of the distal 15 cm of the terminal ileum suggestion chronic inflammatory bowel disease and abnormal dilatation of proximal small bowel loops is consistent with a partial obstruction. VSS. Afebrile.       History:  Past Medical History:   Diagnosis Date    Arthritis     Bone lesion 05/21/2020    Diverticulitis     Essential hypertension 10/15/2020    Gout 10/15/2020    Headache     Hearing loss     Hemorrhoids     Hyperlipidemia 10/15/2020    Lumbago 07/13/2017    LOW BACK PAIN    Lumbar disc herniation 07/13/2017    LEFT; L3-4    Lumbar spinal stenosis 05/21/2020    Paresthesia 05/21/2020    Prostate cancer 05/21/2020    Radiculopathy, lumbosacral region 05/21/2020    Renal insufficiency 10/15/2020    Sciatica 07/13/2017    Tinnitus        Past Surgical History:   Procedure Laterality Date    BACK SURGERY      X2    CARPAL TUNNEL RELEASE      CHOLECYSTECTOMY      COLONOSCOPY  2006    DR HADLEY    HAND SURGERY      THUMB SURGERY    KNEE ARTHROSCOPY Bilateral     LUMBAR DISCECTOMY  07/19/2017    L3-4; DR UGARTE; MINIMALLY INVASIVE    OTHER SURGICAL HISTORY      FISTULA    PROSTATE BIOPSY      PROSTATECTOMY      VASECTOMY         Family History   Problem Relation Age of Onset    Arthritis Mother     Stroke Mother     Heart disease Mother     Diabetes Mother     Heart disease Father     Stroke Brother     Colon cancer Neg Hx        Social History     Tobacco Use    Smoking status: Never    Smokeless tobacco: Never   Vaping Use    Vaping status: Never Used   Substance Use Topics    Alcohol use: Not Currently    Drug use: Never       Review of Systems:  A complete ROS was performed and all are negative except for what is documented in the HPI.    MEDS:  Prior to Admission medications    Medication Sig Start Date End Date Taking? Authorizing  "Provider   allopurinol (ZYLOPRIM) 300 MG tablet Take 1 tablet by mouth Daily. 1/29/24   Kenia Mclean APRN   amLODIPine-benazepril (LOTREL 5-20) 5-20 MG per capsule Take 1 capsule by mouth Daily. 1/29/24   Kenia Mclean APRN   atorvastatin (LIPITOR) 20 MG tablet Take 1 tablet by mouth Every Night. 1/29/24   Kenia Mclean APRN   etodolac (LODINE) 300 MG capsule Take 1 capsule by mouth Daily.  Patient not taking: Reported on 5/2/2024 1/29/24   Kenia Mclean APRN   Krill Oil (Omega-3) 500 MG capsule krill oil 500 mg oral capsule take 1 capsule by oral route daily   Active    Emergency, Nurse Hermilo, RN   metoprolol succinate XL (TOPROL-XL) 100 MG 24 hr tablet Take 1 tablet by mouth Daily. 1/29/24   Kenia Mclean APRN   montelukast (Singulair) 10 MG tablet Take 1 tablet by mouth Every Night. 1/29/24   Kenia Mclean APRN   sildenafil (Viagra) 100 MG tablet Take 1 tablet by mouth Daily As Needed for Erectile Dysfunction. 1/29/24   Kenia Mclean APRN        benzocaine, 2 spray, Mouth/Throat, Once               Allergies:  Patient has no known allergies.    Objective         Physical Exam:      Constitutional:  well nourished, no acute distress, appears stated age /62 (BP Location: Left arm, Patient Position: Sitting)   Pulse 55   Temp 98.1 °F (36.7 °C) (Oral)   Resp 16   Ht 180.3 cm (71\")   Wt 104 kg (229 lb 0.9 oz)   SpO2 92%   BMI 31.95 kg/m²    Eyes:  anicteric sclerae, moist conjunctivae, no lid lag, PERRLA  ENMT:  oropharynx clear, moist mucous membranes, good dentition  Neck:   full ROM, trachea midline, no thyromegaly  Cardiovascular: RRR, S1 and S2 present, no MRG, heart rate 55, no pedal edema  Respiratory: lungs CTA, respirations even and unlabored  GI:  Abdomen firm, distended, right side tender to palpation,  nondistended, no HSM     Skin:  warm and dry, normal turgor, no rashes  Psychiatric:  alert and oriented x3, intact judgment and " insight, cooperative         Results Review: I have reviewed the patient's labs and imaging including CBC, BMP, CT of abd and pelvis without contrast    LABS/IMAGING:    WBC   Date Value Ref Range Status   05/02/2024 8.42 3.40 - 10.80 10*3/mm3 Final     RBC   Date Value Ref Range Status   05/02/2024 5.00 4.14 - 5.80 10*6/mm3 Final     Hemoglobin   Date Value Ref Range Status   05/02/2024 14.7 13.0 - 17.7 g/dL Final     Hematocrit   Date Value Ref Range Status   05/02/2024 44.7 37.5 - 51.0 % Final     MCV   Date Value Ref Range Status   05/02/2024 89.4 79.0 - 97.0 fL Final     MCH   Date Value Ref Range Status   05/02/2024 29.4 26.6 - 33.0 pg Final     MCHC   Date Value Ref Range Status   05/02/2024 32.9 31.5 - 35.7 g/dL Final     RDW   Date Value Ref Range Status   05/02/2024 13.4 12.3 - 15.4 % Final     RDW-SD   Date Value Ref Range Status   05/02/2024 43.8 37.0 - 54.0 fl Final     MPV   Date Value Ref Range Status   05/02/2024 9.3 6.0 - 12.0 fL Final     Platelets   Date Value Ref Range Status   05/02/2024 297 140 - 450 10*3/mm3 Final     Neutrophil %   Date Value Ref Range Status   05/02/2024 56.9 42.7 - 76.0 % Final     Lymphocyte %   Date Value Ref Range Status   05/02/2024 23.6 19.6 - 45.3 % Final     Monocyte %   Date Value Ref Range Status   05/02/2024 16.7 (H) 5.0 - 12.0 % Final     Eosinophil %   Date Value Ref Range Status   05/02/2024 1.9 0.3 - 6.2 % Final     Basophil %   Date Value Ref Range Status   05/02/2024 0.5 0.0 - 1.5 % Final     Immature Grans %   Date Value Ref Range Status   05/02/2024 0.4 0.0 - 0.5 % Final     Neutrophils, Absolute   Date Value Ref Range Status   05/02/2024 4.79 1.70 - 7.00 10*3/mm3 Final     Lymphocytes, Absolute   Date Value Ref Range Status   05/02/2024 1.99 0.70 - 3.10 10*3/mm3 Final     Monocytes, Absolute   Date Value Ref Range Status   05/02/2024 1.41 (H) 0.10 - 0.90 10*3/mm3 Final     Eosinophils, Absolute   Date Value Ref Range Status   05/02/2024 0.16 0.00 - 0.40  "10*3/mm3 Final     Basophils, Absolute   Date Value Ref Range Status   05/02/2024 0.04 0.00 - 0.20 10*3/mm3 Final     Immature Grans, Absolute   Date Value Ref Range Status   05/02/2024 0.03 0.00 - 0.05 10*3/mm3 Final     nRBC   Date Value Ref Range Status   05/02/2024 0.0 0.0 - 0.2 /100 WBC Final        Basic Metabolic Panel    Sodium Sodium   Date Value Ref Range Status   05/02/2024 137 136 - 145 mmol/L Final      Potassium Potassium   Date Value Ref Range Status   05/02/2024 4.0 3.5 - 5.2 mmol/L Final      Chloride Chloride   Date Value Ref Range Status   05/02/2024 102 98 - 107 mmol/L Final      Bicarbonate No results found for: \"PLASMABICARB\"   BUN BUN   Date Value Ref Range Status   05/02/2024 24 (H) 8 - 23 mg/dL Final      Creatinine Creatinine   Date Value Ref Range Status   05/02/2024 1.09 0.76 - 1.27 mg/dL Final      Calcium Calcium   Date Value Ref Range Status   05/02/2024 9.3 8.6 - 10.5 mg/dL Final      Glucose      No components found for: \"GLUCOSE.*\"        Lab Results   Component Value Date    GLUCOSE 87 05/02/2024    BUN 24 (H) 05/02/2024    CREATININE 1.09 05/02/2024    EGFRIFNONA 80 01/25/2022    BCR 22.0 05/02/2024    K 4.0 05/02/2024    CO2 25.5 05/02/2024    CALCIUM 9.3 05/02/2024    PROTENTOTREF 7.1 04/05/2022    ALBUMIN 3.8 05/02/2024    LABIL2 1.1 04/05/2022    AST 15 05/02/2024    ALT 12 05/02/2024          CT Abdomen Pelvis Without Contrast    Result Date: 5/2/2024  CT ABDOMEN PELVIS WO CONTRAST-  Date of Exam: 5/2/2024 10:09 AM  Indication: abd pain, bloating; R14.0-Abdominal distension (gaseous); R63.0-Anorexia; R19.7-Diarrhea, unspecified; R10.84-Generalized abdominal pain.  Comparison: 12/11/2009  Technique: Axial CT images were obtained of the abdomen and pelvis without the administration of contrast. Reconstructed coronal and sagittal images were also obtained. Automated exposure control and iterative construction methods were used.   Findings: Mild scarring is seen at the lung " bases.  The liver is of normal size. The gallbladder is absent, surgical clips are seen in the gallbladder. No pancreatic or adrenal mass is seen. The spleen is of normal size.  No renal or ureteral stones are seen. There is no evidence of hydronephrosis. The urinary bladder is not distended. The prostate is absent.  The stomach is not distended. The duodenum is not abnormally distended. Mildly dilated loops of jejunum and proximal ileum measure up to 4 cm in diameter, and small air-fluid levels are evident. The distal 15 cm of the terminal ileum have an abnormal appearance. The wall measures up to 8 mm in thickness, and fatty mural infiltration is evident. This is frequently associated with chronic inflammatory bowel disease, and a similar appearance was seen on included portions on CT scan of the abdomen 2/11/2009. Findings suggest chronic inflammatory bowel disease with scarring producing partial distal small bowel obstruction. A high-grade obstruction is not evident, a small amount of fluid is seen throughout the colon. No significant stool is evident.  The anterior abdominal wall appears intact, no hernia is evident.  Degenerative changes are seen in the lower thoracic and lumbar spine.      Impression: Impression: CT scan of the abdomen and pelvis without contrast demonstrating fatty mural infiltration of the distal 15 cm of the terminal ileum suggesting chronic inflammatory bowel disease. Abnormal dilatation of proximal small bowel loops is consistent with partial obstruction.  I discussed findings with Ms. Stevie Huerta at 1040.      Electronically Signed By-ZACH FRASER MD On:5/2/2024 11:04 AM            Assessment & Plan       Bowel obstruction   NPO except ice chips   Insert NG tube   Place NG to low wall intermittent suction   Flat and upright abd xray in AM            Electronically signed by NICOLAS Foreman, 05/02/24, 2:35 PM EDT.     Electronically signed by Alayna Li APRN at  05/02/24 1447

## 2024-05-02 NOTE — ED PROVIDER NOTES
Time: 1:40 PM EDT  Date of encounter:  5/2/2024  Independent Historian/Clinical History and Information was obtained by:   Patient and Consulting Physician    History is limited by: N/A    Chief Complaint: Abnormal CT scan showing partial small bowel obstruction and possible Crohn's flare, vomiting      History of Present Illness:  Patient is a 69 y.o. year old male with a couple days of nausea and vomiting and abdominal pain and distention who presents to the emergency department for evaluation of abnormal CT scan of the abdomen pelvis that was ordered by his PCP, showing partial small bowel obstruction secondary to inflammatory bowel disease such as Crohn's disease.    His PCP also notes he is positive for H. pylori antigen.    Discussed plan of management with the patient's gastroenterologist, Dr. Barth, and it was recommended to admit him to the hospital for IV fluids and antiemetics and observation given partial small bowel obstruction seen on CT.    Patient denies any known history of inflammatory bowel disease or Crohn's disease.  He did report having some diarrhea and vomiting a few days ago that started improving and then his symptoms got significantly worse and he had significant abdominal distention and continued vomiting and decreased p.o. intake.    No fevers noted.      HPI    Patient Care Team  Primary Care Provider: Kenia Mclean APRN    Past Medical History:     No Known Allergies  Past Medical History:   Diagnosis Date    Arthritis     Bone lesion 05/21/2020    Diverticulitis     Essential hypertension 10/15/2020    Gout 10/15/2020    Headache     Hearing loss     Hemorrhoids     Hyperlipidemia 10/15/2020    Lumbago 07/13/2017    LOW BACK PAIN    Lumbar disc herniation 07/13/2017    LEFT; L3-4    Lumbar spinal stenosis 05/21/2020    Paresthesia 05/21/2020    Prostate cancer 05/21/2020    Radiculopathy, lumbosacral region 05/21/2020    Renal insufficiency 10/15/2020    Sciatica 07/13/2017     Tinnitus      Past Surgical History:   Procedure Laterality Date    BACK SURGERY      X2    CARPAL TUNNEL RELEASE      CHOLECYSTECTOMY      COLONOSCOPY  2006    DR HADLEY    HAND SURGERY      THUMB SURGERY    KNEE ARTHROSCOPY Bilateral     LUMBAR DISCECTOMY  07/19/2017    L3-4; DR UGARTE; MINIMALLY INVASIVE    OTHER SURGICAL HISTORY      FISTULA    PROSTATE BIOPSY      PROSTATECTOMY      VASECTOMY       Family History   Problem Relation Age of Onset    Arthritis Mother     Stroke Mother     Heart disease Mother     Diabetes Mother     Heart disease Father     Stroke Brother     Colon cancer Neg Hx        Home Medications:  Prior to Admission medications    Medication Sig Start Date End Date Taking? Authorizing Provider   allopurinol (ZYLOPRIM) 300 MG tablet Take 1 tablet by mouth Daily. 1/29/24   Kenia Mclean APRN   amLODIPine-benazepril (LOTREL 5-20) 5-20 MG per capsule Take 1 capsule by mouth Daily. 1/29/24   Kenia Mclean APRN   atorvastatin (LIPITOR) 20 MG tablet Take 1 tablet by mouth Every Night. 1/29/24   Kenia Mclean APRN   etodolac (LODINE) 300 MG capsule Take 1 capsule by mouth Daily.  Patient not taking: Reported on 5/2/2024 1/29/24   Kenia Mclean APRN   Krill Oil (Omega-3) 500 MG capsule krill oil 500 mg oral capsule take 1 capsule by oral route daily   Active    Emergency, Nurse Epic, RN   metoprolol succinate XL (TOPROL-XL) 100 MG 24 hr tablet Take 1 tablet by mouth Daily. 1/29/24   Kenia Mclean APRN   montelukast (Singulair) 10 MG tablet Take 1 tablet by mouth Every Night. 1/29/24   Kenia Mclean APRN   sildenafil (Viagra) 100 MG tablet Take 1 tablet by mouth Daily As Needed for Erectile Dysfunction. 1/29/24   Kenia Mclean APRN        Social History:   Social History     Tobacco Use    Smoking status: Never    Smokeless tobacco: Never   Vaping Use    Vaping status: Never Used   Substance Use Topics    Alcohol use: Not Currently  "   Drug use: Never         Review of Systems:  Review of Systems   I performed a 10 point review of systems which was all negative, except for the positives found in the HPI above.  Physical Exam:  /61 (BP Location: Left arm, Patient Position: Sitting)   Pulse 55   Temp 98.1 °F (36.7 °C) (Oral)   Resp 16   Ht 180.3 cm (71\")   Wt 104 kg (229 lb 0.9 oz)   SpO2 92%   BMI 31.95 kg/m²     Physical Exam   General: Awake alert and in no obvious distress    HEENT: Head normocephalic atraumatic, eyes PERRLA EOMI, nose normal, oropharynx normal.    Neck: Supple full range of motion, no meningismus, no lymphadenopathy    Heart: Regular rate and rhythm, no murmurs or rubs, 2+ radial pulses bilaterally    Lungs: Clear to auscultation bilaterally without wheezes or crackles, no respiratory distress    Abdomen: Soft, hypoactive bowel sounds, moderate tenderness throughout the periumbilical and right side of the abdomen, moderate abdominal distention,, no rebound or guarding    Skin: Warm, dry, no rash    Musculoskeletal: Normal range of motion, no lower extremity edema    Neurologic: Oriented x3, no motor deficits no sensory deficits    Psychiatric: Mood appears stable, no psychosis          Procedures:  Procedures      Medical Decision Making:      Comorbidities that affect care:    None    External Notes reviewed:    Previous Radiological Studies: I reviewed the CT of the abdomen pelvis demonstrating small bowel obstruction secondary to terminal ileum inflammation that is chronic in scar tissue.      The following orders were placed and all results were independently analyzed by me:  Orders Placed This Encounter   Procedures    Lactic Acid, Plasma    Lipase    Urinalysis With Microscopic If Indicated (No Culture) - Urine, Clean Catch    Urinalysis, Microscopic Only - Urine, Clean Catch    Sedimentation Rate    C-reactive Protein    Nasogastric tube insertion    NG Tube to Low Wall Suction    Surgery (on-call MD " unless specified)    Gastroenterology (on-call MD unless specified)    Hospitalist (on-call MD unless specified)    Insert Peripheral IV       Medications Given in the Emergency Department:  Medications   sodium chloride 0.9 % flush 10 mL (has no administration in time range)   sodium chloride 0.9 % bolus 1,000 mL (1,000 mL Intravenous New Bag 5/2/24 1405)   ondansetron (ZOFRAN) injection 4 mg (4 mg Intravenous Given 5/2/24 1407)   morphine injection 2 mg (2 mg Intravenous Given 5/2/24 1408)        ED Course:         Labs:    Lab Results (last 24 hours)       Procedure Component Value Units Date/Time    PSA DIAGNOSTIC [[id number]] Collected: 05/02/24 1054    Specimen: Blood from Arm, Left Updated: 05/02/24 1113    CBC Auto Differential [[id number]]  (Abnormal) Collected: 05/02/24 1054    Specimen: Blood from Arm, Left Updated: 05/02/24 1121     WBC 8.42 10*3/mm3      RBC 5.00 10*6/mm3      Hemoglobin 14.7 g/dL      Hematocrit 44.7 %      MCV 89.4 fL      MCH 29.4 pg      MCHC 32.9 g/dL      RDW 13.4 %      RDW-SD 43.8 fl      MPV 9.3 fL      Platelets 297 10*3/mm3      Neutrophil % 56.9 %      Lymphocyte % 23.6 %      Monocyte % 16.7 %      Eosinophil % 1.9 %      Basophil % 0.5 %      Immature Grans % 0.4 %      Neutrophils, Absolute 4.79 10*3/mm3      Lymphocytes, Absolute 1.99 10*3/mm3      Monocytes, Absolute 1.41 10*3/mm3      Eosinophils, Absolute 0.16 10*3/mm3      Basophils, Absolute 0.04 10*3/mm3      Immature Grans, Absolute 0.03 10*3/mm3      nRBC 0.0 /100 WBC     Comprehensive Metabolic Panel [[id number]]  (Abnormal) Collected: 05/02/24 1054    Specimen: Blood from Arm, Left Updated: 05/02/24 1140     Glucose 87 mg/dL      BUN 24 mg/dL      Creatinine 1.09 mg/dL      Sodium 137 mmol/L      Potassium 4.0 mmol/L      Chloride 102 mmol/L      CO2 25.5 mmol/L      Calcium 9.3 mg/dL      Total Protein 7.1 g/dL      Albumin 3.8 g/dL      ALT (SGPT) 12 U/L      AST (SGOT) 15 U/L      Alkaline Phosphatase 128 U/L       Total Bilirubin 1.2 mg/dL      Globulin 3.3 gm/dL      A/G Ratio 1.2 g/dL      BUN/Creatinine Ratio 22.0     Anion Gap 9.5 mmol/L      eGFR 73.9 mL/min/1.73     Narrative:      GFR Normal >60  Chronic Kidney Disease <60  Kidney Failure <15      Enteric Bacterial Panel - Stool, Per Rectum [[id number]] Collected: 05/02/24 1119    Specimen: Stool from Per Rectum Updated: 05/02/24 1213    Clostridioides difficile Toxin, PCR - Stool, Per Rectum [[id number]] Collected: 05/02/24 1119    Specimen: Stool from Per Rectum Updated: 05/02/24 1340     Toxigenic C. difficile by PCR Negative     027 Toxin Presumptive Negative    Narrative:      The result indicates the absence of toxigenic C. difficile from stool specimen.     H. Pylori Antigen, Stool - Stool, Per Rectum [[id number]]  (Abnormal) Collected: 05/02/24 1119    Specimen: Stool from Per Rectum Updated: 05/02/24 1303     H.PYLORI AG STOOL Positive    Lactic Acid, Plasma [[id number]]  (Normal) Collected: 05/02/24 1248    Specimen: Blood Updated: 05/02/24 1313     Lactate 1.1 mmol/L     Lipase [[id number]]  (Normal) Collected: 05/02/24 1248    Specimen: Blood Updated: 05/02/24 1311     Lipase 28 U/L     Urinalysis With Microscopic If Indicated (No Culture) - Urine, Clean Catch [[id number]]  (Abnormal) Collected: 05/02/24 1248    Specimen: Urine, Clean Catch Updated: 05/02/24 1325     Color, UA Dark Yellow     Appearance, UA Clear     pH, UA 5.5     Specific Gravity, UA >1.030     Glucose, UA Negative     Ketones, UA Trace     Bilirubin, UA Moderate (2+)     Blood, UA Negative     Protein, UA 30 mg/dL (1+)     Leuk Esterase, UA Trace     Nitrite, UA Negative     Urobilinogen, UA 1.0 E.U./dL    Urinalysis, Microscopic Only - Urine, Clean Catch [[id number]] Collected: 05/02/24 1248    Specimen: Urine, Clean Catch Updated: 05/02/24 1325     RBC, UA 0-2 /HPF      WBC, UA 0-2 /HPF      Bacteria, UA None Seen /HPF      Squamous Epithelial Cells, UA 0-2 /HPF      Hyaline Casts, UA 3-6  /LPF      Methodology Automated Microscopy    C-reactive Protein [[id number]] Collected: 05/02/24 1248    Specimen: Blood Updated: 05/02/24 1406             Imaging:    CT Abdomen Pelvis Without Contrast    Result Date: 5/2/2024  CT ABDOMEN PELVIS WO CONTRAST-  Date of Exam: 5/2/2024 10:09 AM  Indication: abd pain, bloating; R14.0-Abdominal distension (gaseous); R63.0-Anorexia; R19.7-Diarrhea, unspecified; R10.84-Generalized abdominal pain.  Comparison: 12/11/2009  Technique: Axial CT images were obtained of the abdomen and pelvis without the administration of contrast. Reconstructed coronal and sagittal images were also obtained. Automated exposure control and iterative construction methods were used.   Findings: Mild scarring is seen at the lung bases.  The liver is of normal size. The gallbladder is absent, surgical clips are seen in the gallbladder. No pancreatic or adrenal mass is seen. The spleen is of normal size.  No renal or ureteral stones are seen. There is no evidence of hydronephrosis. The urinary bladder is not distended. The prostate is absent.  The stomach is not distended. The duodenum is not abnormally distended. Mildly dilated loops of jejunum and proximal ileum measure up to 4 cm in diameter, and small air-fluid levels are evident. The distal 15 cm of the terminal ileum have an abnormal appearance. The wall measures up to 8 mm in thickness, and fatty mural infiltration is evident. This is frequently associated with chronic inflammatory bowel disease, and a similar appearance was seen on included portions on CT scan of the abdomen 2/11/2009. Findings suggest chronic inflammatory bowel disease with scarring producing partial distal small bowel obstruction. A high-grade obstruction is not evident, a small amount of fluid is seen throughout the colon. No significant stool is evident.  The anterior abdominal wall appears intact, no hernia is evident.  Degenerative changes are seen in the lower thoracic  and lumbar spine.      Impression: CT scan of the abdomen and pelvis without contrast demonstrating fatty mural infiltration of the distal 15 cm of the terminal ileum suggesting chronic inflammatory bowel disease. Abnormal dilatation of proximal small bowel loops is consistent with partial obstruction.  I discussed findings with MsApurva Stevie Huerta at 1040.      Electronically Signed By-ZACH FRASER MD On:5/2/2024 11:04 AM         Differential Diagnosis and Discussion:    Abdominal Pain: Based on the patient's signs and symptoms, I considered abdominal aortic aneurysm, small bowel obstruction, pancreatitis, acute cholecystitis, acute appendecitis, peptic ulcer disease, gastritis, colitis, endocrine disorders, irritable bowel syndrome and other differential diagnosis an etiology of the patient's abdominal pain.  Vomiting: Differential diagnosis includes but is not limited to migraine, labyrinthine disorders, psychogenic, metabolic and endocrine causes, peptic ulcer, gastric outlet obstruction, gastritis, gastroenteritis, appendicitis, intestinal obstruction, paralytic ileus, food poisoning, cholecystitis, acute hepatitis, acute pancreatitis, acute febrile illness, and myocardial infarction.    All labs were reviewed and interpreted by me.  CT scan radiology impression was interpreted by me.    MDM           This patient is a pleasant 69-year-old male sent in by his PCP because he had nausea and vomiting and abdominal pain and distention for the past couple of days and they performed outpatient CT abdomen pelvis demonstrating partial small bowel obstruction secondary to distal or terminal ileum inflammation.    He does not have a known history of inflammatory bowel disease or Crohn's disease.    I am consulting gastroenterology to see him and also general surgery given the bowel obstruction, but I think he will warrant overnight admission and observation and I will start IV fluids and antiemetics.    I have spoken  with both consultants, and it was recommended to place NG tube and also check inflammatory markers and hydrate him.                Patient Care Considerations:          Consultants/Shared Management Plan:    Hospitalist: I have discussed the case with the admitting hospitalist, who agrees to accept the patient for admission.  Consultant: I have discussed the case with the on-call gastroenterologist and general surgeon, who agrees to consult on the patient.    Social Determinants of Health:    Patient is independent, reliable, and has access to care.       Disposition and Care Coordination:    Admit:   Through independent evaluation of the patient's history, physical, and imperical data, the patient meets criteria for inpatient admission to the hospital.        Final diagnoses:   Small bowel obstruction, partial   Nausea and vomiting, unspecified vomiting type        ED Disposition       ED Disposition   Decision to Admit    Condition   --    Comment   --               This medical record created using voice recognition software.             Elgin Weller MD  05/02/24 1828

## 2024-05-02 NOTE — CONSULTS
"[facility]  Initial Inpatient Consult Note    Referring Provider: Manny Quintero MD    Reason for Consultation: Partial SBO, and terminal ileitis    History of present illness: Patient is 69 y.o. male with known history of prostate cancer s/p radical prostatectomy, hypertension, gout, low back pain, lumbar disc herniation, lumbar spinal stenosis, radiculopathy admitted earlier this afternoon with partial SBO and inflammation of the terminal ileum.    Patient describes that 4 days ago he ate outside and since then not feeling well.  On Sunday and Monday he has watery diarrhea with 6-7 BM/day with bilateral lower quadrant abdominal pain with bloating and nausea.  Last night he had an episode of vomiting and again started having diarrhea since this morning.  For last 1 month, he is also experiencing indigestion, heartburn up to throat and wake up middle of night.  He takes over-the-counter Prilosec.  Patient went to see PCP earlier this morning and his stool for C. difficile, and enteric bacterial pathogen was negative, positive for H. pylori antigen.  Patient never had EGD.  He had colonoscopy with erosions at the ileocecal valve and cecal polyp was removed on 02/04/2021.  Pathology with active inflammation and granulation tissue whereas tubular adenoma of the cecal polyp.    Blood workup earlier this morning with normal WBC count, hemoglobin and platelets.  BUN 24 and creatinine 1.09.  Electrolytes are within normal range.  LFTs mildly elevated alkaline phosphatase of 128 and total bilirubin, AST and ALT were within normal range.  I ordered CRP and it is elevated to 3.71    Patient had CT abdomen and pelvis without oral and IV contrast showed \" dilation of jejunum and proximal ileum measures up to 4 cm in diameter with a small air-fluid level consistent with partial small bowel obstruction.  The distal 15 cm of the terminal ileum with wall thickness measures up to 8 mm and fatty mural " infiltration.  This is frequently associated with chronic inflammatory bowel disease.     Past Medical History:  Past Medical History:   Diagnosis Date    Arthritis     Bone lesion 05/21/2020    Diverticulitis     Essential hypertension 10/15/2020    Gout 10/15/2020    Headache     Hearing loss     Hemorrhoids     Hyperlipidemia 10/15/2020    Lumbago 07/13/2017    LOW BACK PAIN    Lumbar disc herniation 07/13/2017    LEFT; L3-4    Lumbar spinal stenosis 05/21/2020    Paresthesia 05/21/2020    Prostate cancer 05/21/2020    Radiculopathy, lumbosacral region 05/21/2020    Renal insufficiency 10/15/2020    Sciatica 07/13/2017    Tinnitus      Past Surgical History:  Past Surgical History:   Procedure Laterality Date    BACK SURGERY      X2    CARPAL TUNNEL RELEASE      CHOLECYSTECTOMY      COLONOSCOPY  2006    DR HADLEY    HAND SURGERY      THUMB SURGERY    KNEE ARTHROSCOPY Bilateral     LUMBAR DISCECTOMY  07/19/2017    L3-4; DR UGARTE; MINIMALLY INVASIVE    OTHER SURGICAL HISTORY      FISTULA    PROSTATE BIOPSY      PROSTATECTOMY      VASECTOMY        Social History:   Social History     Tobacco Use    Smoking status: Never    Smokeless tobacco: Never   Substance Use Topics    Alcohol use: Not Currently      Family History:  Family History   Problem Relation Age of Onset    Arthritis Mother     Stroke Mother     Heart disease Mother     Diabetes Mother     Heart disease Father     Stroke Brother     Colon cancer Neg Hx      Home Meds:  Medications Prior to Admission   Medication Sig Dispense Refill Last Dose    allopurinol (ZYLOPRIM) 300 MG tablet Take 1 tablet by mouth Daily. 90 tablet 1 5/1/2024    amLODIPine-benazepril (LOTREL 5-20) 5-20 MG per capsule Take 1 capsule by mouth Daily. 90 capsule 1 5/1/2024    atorvastatin (LIPITOR) 20 MG tablet Take 1 tablet by mouth Every Night. 90 tablet 1 5/1/2024    metoprolol succinate XL (TOPROL-XL) 100 MG 24 hr tablet Take 1 tablet by mouth Daily. 90 tablet 1 5/1/2024     montelukast (Singulair) 10 MG tablet Take 1 tablet by mouth Every Night. 90 tablet 1 5/1/2024    sildenafil (Viagra) 100 MG tablet Take 1 tablet by mouth Daily As Needed for Erectile Dysfunction. 30 tablet 3     Krill Oil (Omega-3) 500 MG capsule Take 500 mg by mouth Daily.        Current Meds:   benzocaine, 2 spray, Mouth/Throat, Once  enoxaparin, 40 mg, Subcutaneous, Q24H  [START ON 5/3/2024] metoprolol succinate XL, 100 mg, Oral, Daily  sodium chloride, 10 mL, Intravenous, Q12H      Allergies:  No Known Allergies    Objective     Vital Signs  Temp:  [97.6 °F (36.4 °C)-98.1 °F (36.7 °C)] 98.1 °F (36.7 °C)  Heart Rate:  [55-68] 66  Resp:  [16-18] 16  BP: (119-163)/(61-83) 163/83  Physical Exam:  General Appearance:    Alert and oriented, normal affect.  No acute distress.  NG tube with suction and container have 300 cc of bile containing clear fluid   Head:    Normocephalic, without obvious abnormality, atraumatic   Eyes:          conjunctivae and sclerae normal, no icterus, pupils round and reactive to light   Oral cavity: Moist and intact, normal dentation   Neck:   Supple, no adenopathy   Chest Wall/Lungs:    No abnormalities observed, equal on expansion bilaterally, no use of extrarespiratory muscles noted   Abdomen:     Soft, distended, enderness in bilateral lower quadrants; normal bowel sounds, no hepatospleenomegaly, no ascites   Extremities:   no edema, clubbing, or cyanosis   Skin:   No bruising or rash   Psychiatric:  normal mood and insight     Results Review:   I reviewed the patient's new clinical results.    Results from last 7 days   Lab Units 05/02/24  1054   WBC 10*3/mm3 8.42   HEMOGLOBIN g/dL 14.7   MCV fL 89.4   PLATELETS 10*3/mm3 297         Lab 05/02/24  1054   NEUTROS ABS 4.79     Results from last 7 days   Lab Units 05/02/24  1054   BUN mg/dL 24*   CREATININE mg/dL 1.09   SODIUM mmol/L 137   POTASSIUM mmol/L 4.0   CHLORIDE mmol/L 102   CO2 mmol/L 25.5   GLUCOSE mg/dL 87          Results from  last 7 days   Lab Units 05/02/24  1054   AST (SGOT) U/L 15   ALT (SGPT) U/L 12   ALK PHOS U/L 128*   BILIRUBIN mg/dL 1.2   TOTAL PROTEIN g/dL 7.1   ALBUMIN g/dL 3.8                Radiology:  XR Abdomen KUB    Result Date: 5/2/2024  Impression: NG tube tip in the stomach. Side port at the GE junction. Suggest advancing 5 cm.   Electronically Signed By-ZINA REYNA MD On:5/2/2024 4:18 PM      XR Abdomen KUB    Result Date: 5/2/2024  Impression: Antegrade oriented gastric tube projects over the GE junction. Consider further advancement. Mild to moderately dilated loops of bowel in keeping with suspected partial obstruction. Cholecystectomy clips.   Electronically Signed By-Linus Rose MD On:5/2/2024 3:17 PM      CT Abdomen Pelvis Without Contrast    Result Date: 5/2/2024  Impression: CT scan of the abdomen and pelvis without contrast demonstrating fatty mural infiltration of the distal 15 cm of the terminal ileum suggesting chronic inflammatory bowel disease. Abnormal dilatation of proximal small bowel loops is consistent with partial obstruction.  I discussed findings with Ms. Stevie Huerta at 1040.      Electronically Signed By-ZACH FRASER MD On:5/2/2024 11:04 AM       Impression:     SBO (small bowel obstruction)     Impression:    Partial SBO  Abnormal CT abdomen and pelvis with terminal ileitis and SBO  Elevated CRP  Acute diarrhea  Abdominal pain  GERD  H. pylori infection    Plan and Recommendations:    Patient admitted with 4 days history of abdominal pain, diarrhea, nausea and an episode of vomiting.  CT consistent with partial SBO and terminal ileitis.  Patient has elevated CRP.  Will check fecal calprotectin.  Will order a CT enterography with IV contrast for definitive evaluation of inflammation in the colon and small bowel.  N.p.o., NG decompression, IV fluids.  Hold antibiotics as well as IV steroid until further data is available.  IV Protonix for GERD.  Patient will need EGD and Colonoscopy  after resolution of partial SBO.    At discharge, patient needs Pylera for 10 days to eradicate H. pylori infection    I discussed the patients findings and my recommendations with patient, family, nursing staff, and consulting provider.      Electronically signed by Jaziel Garcia MD, 05/02/24, 7:14 PM EDT.

## 2024-05-02 NOTE — CONSULTS
History and Physical    Patient Name:  Dion Espana  YOB: 1955  [id number]    Referring Provider:  Dr. Weller      Patient Care Team:  Kenia Mclean APRN as PCP - General (Nurse Practitioner)  Silvana Alonso MD as Consulting Physician (Urology)    Reason for consult/Chief complaint:  abdominal pain    Subjective .     History of present illness:  Dion Espana is a 69 y.o. gentleman who presents to the ED at [facility] today for abdominal pain that started 4 days ago.  He reports he has had bloating, nausea, no appetite, and one episode of vomiting.  He has a a past history of prostate cancer and a cholecystectomy.  He had a CT scan of the abdomen and pelvis without that indicates he has fatty mural infiltration of the distal 15 cm of the terminal ileum suggestion chronic inflammatory bowel disease and abnormal dilatation of proximal small bowel loops is consistent with a partial obstruction. VSS. Afebrile.       History:  Past Medical History:   Diagnosis Date    Arthritis     Bone lesion 05/21/2020    Diverticulitis     Essential hypertension 10/15/2020    Gout 10/15/2020    Headache     Hearing loss     Hemorrhoids     Hyperlipidemia 10/15/2020    Lumbago 07/13/2017    LOW BACK PAIN    Lumbar disc herniation 07/13/2017    LEFT; L3-4    Lumbar spinal stenosis 05/21/2020    Paresthesia 05/21/2020    Prostate cancer 05/21/2020    Radiculopathy, lumbosacral region 05/21/2020    Renal insufficiency 10/15/2020    Sciatica 07/13/2017    Tinnitus        Past Surgical History:   Procedure Laterality Date    BACK SURGERY      X2    CARPAL TUNNEL RELEASE      CHOLECYSTECTOMY      COLONOSCOPY  2006    DR HADLEY    HAND SURGERY      THUMB SURGERY    KNEE ARTHROSCOPY Bilateral     LUMBAR DISCECTOMY  07/19/2017    L3-4; DR UGARTE; MINIMALLY INVASIVE    OTHER SURGICAL HISTORY      FISTULA    PROSTATE BIOPSY      PROSTATECTOMY      VASECTOMY         Family History   Problem Relation Age of Onset  "   Arthritis Mother     Stroke Mother     Heart disease Mother     Diabetes Mother     Heart disease Father     Stroke Brother     Colon cancer Neg Hx        Social History     Tobacco Use    Smoking status: Never    Smokeless tobacco: Never   Vaping Use    Vaping status: Never Used   Substance Use Topics    Alcohol use: Not Currently    Drug use: Never       Review of Systems:  A complete ROS was performed and all are negative except for what is documented in the HPI.    MEDS:  Prior to Admission medications    Medication Sig Start Date End Date Taking? Authorizing Provider   allopurinol (ZYLOPRIM) 300 MG tablet Take 1 tablet by mouth Daily. 1/29/24   Kenia Mclean APRN   amLODIPine-benazepril (LOTREL 5-20) 5-20 MG per capsule Take 1 capsule by mouth Daily. 1/29/24   Kenia Mclean APRN   atorvastatin (LIPITOR) 20 MG tablet Take 1 tablet by mouth Every Night. 1/29/24   Kenia Mclean APRN   etodolac (LODINE) 300 MG capsule Take 1 capsule by mouth Daily.  Patient not taking: Reported on 5/2/2024 1/29/24   Kenia Mclean APRN   Krill Oil (Omega-3) 500 MG capsule krill oil 500 mg oral capsule take 1 capsule by oral route daily   Active    Emergency, Nurse Epic, RN   metoprolol succinate XL (TOPROL-XL) 100 MG 24 hr tablet Take 1 tablet by mouth Daily. 1/29/24   Kenia Mclean APRN   montelukast (Singulair) 10 MG tablet Take 1 tablet by mouth Every Night. 1/29/24   Kenia Mclean APRN   sildenafil (Viagra) 100 MG tablet Take 1 tablet by mouth Daily As Needed for Erectile Dysfunction. 1/29/24   Kenia Mclean APRN        benzocaine, 2 spray, Mouth/Throat, Once               Allergies:  Patient has no known allergies.    Objective         Physical Exam:      Constitutional:  well nourished, no acute distress, appears stated age /62 (BP Location: Left arm, Patient Position: Sitting)   Pulse 55   Temp 98.1 °F (36.7 °C) (Oral)   Resp 16   Ht 180.3 cm (71\")   " Wt 104 kg (229 lb 0.9 oz)   SpO2 92%   BMI 31.95 kg/m²    Eyes:  anicteric sclerae, moist conjunctivae, no lid lag, PERRLA  ENMT:  oropharynx clear, moist mucous membranes, good dentition  Neck:   full ROM, trachea midline, no thyromegaly  Cardiovascular: RRR, S1 and S2 present, no MRG, heart rate 55, no pedal edema  Respiratory: lungs CTA, respirations even and unlabored  GI:  Abdomen firm, distended, right side tender to palpation,  nondistended, no HSM     Skin:  warm and dry, normal turgor, no rashes  Psychiatric:  alert and oriented x3, intact judgment and insight, cooperative         Results Review: I have reviewed the patient's labs and imaging including CBC, BMP, CT of abd and pelvis without contrast    LABS/IMAGING:    WBC   Date Value Ref Range Status   05/02/2024 8.42 3.40 - 10.80 10*3/mm3 Final     RBC   Date Value Ref Range Status   05/02/2024 5.00 4.14 - 5.80 10*6/mm3 Final     Hemoglobin   Date Value Ref Range Status   05/02/2024 14.7 13.0 - 17.7 g/dL Final     Hematocrit   Date Value Ref Range Status   05/02/2024 44.7 37.5 - 51.0 % Final     MCV   Date Value Ref Range Status   05/02/2024 89.4 79.0 - 97.0 fL Final     MCH   Date Value Ref Range Status   05/02/2024 29.4 26.6 - 33.0 pg Final     MCHC   Date Value Ref Range Status   05/02/2024 32.9 31.5 - 35.7 g/dL Final     RDW   Date Value Ref Range Status   05/02/2024 13.4 12.3 - 15.4 % Final     RDW-SD   Date Value Ref Range Status   05/02/2024 43.8 37.0 - 54.0 fl Final     MPV   Date Value Ref Range Status   05/02/2024 9.3 6.0 - 12.0 fL Final     Platelets   Date Value Ref Range Status   05/02/2024 297 140 - 450 10*3/mm3 Final     Neutrophil %   Date Value Ref Range Status   05/02/2024 56.9 42.7 - 76.0 % Final     Lymphocyte %   Date Value Ref Range Status   05/02/2024 23.6 19.6 - 45.3 % Final     Monocyte %   Date Value Ref Range Status   05/02/2024 16.7 (H) 5.0 - 12.0 % Final     Eosinophil %   Date Value Ref Range Status   05/02/2024 1.9 0.3 -  "6.2 % Final     Basophil %   Date Value Ref Range Status   05/02/2024 0.5 0.0 - 1.5 % Final     Immature Grans %   Date Value Ref Range Status   05/02/2024 0.4 0.0 - 0.5 % Final     Neutrophils, Absolute   Date Value Ref Range Status   05/02/2024 4.79 1.70 - 7.00 10*3/mm3 Final     Lymphocytes, Absolute   Date Value Ref Range Status   05/02/2024 1.99 0.70 - 3.10 10*3/mm3 Final     Monocytes, Absolute   Date Value Ref Range Status   05/02/2024 1.41 (H) 0.10 - 0.90 10*3/mm3 Final     Eosinophils, Absolute   Date Value Ref Range Status   05/02/2024 0.16 0.00 - 0.40 10*3/mm3 Final     Basophils, Absolute   Date Value Ref Range Status   05/02/2024 0.04 0.00 - 0.20 10*3/mm3 Final     Immature Grans, Absolute   Date Value Ref Range Status   05/02/2024 0.03 0.00 - 0.05 10*3/mm3 Final     nRBC   Date Value Ref Range Status   05/02/2024 0.0 0.0 - 0.2 /100 WBC Final        Basic Metabolic Panel    Sodium Sodium   Date Value Ref Range Status   05/02/2024 137 136 - 145 mmol/L Final      Potassium Potassium   Date Value Ref Range Status   05/02/2024 4.0 3.5 - 5.2 mmol/L Final      Chloride Chloride   Date Value Ref Range Status   05/02/2024 102 98 - 107 mmol/L Final      Bicarbonate No results found for: \"PLASMABICARB\"   BUN BUN   Date Value Ref Range Status   05/02/2024 24 (H) 8 - 23 mg/dL Final      Creatinine Creatinine   Date Value Ref Range Status   05/02/2024 1.09 0.76 - 1.27 mg/dL Final      Calcium Calcium   Date Value Ref Range Status   05/02/2024 9.3 8.6 - 10.5 mg/dL Final      Glucose      No components found for: \"GLUCOSE.*\"        Lab Results   Component Value Date    GLUCOSE 87 05/02/2024    BUN 24 (H) 05/02/2024    CREATININE 1.09 05/02/2024    EGFRIFNONA 80 01/25/2022    BCR 22.0 05/02/2024    K 4.0 05/02/2024    CO2 25.5 05/02/2024    CALCIUM 9.3 05/02/2024    PROTENTOTREF 7.1 04/05/2022    ALBUMIN 3.8 05/02/2024    LABIL2 1.1 04/05/2022    AST 15 05/02/2024    ALT 12 05/02/2024          CT Abdomen Pelvis Without " Contrast    Result Date: 5/2/2024  CT ABDOMEN PELVIS WO CONTRAST-  Date of Exam: 5/2/2024 10:09 AM  Indication: abd pain, bloating; R14.0-Abdominal distension (gaseous); R63.0-Anorexia; R19.7-Diarrhea, unspecified; R10.84-Generalized abdominal pain.  Comparison: 12/11/2009  Technique: Axial CT images were obtained of the abdomen and pelvis without the administration of contrast. Reconstructed coronal and sagittal images were also obtained. Automated exposure control and iterative construction methods were used.   Findings: Mild scarring is seen at the lung bases.  The liver is of normal size. The gallbladder is absent, surgical clips are seen in the gallbladder. No pancreatic or adrenal mass is seen. The spleen is of normal size.  No renal or ureteral stones are seen. There is no evidence of hydronephrosis. The urinary bladder is not distended. The prostate is absent.  The stomach is not distended. The duodenum is not abnormally distended. Mildly dilated loops of jejunum and proximal ileum measure up to 4 cm in diameter, and small air-fluid levels are evident. The distal 15 cm of the terminal ileum have an abnormal appearance. The wall measures up to 8 mm in thickness, and fatty mural infiltration is evident. This is frequently associated with chronic inflammatory bowel disease, and a similar appearance was seen on included portions on CT scan of the abdomen 2/11/2009. Findings suggest chronic inflammatory bowel disease with scarring producing partial distal small bowel obstruction. A high-grade obstruction is not evident, a small amount of fluid is seen throughout the colon. No significant stool is evident.  The anterior abdominal wall appears intact, no hernia is evident.  Degenerative changes are seen in the lower thoracic and lumbar spine.      Impression: Impression: CT scan of the abdomen and pelvis without contrast demonstrating fatty mural infiltration of the distal 15 cm of the terminal ileum suggesting  chronic inflammatory bowel disease. Abnormal dilatation of proximal small bowel loops is consistent with partial obstruction.  I discussed findings with Ms. Stevie Huerta at 1040.      Electronically Signed By-ZACH FRASER MD On:5/2/2024 11:04 AM            Assessment & Plan       Bowel obstruction   NPO except ice chips   Insert NG tube   Place NG to low wall intermittent suction   Flat and upright abd xray in AM            Electronically signed by NICOLAS Foreman, 05/02/24, 2:35 PM EDT.

## 2024-05-03 ENCOUNTER — APPOINTMENT (OUTPATIENT)
Dept: CT IMAGING | Facility: HOSPITAL | Age: 69
DRG: 387 | End: 2024-05-03
Payer: MEDICARE

## 2024-05-03 ENCOUNTER — APPOINTMENT (OUTPATIENT)
Dept: GENERAL RADIOLOGY | Facility: HOSPITAL | Age: 69
DRG: 387 | End: 2024-05-03
Payer: MEDICARE

## 2024-05-03 LAB
ANION GAP SERPL CALCULATED.3IONS-SCNC: 9.3 MMOL/L (ref 5–15)
BASOPHILS # BLD AUTO: 0.03 10*3/MM3 (ref 0–0.2)
BASOPHILS NFR BLD AUTO: 0.5 % (ref 0–1.5)
BUN SERPL-MCNC: 21 MG/DL (ref 8–23)
BUN/CREAT SERPL: 24.7 (ref 7–25)
C COLI+JEJ+UPSA DNA STL QL NAA+NON-PROBE: NOT DETECTED
CALCIUM SPEC-SCNC: 8 MG/DL (ref 8.6–10.5)
CHLORIDE SERPL-SCNC: 103 MMOL/L (ref 98–107)
CO2 SERPL-SCNC: 24.7 MMOL/L (ref 22–29)
CREAT SERPL-MCNC: 0.85 MG/DL (ref 0.76–1.27)
DEPRECATED RDW RBC AUTO: 42.5 FL (ref 37–54)
EC STX1+STX2 GENES STL QL NAA+NON-PROBE: NOT DETECTED
EGFRCR SERPLBLD CKD-EPI 2021: 94.6 ML/MIN/1.73
EOSINOPHIL # BLD AUTO: 0.18 10*3/MM3 (ref 0–0.4)
EOSINOPHIL NFR BLD AUTO: 2.7 % (ref 0.3–6.2)
ERYTHROCYTE [DISTWIDTH] IN BLOOD BY AUTOMATED COUNT: 13.1 % (ref 12.3–15.4)
GLUCOSE SERPL-MCNC: 84 MG/DL (ref 65–99)
HCT VFR BLD AUTO: 37.3 % (ref 37.5–51)
HGB BLD-MCNC: 12.5 G/DL (ref 13–17.7)
IMM GRANULOCYTES # BLD AUTO: 0.03 10*3/MM3 (ref 0–0.05)
IMM GRANULOCYTES NFR BLD AUTO: 0.5 % (ref 0–0.5)
LYMPHOCYTES # BLD AUTO: 1.48 10*3/MM3 (ref 0.7–3.1)
LYMPHOCYTES NFR BLD AUTO: 22.6 % (ref 19.6–45.3)
MAGNESIUM SERPL-MCNC: 1.4 MG/DL (ref 1.6–2.4)
MCH RBC QN AUTO: 29.6 PG (ref 26.6–33)
MCHC RBC AUTO-ENTMCNC: 33.5 G/DL (ref 31.5–35.7)
MCV RBC AUTO: 88.4 FL (ref 79–97)
MONOCYTES # BLD AUTO: 1.08 10*3/MM3 (ref 0.1–0.9)
MONOCYTES NFR BLD AUTO: 16.5 % (ref 5–12)
NEUTROPHILS NFR BLD AUTO: 3.76 10*3/MM3 (ref 1.7–7)
NEUTROPHILS NFR BLD AUTO: 57.2 % (ref 42.7–76)
NRBC BLD AUTO-RTO: 0 /100 WBC (ref 0–0.2)
PLATELET # BLD AUTO: 219 10*3/MM3 (ref 140–450)
PMV BLD AUTO: 9.4 FL (ref 6–12)
POTASSIUM SERPL-SCNC: 3.5 MMOL/L (ref 3.5–5.2)
RBC # BLD AUTO: 4.22 10*6/MM3 (ref 4.14–5.8)
S ENT+BONG DNA STL QL NAA+NON-PROBE: NOT DETECTED
SHIGELLA SP+EIEC IPAH ST NAA+NON-PROBE: NOT DETECTED
SODIUM SERPL-SCNC: 137 MMOL/L (ref 136–145)
WBC NRBC COR # BLD AUTO: 6.56 10*3/MM3 (ref 3.4–10.8)

## 2024-05-03 PROCEDURE — 74177 CT ABD & PELVIS W/CONTRAST: CPT

## 2024-05-03 PROCEDURE — 25010000002 ENOXAPARIN PER 10 MG: Performed by: INTERNAL MEDICINE

## 2024-05-03 PROCEDURE — 74019 RADEX ABDOMEN 2 VIEWS: CPT

## 2024-05-03 PROCEDURE — 83735 ASSAY OF MAGNESIUM: CPT | Performed by: INTERNAL MEDICINE

## 2024-05-03 PROCEDURE — 99232 SBSQ HOSP IP/OBS MODERATE 35: CPT | Performed by: INTERNAL MEDICINE

## 2024-05-03 PROCEDURE — 25510000001 IOPAMIDOL PER 1 ML: Performed by: INTERNAL MEDICINE

## 2024-05-03 PROCEDURE — 25010000002 HYDROCORTISONE SOD SUC (PF) 100 MG RECONSTITUTED SOLUTION: Performed by: INTERNAL MEDICINE

## 2024-05-03 PROCEDURE — 80048 BASIC METABOLIC PNL TOTAL CA: CPT | Performed by: INTERNAL MEDICINE

## 2024-05-03 PROCEDURE — 85025 COMPLETE CBC W/AUTO DIFF WBC: CPT | Performed by: INTERNAL MEDICINE

## 2024-05-03 PROCEDURE — 25010000002 MAGNESIUM SULFATE 2 GM/50ML SOLUTION: Performed by: INTERNAL MEDICINE

## 2024-05-03 PROCEDURE — 25810000003 LACTATED RINGERS PER 1000 ML: Performed by: INTERNAL MEDICINE

## 2024-05-03 PROCEDURE — 99231 SBSQ HOSP IP/OBS SF/LOW 25: CPT | Performed by: NURSE PRACTITIONER

## 2024-05-03 RX ORDER — MAGNESIUM SULFATE HEPTAHYDRATE 40 MG/ML
2 INJECTION, SOLUTION INTRAVENOUS ONCE
Status: COMPLETED | OUTPATIENT
Start: 2024-05-03 | End: 2024-05-03

## 2024-05-03 RX ADMIN — ENOXAPARIN SODIUM 40 MG: 100 INJECTION SUBCUTANEOUS at 17:36

## 2024-05-03 RX ADMIN — PANTOPRAZOLE SODIUM 40 MG: 40 INJECTION, POWDER, FOR SOLUTION INTRAVENOUS at 20:51

## 2024-05-03 RX ADMIN — SODIUM CHLORIDE, POTASSIUM CHLORIDE, SODIUM LACTATE AND CALCIUM CHLORIDE 100 ML/HR: 600; 310; 30; 20 INJECTION, SOLUTION INTRAVENOUS at 19:44

## 2024-05-03 RX ADMIN — MAGNESIUM SULFATE HEPTAHYDRATE 2 G: 40 INJECTION, SOLUTION INTRAVENOUS at 08:50

## 2024-05-03 RX ADMIN — IOPAMIDOL 100 ML: 755 INJECTION, SOLUTION INTRAVENOUS at 12:17

## 2024-05-03 RX ADMIN — HYDROCORTISONE SODIUM SUCCINATE 100 MG: 100 INJECTION, POWDER, FOR SOLUTION INTRAMUSCULAR; INTRAVENOUS at 20:52

## 2024-05-03 RX ADMIN — Medication 10 ML: at 20:52

## 2024-05-03 RX ADMIN — PANTOPRAZOLE SODIUM 40 MG: 40 INJECTION, POWDER, FOR SOLUTION INTRAVENOUS at 08:50

## 2024-05-03 RX ADMIN — SODIUM CHLORIDE, POTASSIUM CHLORIDE, SODIUM LACTATE AND CALCIUM CHLORIDE 100 ML/HR: 600; 310; 30; 20 INJECTION, SOLUTION INTRAVENOUS at 06:41

## 2024-05-03 NOTE — PROGRESS NOTES
[facility]   Hospitalist Progress Note  Date: 5/3/2024  Patient Name: Dion Espana  : 1955  MRN: [id number]  Date of admission: 2024      Subjective   Subjective     Chief Complaint:   Abdominal pain    Summary:   Dion Espana is a 69 y.o. male with a past medical history of gout, hypertension, history of prostate cancer status post radical prostatectomy, history of cholecystectomy     Patient presented the emergency department on 2024 after being evaluated by outpatient care provider.  Patient had been started with abdominal pain bloating indigestion and 1 episode of vomiting.  CT scan was performed as an outpatient showing fatty mural infiltration of the distal 15 cm of the terminal ileum suggesting chronic inflammatory bowel disease, abnormal dilation of the proximal small bowel loops consistent with partial obstruction.  Patient has been having intermittent abdominal pain, limited, for the past month.  However starting on 2024 patient started with worsening abdominal pain resulting in minimal p.o. intake.  Patient with diarrhea initially however this is resolved.  Patient with another bout of diarrhea the night before presenting to the hospital on 2024.  Patient had 1 episode of emesis, bilious nonbloody on 2024.  Prior to this recent episode patient denies any changes in his bowel habits, no weight loss.  Patient was sent to the emergency department and admitted for further care and management.       Interval Followup:   NG tube remains in place with bilious output.  Patient passing flatus however no bowel movements.  Patient currently hungry.  Denies any further abdominal pain.  Abdomen on exam today soft      Objective   Objective     Vitals:   Temp:  [97.7 °F (36.5 °C)-99.7 °F (37.6 °C)] 97.7 °F (36.5 °C)  Heart Rate:  [55-72] 64  Resp:  [16-20] 20  BP: (124-164)/(61-83) 140/73  Physical Exam               Constitutional: Awake, alert, no acute distress               Eyes: Pupils equal, sclerae anicteric, no conjunctival injection              HENT: NCAT, mucous membranes moist              Neck: Supple, no thyromegaly, no lymphadenopathy, trachea midline              Respiratory: Clear to auscultation bilaterally, nonlabored respirations               Cardiovascular: RRR, no murmurs, rubs, or gallops, palpable pedal pulses bilaterally              Gastrointestinal: Positive bowel sounds, softer than yesterday, distended, no tenderness               Musculoskeletal: No bilateral ankle edema, no clubbing or cyanosis to extremities              Psychiatric: Appropriate affect, cooperative              Neurologic: Oriented x 3, strength symmetric in all extremities, Cranial Nerves grossly intact to confrontation, speech clear              Skin: No rashes     Result Review    Result Review:  I have personally reviewed the results from 5/3/2024 and agree with these findings:  []  Laboratory  []  Microbiology  []  Radiology  []  EKG/Telemetry   []  Cardiology/Vascular   []  Pathology  []  Old records  []  Other:    Assessment & Plan   Assessment / Plan     Assessment/Plan:  Small bowel obstruction  Fatty marrow infiltration of the distal 15 cm of the terminal ileum suggesting chronic inflammatory bowel disease  Gout  Hypertension  History of prostate cancer status post radical prostatectomy     Plan:  Patient remains admitted to hospital for further care and management  General surgery consulted in the emergency department, appreciate assistance  Gastroenterology consulted for concern of inflammatory bowel disease on imaging  Remains n.p.o. except ice chips, NG tube in place with bilious output  Discussed with gastroenterologist additional CT scan ordered of abdomen  Patient will continue with IV fluids  Symptomatic management for pain or nausea available  Patient is having flatus however no bowel movements  Patient's magnesium low requiring IV supplementation today     Discussed  plan with RN, gastroenterologist    DVT prophylaxis:  Medical DVT prophylaxis orders are present.        CODE STATUS:   Code Status (Patient has no pulse and is not breathing): CPR (Attempt to Resuscitate)  Medical Interventions (Patient has pulse or is breathing): Full Support

## 2024-05-03 NOTE — SIGNIFICANT NOTE
05/03/24 1609   Plan   Plan CCM, RN met with patient and spouse to introduce self and assess possible discharge needs.  Patient lives with spouse that provide good support.  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  Reports no financial needs.  PCP and facesheet verified.  Patient plans to return home with spouse and no needs at this time.

## 2024-05-03 NOTE — PLAN OF CARE
Problem: Adult Inpatient Plan of Care  Goal: Plan of Care Review  Outcome: Ongoing, Progressing  Flowsheets (Taken 5/3/2024 1640)  Outcome Evaluation: VSS. Ng removed per surgery team. CT Enterography completed. tolerating clears  Goal: Patient-Specific Goal (Individualized)  Outcome: Ongoing, Progressing  Goal: Absence of Hospital-Acquired Illness or Injury  Outcome: Ongoing, Progressing  Intervention: Identify and Manage Fall Risk  Recent Flowsheet Documentation  Taken 5/3/2024 0830 by Estela Harris RN  Safety Promotion/Fall Prevention: safety round/check completed  Intervention: Prevent Skin Injury  Recent Flowsheet Documentation  Taken 5/3/2024 0830 by Estela Harris RN  Body Position: position changed independently  Intervention: Prevent and Manage VTE (Venous Thromboembolism) Risk  Recent Flowsheet Documentation  Taken 5/3/2024 0830 by Estela Harris RN  Activity Management: activity encouraged  Goal: Optimal Comfort and Wellbeing  Outcome: Ongoing, Progressing  Intervention: Provide Person-Centered Care  Recent Flowsheet Documentation  Taken 5/3/2024 0830 by Estela Harris RN  Trust Relationship/Rapport:   care explained   choices provided  Goal: Readiness for Transition of Care  Outcome: Ongoing, Progressing     Problem: Fluid Deficit (Intestinal Obstruction)  Goal: Fluid Balance  Outcome: Ongoing, Progressing     Problem: Infection (Intestinal Obstruction)  Goal: Absence of Infection Signs and Symptoms  Outcome: Ongoing, Progressing     Problem: Nausea and Vomiting (Intestinal Obstruction)  Goal: Nausea and Vomiting Relief  Outcome: Ongoing, Progressing     Problem: Pain (Intestinal Obstruction)  Goal: Acceptable Pain Control  Outcome: Ongoing, Progressing     Problem: Hypertension Comorbidity  Goal: Blood Pressure in Desired Range  Outcome: Ongoing, Progressing     Problem: Osteoarthritis Comorbidity  Goal: Maintenance of Osteoarthritis Symptom Control  Outcome: Ongoing,  Progressing  Intervention: Maintain Osteoarthritis Symptom Control  Recent Flowsheet Documentation  Taken 5/3/2024 1341 by Estela Harris, RN  Activity Management: activity encouraged   Goal Outcome Evaluation:              Outcome Evaluation: VSS. Ng removed per surgery team. CT Enterography completed. tolerating clears

## 2024-05-03 NOTE — SIGNIFICANT NOTE
05/03/24 0945   Coping/Psychosocial   Observed Emotional State calm;cooperative   Verbalized Emotional State relief;hopefulness   Trust Relationship/Rapport empathic listening provided   Involvement in Care interacting with patient   Additional Documentation Spiritual Care (Group)   Spiritual Care   Use of Spiritual Resources non-Roman Catholic use of spiritual care   Spiritual Care Source  initiative   Spiritual Care Follow-Up follow-up, none required as presently assessed   Response to Spiritual Care receptive of support;thanks expressed   Spiritual Care Interventions supportive conversation provided   Spiritual Care Visit Type initial   Receptivity to Spiritual Care visit welcomed

## 2024-05-03 NOTE — PLAN OF CARE
Goal Outcome Evaluation:  Plan of Care Reviewed With: patient        Progress: no change  Outcome Evaluation: VSS. No complaint of pain. Chloraseptic spray given for throat discomfort. Passing flatus per patient. No bowel movement. NG tube to intemittent LWS. Had 450 mL out of NG tube.

## 2024-05-03 NOTE — PROGRESS NOTES
"PROGRESS NOTE     Patient Name:  Dion Espana  YOB: 1955  [id number]   LOS: 1 day   * No surgery found *  Patient Care Team:  Kenia Mclean APRN as PCP - General (Nurse Practitioner)  Silvana Alonso MD as Consulting Physician (Urology)        Subjective     Interval History: VSS, afebrile, no abdominal pain, +flatus, feels much less bloated today and is hungry      Review of Systems:      A complete review of systems was performed and all are negative except what is documented in the HPI.       Objective         Physical Exam:     Constitutional:  well nourished, no acute distress, appears stated age /75   Pulse 75   Temp 97.9 °F (36.6 °C)   Resp 20   Ht 180.3 cm (71\")   Wt 104 kg (229 lb 0.9 oz)   SpO2 91%   BMI 31.95 kg/m²    Eyes:  anicteric sclerae, moist conjunctivae, no lid lag, PERRLA  ENMT:  oropharynx clear, moist mucous membranes, good dentition  Neck:   full ROM, trachea midline, no thyromegaly  Cardiovascular: RRR, S1 and S2 present, no MRG, heart rate 75, no pedal edema  Respiratory: lungs CTA, respirations even and unlabored  GI:  Abdomen soft, nontender, nondistended, no HSM     Skin:  warm and dry, normal turgor, no rashes  Psychiatric:  alert and oriented x3, intact judgment and insight, cooperative    Results Review:      I reviewed the patient's new clinical results including CBC, BMP.  LABS:  WBC   Date Value Ref Range Status   05/03/2024 6.56 3.40 - 10.80 10*3/mm3 Final     RBC   Date Value Ref Range Status   05/03/2024 4.22 4.14 - 5.80 10*6/mm3 Final     Hemoglobin   Date Value Ref Range Status   05/03/2024 12.5 (L) 13.0 - 17.7 g/dL Final     Hematocrit   Date Value Ref Range Status   05/03/2024 37.3 (L) 37.5 - 51.0 % Final     MCV   Date Value Ref Range Status   05/03/2024 88.4 79.0 - 97.0 fL Final     MCH   Date Value Ref Range Status   05/03/2024 29.6 26.6 - 33.0 pg Final     MCHC   Date Value Ref Range Status   05/03/2024 33.5 31.5 - 35.7 g/dL " Final     RDW   Date Value Ref Range Status   05/03/2024 13.1 12.3 - 15.4 % Final     RDW-SD   Date Value Ref Range Status   05/03/2024 42.5 37.0 - 54.0 fl Final     MPV   Date Value Ref Range Status   05/03/2024 9.4 6.0 - 12.0 fL Final     Platelets   Date Value Ref Range Status   05/03/2024 219 140 - 450 10*3/mm3 Final     Neutrophil %   Date Value Ref Range Status   05/03/2024 57.2 42.7 - 76.0 % Final     Lymphocyte %   Date Value Ref Range Status   05/03/2024 22.6 19.6 - 45.3 % Final     Monocyte %   Date Value Ref Range Status   05/03/2024 16.5 (H) 5.0 - 12.0 % Final     Eosinophil %   Date Value Ref Range Status   05/03/2024 2.7 0.3 - 6.2 % Final     Basophil %   Date Value Ref Range Status   05/03/2024 0.5 0.0 - 1.5 % Final     Immature Grans %   Date Value Ref Range Status   05/03/2024 0.5 0.0 - 0.5 % Final     Neutrophils, Absolute   Date Value Ref Range Status   05/03/2024 3.76 1.70 - 7.00 10*3/mm3 Final     Lymphocytes, Absolute   Date Value Ref Range Status   05/03/2024 1.48 0.70 - 3.10 10*3/mm3 Final     Monocytes, Absolute   Date Value Ref Range Status   05/03/2024 1.08 (H) 0.10 - 0.90 10*3/mm3 Final     Eosinophils, Absolute   Date Value Ref Range Status   05/03/2024 0.18 0.00 - 0.40 10*3/mm3 Final     Basophils, Absolute   Date Value Ref Range Status   05/03/2024 0.03 0.00 - 0.20 10*3/mm3 Final     Immature Grans, Absolute   Date Value Ref Range Status   05/03/2024 0.03 0.00 - 0.05 10*3/mm3 Final     nRBC   Date Value Ref Range Status   05/03/2024 0.0 0.0 - 0.2 /100 WBC Final         Basic Metabolic Panel    Sodium Sodium   Date Value Ref Range Status   05/03/2024 137 136 - 145 mmol/L Final   05/02/2024 137 136 - 145 mmol/L Final      Potassium Potassium   Date Value Ref Range Status   05/03/2024 3.5 3.5 - 5.2 mmol/L Final   05/02/2024 4.0 3.5 - 5.2 mmol/L Final      Chloride Chloride   Date Value Ref Range Status   05/03/2024 103 98 - 107 mmol/L Final   05/02/2024 102 98 - 107 mmol/L Final     "  Bicarbonate No results found for: \"PLASMABICARB\"   BUN BUN   Date Value Ref Range Status   05/03/2024 21 8 - 23 mg/dL Final   05/02/2024 24 (H) 8 - 23 mg/dL Final      Creatinine Creatinine   Date Value Ref Range Status   05/03/2024 0.85 0.76 - 1.27 mg/dL Final   05/02/2024 1.09 0.76 - 1.27 mg/dL Final      Calcium Calcium   Date Value Ref Range Status   05/03/2024 8.0 (L) 8.6 - 10.5 mg/dL Final   05/02/2024 9.3 8.6 - 10.5 mg/dL Final      Glucose      No components found for: \"GLUCOSE.*\"         IMAGING:  Imaging Results (Last 72 Hours)       Procedure Component Value Units Date/Time    CT Enterography Abdomen Pelvis w Contrast [[id number]] Resulted: 05/03/24 1221     Updated: 05/03/24 1222    XR Abdomen Flat & Upright [[id number]] Collected: 05/03/24 0515     Updated: 05/03/24 0518    Narrative:      XR ABDOMEN FLAT AND UPRIGHT-: 5/3/2024 5:00 AM     INDICATION:  Bowel obstruction.     COMPARISON:  5/2/2024.     FINDINGS:  Upright and supine AP radiographs of the abdomen.     No free air on the upright view. NG tube tip is in the stomach.  Cholecystectomy clips are present. There is still some gas-filled large  and small bowel loops, but the small bowel overall appears improved.  Lumbar degenerative disease is present.       Impression:      No free air identified. Small bowel distention is slightly improved in  the interval.     Electronically Signed By-Dr. Eddie Rowley MD On:5/3/2024 5:16 AM       XR Abdomen KUB [[id number]] Collected: 05/02/24 1613     Updated: 05/02/24 1620    Narrative:      XR ABDOMEN KUB-     Date of Exam: 5/2/2024 4:05 PM     Indication: NG tube adjustment; K56.600-Partial intestinal obstruction,  unspecified as to cause; R11.2-Nausea with vomiting, unspecified     Comparison: 5/2/2024     Findings:  The tip of the NG tube is in the stomach. The sideport is at the GE  junction. Visualized small bowel loops are dilated. The stomach does not  appear to be significantly distended. The lung " bases are clear.       Impression:      Impression:  NG tube tip in the stomach. Side port at the GE junction. Suggest  advancing 5 cm.        Electronically Signed By-ZINA REYNA MD On:5/2/2024 4:18 PM       XR Abdomen KUB [[id number]] Collected: 05/02/24 1516     Updated: 05/02/24 1519    Narrative:      XR ABDOMEN KUB-     Date of Exam: 5/2/2024 3:00 PM     Indication: NG tube placement; K56.600-Partial intestinal obstruction,  unspecified as to cause; R11.2-Nausea with vomiting, unspecified     Comparison: CT abdomen pelvis 5/2/2024     Findings:  See impression       Impression:      Impression:  Antegrade oriented gastric tube projects over the GE junction. Consider  further advancement. Mild to moderately dilated loops of bowel in  keeping with suspected partial obstruction. Cholecystectomy clips.        Electronically Signed By-Linus Rose MD On:5/2/2024 3:17 PM               Medications:    Current Facility-Administered Medications:     acetaminophen (TYLENOL) tablet 650 mg, 650 mg, Oral, Q4H PRN, Manny Quintero MD    benzocaine (HURRICAINE) 20 % liquid solution 2 spray, 2 spray, Mouth/Throat, Once, Manny Quintero MD    calcium carbonate (TUMS) chewable tablet 500 mg (200 mg elemental), 1 tablet, Oral, BID PRN, Manny Quintero MD    Enoxaparin Sodium (LOVENOX) syringe 40 mg, 40 mg, Subcutaneous, Q24H, Manny Quintero MD, 40 mg at 05/02/24 1823    lactated ringers infusion, 100 mL/hr, Intravenous, Continuous, Manny Quintero MD, Last Rate: 100 mL/hr at 05/03/24 0641, 100 mL/hr at 05/03/24 0641    metoprolol succinate XL (TOPROL-XL) 24 hr tablet 100 mg, 100 mg, Oral, Daily, Manny Quintero MD    morphine injection 2 mg, 2 mg, Intravenous, Q4H PRN, Manny Quintero MD    ondansetron (ZOFRAN) injection 4 mg, 4 mg, Intravenous, Q6H PRN, Manny Quintero MD    ondansetron ODT (ZOFRAN-ODT) disintegrating tablet 4 mg, 4 mg, Oral, Q6H PRN, Manny Quintero MD    pantoprazole (PROTONIX) injection 40 mg, 40 mg, Intravenous, Q12H,  Jaziel Garcia MD, 40 mg at 05/03/24 0850    Pharmacy to Dose enoxaparin (LOVENOX), , Does not apply, Continuous PRN, Manny Quintero MD    phenol (CHLORASEPTIC) 1.4 % liquid 1 spray, 1 spray, Mouth/Throat, Q2H PRN, Denise Sheehan PA    [COMPLETED] Insert Peripheral IV, , , Once **AND** sodium chloride 0.9 % flush 10 mL, 10 mL, Intravenous, PRN, Manny Quintero MD    sodium chloride 0.9 % flush 10 mL, 10 mL, Intravenous, Q12H, Manny Quintero MD, 10 mL at 05/02/24 2105    sodium chloride 0.9 % flush 10 mL, 10 mL, Intravenous, PRN, Manny Quintero MD    sodium chloride 0.9 % infusion 40 mL, 40 mL, Intravenous, LOU, Manny Quintero MD    Assessment & Plan       SBO (small bowel obstruction)   Clamp NG tube   Clear liquid diet   If does ok on clears later this afternoon may DC NG tube      NICOLAS Foreman  05/03/24  13:35 EDT    Electronically signed by NICOLAS Foreman, 05/03/24, 1:35 PM EDT.

## 2024-05-04 LAB
ALBUMIN SERPL-MCNC: 3.4 G/DL (ref 3.5–5.2)
ALBUMIN/GLOB SERPL: 1.2 G/DL
ALP SERPL-CCNC: 109 U/L (ref 39–117)
ALT SERPL W P-5'-P-CCNC: 10 U/L (ref 1–41)
ANION GAP SERPL CALCULATED.3IONS-SCNC: 9.2 MMOL/L (ref 5–15)
AST SERPL-CCNC: 13 U/L (ref 1–40)
BILIRUB SERPL-MCNC: 0.6 MG/DL (ref 0–1.2)
BUN SERPL-MCNC: 11 MG/DL (ref 8–23)
BUN/CREAT SERPL: 13.3 (ref 7–25)
CALCIUM SPEC-SCNC: 8.3 MG/DL (ref 8.6–10.5)
CHLORIDE SERPL-SCNC: 99 MMOL/L (ref 98–107)
CO2 SERPL-SCNC: 24.8 MMOL/L (ref 22–29)
CREAT SERPL-MCNC: 0.83 MG/DL (ref 0.76–1.27)
DEPRECATED RDW RBC AUTO: 41.5 FL (ref 37–54)
EGFRCR SERPLBLD CKD-EPI 2021: 95.3 ML/MIN/1.73
ERYTHROCYTE [DISTWIDTH] IN BLOOD BY AUTOMATED COUNT: 12.9 % (ref 12.3–15.4)
GLOBULIN UR ELPH-MCNC: 2.8 GM/DL
GLUCOSE SERPL-MCNC: 122 MG/DL (ref 65–99)
HCT VFR BLD AUTO: 36.5 % (ref 37.5–51)
HGB BLD-MCNC: 12.3 G/DL (ref 13–17.7)
MAGNESIUM SERPL-MCNC: 1.6 MG/DL (ref 1.6–2.4)
MCH RBC QN AUTO: 29.8 PG (ref 26.6–33)
MCHC RBC AUTO-ENTMCNC: 33.7 G/DL (ref 31.5–35.7)
MCV RBC AUTO: 88.4 FL (ref 79–97)
PLATELET # BLD AUTO: 241 10*3/MM3 (ref 140–450)
PMV BLD AUTO: 9.5 FL (ref 6–12)
POTASSIUM SERPL-SCNC: 3.7 MMOL/L (ref 3.5–5.2)
PROT SERPL-MCNC: 6.2 G/DL (ref 6–8.5)
RBC # BLD AUTO: 4.13 10*6/MM3 (ref 4.14–5.8)
SODIUM SERPL-SCNC: 133 MMOL/L (ref 136–145)
WBC NRBC COR # BLD AUTO: 5.97 10*3/MM3 (ref 3.4–10.8)

## 2024-05-04 PROCEDURE — 25010000002 ENOXAPARIN PER 10 MG: Performed by: INTERNAL MEDICINE

## 2024-05-04 PROCEDURE — 83735 ASSAY OF MAGNESIUM: CPT | Performed by: INTERNAL MEDICINE

## 2024-05-04 PROCEDURE — 80053 COMPREHEN METABOLIC PANEL: CPT | Performed by: INTERNAL MEDICINE

## 2024-05-04 PROCEDURE — 25010000002 HYDROCORTISONE SOD SUC (PF) 100 MG RECONSTITUTED SOLUTION: Performed by: INTERNAL MEDICINE

## 2024-05-04 PROCEDURE — 99231 SBSQ HOSP IP/OBS SF/LOW 25: CPT | Performed by: STUDENT IN AN ORGANIZED HEALTH CARE EDUCATION/TRAINING PROGRAM

## 2024-05-04 PROCEDURE — 85027 COMPLETE CBC AUTOMATED: CPT | Performed by: INTERNAL MEDICINE

## 2024-05-04 PROCEDURE — 99232 SBSQ HOSP IP/OBS MODERATE 35: CPT | Performed by: INTERNAL MEDICINE

## 2024-05-04 PROCEDURE — 83993 ASSAY FOR CALPROTECTIN FECAL: CPT | Performed by: INTERNAL MEDICINE

## 2024-05-04 PROCEDURE — 25810000003 LACTATED RINGERS PER 1000 ML: Performed by: INTERNAL MEDICINE

## 2024-05-04 RX ADMIN — Medication 10 ML: at 20:56

## 2024-05-04 RX ADMIN — ENOXAPARIN SODIUM 40 MG: 100 INJECTION SUBCUTANEOUS at 18:21

## 2024-05-04 RX ADMIN — PANTOPRAZOLE SODIUM 40 MG: 40 INJECTION, POWDER, FOR SOLUTION INTRAVENOUS at 20:56

## 2024-05-04 RX ADMIN — HYDROCORTISONE SODIUM SUCCINATE 100 MG: 100 INJECTION, POWDER, FOR SOLUTION INTRAMUSCULAR; INTRAVENOUS at 04:00

## 2024-05-04 RX ADMIN — HYDROCORTISONE SODIUM SUCCINATE 100 MG: 100 INJECTION, POWDER, FOR SOLUTION INTRAMUSCULAR; INTRAVENOUS at 20:56

## 2024-05-04 RX ADMIN — HYDROCORTISONE SODIUM SUCCINATE 100 MG: 100 INJECTION, POWDER, FOR SOLUTION INTRAMUSCULAR; INTRAVENOUS at 11:13

## 2024-05-04 RX ADMIN — METOPROLOL SUCCINATE 100 MG: 50 TABLET, EXTENDED RELEASE ORAL at 08:40

## 2024-05-04 RX ADMIN — PANTOPRAZOLE SODIUM 40 MG: 40 INJECTION, POWDER, FOR SOLUTION INTRAVENOUS at 08:40

## 2024-05-04 RX ADMIN — SODIUM CHLORIDE, POTASSIUM CHLORIDE, SODIUM LACTATE AND CALCIUM CHLORIDE 100 ML/HR: 600; 310; 30; 20 INJECTION, SOLUTION INTRAVENOUS at 04:00

## 2024-05-04 RX ADMIN — Medication 10 ML: at 08:42

## 2024-05-04 NOTE — PROGRESS NOTES
[facility]   Hospitalist Progress Note  Date: 2024  Patient Name: Dion Espana  : 1955  MRN: [id number]  Date of admission: 2024      Subjective   Subjective     Chief Complaint:   Abdominal pain    Summary:   Dion Espana is a 69 y.o. male with a past medical history of gout, hypertension, history of prostate cancer status post radical prostatectomy, history of cholecystectomy     Patient presented the emergency department on 2024 after being evaluated by outpatient care provider.  Patient had been started with abdominal pain bloating indigestion and 1 episode of vomiting.  CT scan was performed as an outpatient showing fatty mural infiltration of the distal 15 cm of the terminal ileum suggesting chronic inflammatory bowel disease, abnormal dilation of the proximal small bowel loops consistent with partial obstruction.  Patient has been having intermittent abdominal pain, limited, for the past month.  However starting on 2024 patient started with worsening abdominal pain resulting in minimal p.o. intake.  Patient with diarrhea initially however this is resolved.  Patient with another bout of diarrhea the night before presenting to the hospital on 2024.  Patient had 1 episode of emesis, bilious nonbloody on 2024.  Prior to this recent episode patient denies any changes in his bowel habits, no weight loss.  Patient was sent to the emergency department and admitted for further care and management.     Interval Followup:   NG tube has been removed.  Patient had a bowel movement.  Asking for something to eat.  Discussing with gastroenterologist yesterday based on CT enterography concern is raised for IBD, therefore started on steroids.      Objective   Objective     Vitals:   Temp:  [97.2 °F (36.2 °C)-99 °F (37.2 °C)] 97.2 °F (36.2 °C)  Heart Rate:  [66-81] 66  Resp:  [16-18] 16  BP: (129-153)/(72-85) 153/85  Physical Exam               Constitutional: Awake, alert, no  acute distress              Eyes: Pupils equal, sclerae anicteric, no conjunctival injection              HENT: NCAT, mucous membranes moist              Neck: Supple, no thyromegaly, no lymphadenopathy, trachea midline              Respiratory: Clear to auscultation bilaterally, nonlabored respirations               Cardiovascular: RRR, no murmurs, rubs, or gallops, palpable pedal pulses bilaterally              Gastrointestinal: Positive bowel sounds, soft, nondistended, nontender              Musculoskeletal: No bilateral ankle edema, no clubbing or cyanosis to extremities              Psychiatric: Appropriate affect, cooperative              Neurologic: Oriented x 3, strength symmetric in all extremities, Cranial Nerves grossly intact to confrontation, speech clear              Skin: No rashes     Result Review    Result Review:  I have personally reviewed the results from 5/4/2024 and agree with these findings:  []  Laboratory  []  Microbiology  []  Radiology  []  EKG/Telemetry   []  Cardiology/Vascular   []  Pathology  []  Old records  []  Other:    Assessment & Plan   Assessment / Plan     Assessment/Plan:  Small bowel obstruction  Fatty marrow infiltration of the distal 15 cm of the terminal ileum suggesting chronic inflammatory bowel disease  History of gout  Hypertension  History of prostate cancer status post radical prostatectomy     Plan:  Patient remains admitted to hospital for further care and management  General surgery consulted in the emergency department, appreciate assistance  Gastroenterology consulted for concern of inflammatory bowel disease on imaging  NG tube has been removed, advanced on diet  CT enterography raises concern for IBD therefore started on steroids following discussion with gastroenterologist  Patient will likely discharge with steroid taper, will need close follow-up with GI as an outpatient for colonoscopy  Symptomatic management for pain or nausea available     Discussed  plan with RN, general surgeon    DVT prophylaxis:  Medical DVT prophylaxis orders are present.        CODE STATUS:   Code Status (Patient has no pulse and is not breathing): CPR (Attempt to Resuscitate)  Medical Interventions (Patient has pulse or is breathing): Full Support

## 2024-05-04 NOTE — PROGRESS NOTES
[facility]     Surgery Progress Note    Patient Name: Dion Espana  :    1955  MRN:    [id number]  Date of admission:  2024  Length of Stay: 2 days    Subjective   69-year-old male with small bowel obstruction    No acute events overnight.  Denies any abdominal pain, nausea, vomiting, fevers, chills.  Tolerating clears and asking for something more substantial to eat.  Passing flatus and had a bowel movement this morning.  Objective     Current Facility-Administered Medications:     acetaminophen (TYLENOL) tablet 650 mg, 650 mg, Oral, Q4H PRN, Manny Quintero MD    benzocaine (HURRICAINE) 20 % liquid solution 2 spray, 2 spray, Mouth/Throat, Once, Manny Quintero MD    calcium carbonate (TUMS) chewable tablet 500 mg (200 mg elemental), 1 tablet, Oral, BID PRN, Manny Quintero MD    Enoxaparin Sodium (LOVENOX) syringe 40 mg, 40 mg, Subcutaneous, Q24H, Manny Quintero MD, 40 mg at 24 1736    Hydrocortisone Sod Suc (PF) (Solu-CORTEF) injection 100 mg, 100 mg, Intravenous, Q8H, Luis Pate MD, 100 mg at 24 0400    lactated ringers infusion, 100 mL/hr, Intravenous, Continuous, Manny Quintero MD, Last Rate: 100 mL/hr at 24 0400, 100 mL/hr at 24 0400    metoprolol succinate XL (TOPROL-XL) 24 hr tablet 100 mg, 100 mg, Oral, Daily, Manny Quintero MD, 100 mg at 24 0840    morphine injection 2 mg, 2 mg, Intravenous, Q4H PRN, Manny Quintero MD    ondansetron (ZOFRAN) injection 4 mg, 4 mg, Intravenous, Q6H PRN, Manny Quintero MD    ondansetron ODT (ZOFRAN-ODT) disintegrating tablet 4 mg, 4 mg, Oral, Q6H PRN, Manny Quintero MD    pantoprazole (PROTONIX) injection 40 mg, 40 mg, Intravenous, Q12H, Jaziel Garcia MD, 40 mg at 24 0840    Pharmacy to Dose enoxaparin (LOVENOX), , Does not apply, Continuous PRN, Manny Quintero MD    phenol (CHLORASEPTIC) 1.4 % liquid 1 spray, 1 spray, Mouth/Throat, Q2H PRN, Denise Sheehan PA    [COMPLETED] Insert Peripheral IV, , , Once **AND** sodium  chloride 0.9 % flush 10 mL, 10 mL, Intravenous, PRN, Manny Quintero MD    sodium chloride 0.9 % flush 10 mL, 10 mL, Intravenous, Q12H, Manny Quintero MD, 10 mL at 05/04/24 0842    sodium chloride 0.9 % flush 10 mL, 10 mL, Intravenous, PRN, Manny Quintero MD    sodium chloride 0.9 % infusion 40 mL, 40 mL, Intravenous, IVORYN, Manny Quintero MD    Current Diet:    Dietary Orders (From admission, onward)       Start     Ordered    05/03/24 1419  Diet: Liquid; Clear Liquid; Fluid Consistency: Thin (IDDSI 0)  Diet Effective Now        References:    Diet Order Crosswalk   Question Answer Comment   Diets: Liquid    Liquid Diet: Clear Liquid    Fluid Consistency: Thin (IDDSI 0)        05/03/24 1418                    Vitals:   Temp:  [97.9 °F (36.6 °C)-99 °F (37.2 °C)] 98.1 °F (36.7 °C)  Heart Rate:  [74-81] 75  Resp:  [18] 18  BP: (129-162)/(72-82) 140/78  Physical Exam   General: no acute distress, resting in bed  Respiratory:  non-labored breathing  Cardiovascular:  RRR, non-tachycardic  Abdomen:  soft, non-distended, non-tender  Neurologic:  AAO x 3, no gross deficits  Psychiatric:  appropriate mood and affect    LABS:  CBC          5/2/2024    10:54 5/3/2024    04:03 5/4/2024    04:44   CBC   WBC 8.42  6.56  5.97    RBC 5.00  4.22  4.13    Hemoglobin 14.7  12.5  12.3    Hematocrit 44.7  37.3  36.5    MCV 89.4  88.4  88.4    MCH 29.4  29.6  29.8    MCHC 32.9  33.5  33.7    RDW 13.4  13.1  12.9    Platelets 297  219  241      BMP          5/2/2024    10:54 5/3/2024    04:03 5/4/2024    04:44   BMP   BUN 24  21  11    Creatinine 1.09  0.85  0.83    Sodium 137  137  133    Potassium 4.0  3.5  3.7    Chloride 102  103  99    CO2 25.5  24.7  24.8    Calcium 9.3  8.0  8.3      CMP          5/2/2024    10:54 5/3/2024    04:03 5/4/2024    04:44   CMP   Glucose 87  84  122    BUN 24  21  11    Creatinine 1.09  0.85  0.83    EGFR 73.9  94.6  95.3    Sodium 137  137  133    Potassium 4.0  3.5  3.7    Chloride 102  103  99    Calcium 9.3   8.0  8.3    Total Protein 7.1   6.2    Albumin 3.8   3.4    Globulin 3.3   2.8    Total Bilirubin 1.2   0.6    Alkaline Phosphatase 128   109    AST (SGOT) 15   13    ALT (SGPT) 12   10    Albumin/Globulin Ratio 1.2   1.2    BUN/Creatinine Ratio 22.0  24.7  13.3    Anion Gap 9.5  9.3  9.2        Lab Results (last 24 hours)       Procedure Component Value Units Date/Time    Calprotectin, Fecal - Stool, Per Rectum [[id number]] Collected: 05/04/24 0926    Specimen: Stool from Per Rectum Updated: 05/04/24 0949    Magnesium [[id number]]  (Normal) Collected: 05/04/24 0444    Specimen: Blood Updated: 05/04/24 0647     Magnesium 1.6 mg/dL     Comprehensive Metabolic Panel [[id number]]  (Abnormal) Collected: 05/04/24 0444    Specimen: Blood Updated: 05/04/24 0626     Glucose 122 mg/dL      BUN 11 mg/dL      Creatinine 0.83 mg/dL      Sodium 133 mmol/L      Potassium 3.7 mmol/L      Chloride 99 mmol/L      CO2 24.8 mmol/L      Calcium 8.3 mg/dL      Total Protein 6.2 g/dL      Albumin 3.4 g/dL      ALT (SGPT) 10 U/L      AST (SGOT) 13 U/L      Alkaline Phosphatase 109 U/L      Total Bilirubin 0.6 mg/dL      Globulin 2.8 gm/dL      A/G Ratio 1.2 g/dL      BUN/Creatinine Ratio 13.3     Anion Gap 9.2 mmol/L      eGFR 95.3 mL/min/1.73     Narrative:      GFR Normal >60  Chronic Kidney Disease <60  Kidney Failure <15      CBC (No Diff) [[id number]]  (Abnormal) Collected: 05/04/24 0444    Specimen: Blood Updated: 05/04/24 0538     WBC 5.97 10*3/mm3      RBC 4.13 10*6/mm3      Hemoglobin 12.3 g/dL      Hematocrit 36.5 %      MCV 88.4 fL      MCH 29.8 pg      MCHC 33.7 g/dL      RDW 12.9 %      RDW-SD 41.5 fl      MPV 9.5 fL      Platelets 241 10*3/mm3             IMAGING:  Imaging Results (Last 24 Hours)       Procedure Component Value Units Date/Time    CT Enterography Abdomen Pelvis w Contrast [[id number]] Collected: 05/03/24 1338     Updated: 05/03/24 1351    Narrative:      CT ABDOMEN PELVIS W CONTRAST ENTEROGRAPHY-     Date of  Exam: 5/3/2024 12:05 PM     Indication: Terminal Ileitis on CT without contrast. Evaluation for IBD;  K56.600-Partial intestinal obstruction, unspecified as to cause;  R11.2-Nausea with vomiting, unspecified.     Comparison: Abdominal radiograph from earlier today and CT abdomen  pelvis from May 2, 2024     Technique: Axial CT images were obtained of the abdomen and pelvis after  the uneventful intravenous administration of 100 mL Isovue-370.Negative  oral contrast was also administered for the enterography protocol.   Reconstructed coronal and sagittal images were also obtained. Automated  exposure control and iterative construction methods were used.        Findings:  Within the lung bases is mild bibasilar atelectasis.     The liver, adrenal glands, kidneys, spleen, and pancreas are  unremarkable. The gallbladder is surgically absent.     An NG tube has its tip in the stomach. The stomach is moderately  distended. Submucosal fat with wall thickening of the terminal ileum  appears similar to prior exam, with several mildly dilated more proximal  small bowel loops also similar to recent prior CT. The appendix is not  well-visualized. There is no ascites or loculated collection. No  abnormally enlarged lymph nodes are identified.     The rectum and urinary bladder are unremarkable. The prostate is absent.        There is a stable prominent sclerotic lesion involving the T11 vertebral  body.       Impression:      Impression:  1.  Suggestion of terminal ileitis with more proximal partial small  bowel obstruction appears similar to recent prior CT.  2.  Stable sclerotic lesion involving T11 vertebral body, which could  represent a bone island or sclerotic metastasis given history of  prostate cancer.           Electronically Signed By-Farhat Diaz MD On:5/3/2024 1:55 PM               [x]  Laboratory  []  Microbiology  []  Radiology  []  EKG/Telemetry   []  Cardiology/Vascular   []  Pathology  []  Old  records    Assessment / Plan   Assessment:   Active Hospital Problems    Diagnosis     **SBO (small bowel obstruction)          Plan:    Small bowel obstruction  -Afebrile, vital stable, no leukocytosis  -Patient with full return of bowel function and benign abdomen  -Okay to advance diet as tolerated  -No plans for surgical intervention  -Okay for discharge tomorrow if tolerating regular diet     Electronically signed by Henrique Yeboah MD, 05/04/24, 9:51 AM EDT.

## 2024-05-04 NOTE — PLAN OF CARE
Goal Outcome Evaluation:  Plan of Care Reviewed With: patient        Progress: improving  Outcome Evaluation: VSS. No complaint of pain. Tolerating diet. No nausea. Up ad selma in room. Passing flatus. No bowel movement.

## 2024-05-05 ENCOUNTER — READMISSION MANAGEMENT (OUTPATIENT)
Dept: CALL CENTER | Facility: HOSPITAL | Age: 69
End: 2024-05-05
Payer: MEDICARE

## 2024-05-05 VITALS
BODY MASS INDEX: 32.07 KG/M2 | WEIGHT: 229.06 LBS | TEMPERATURE: 98.2 F | RESPIRATION RATE: 16 BRPM | HEIGHT: 71 IN | HEART RATE: 60 BPM | DIASTOLIC BLOOD PRESSURE: 70 MMHG | OXYGEN SATURATION: 92 % | SYSTOLIC BLOOD PRESSURE: 141 MMHG

## 2024-05-05 LAB
ANION GAP SERPL CALCULATED.3IONS-SCNC: 9.9 MMOL/L (ref 5–15)
BUN SERPL-MCNC: 17 MG/DL (ref 8–23)
BUN/CREAT SERPL: 19.3 (ref 7–25)
CALCIUM SPEC-SCNC: 8.3 MG/DL (ref 8.6–10.5)
CHLORIDE SERPL-SCNC: 103 MMOL/L (ref 98–107)
CO2 SERPL-SCNC: 25.1 MMOL/L (ref 22–29)
CREAT SERPL-MCNC: 0.88 MG/DL (ref 0.76–1.27)
DEPRECATED RDW RBC AUTO: 40.5 FL (ref 37–54)
EGFRCR SERPLBLD CKD-EPI 2021: 93.7 ML/MIN/1.73
ERYTHROCYTE [DISTWIDTH] IN BLOOD BY AUTOMATED COUNT: 12.9 % (ref 12.3–15.4)
GLUCOSE SERPL-MCNC: 122 MG/DL (ref 65–99)
HCT VFR BLD AUTO: 34.1 % (ref 37.5–51)
HGB BLD-MCNC: 11.6 G/DL (ref 13–17.7)
MAGNESIUM SERPL-MCNC: 1.6 MG/DL (ref 1.6–2.4)
MCH RBC QN AUTO: 29.8 PG (ref 26.6–33)
MCHC RBC AUTO-ENTMCNC: 34 G/DL (ref 31.5–35.7)
MCV RBC AUTO: 87.7 FL (ref 79–97)
PLATELET # BLD AUTO: 224 10*3/MM3 (ref 140–450)
PMV BLD AUTO: 9.1 FL (ref 6–12)
POTASSIUM SERPL-SCNC: 3.9 MMOL/L (ref 3.5–5.2)
RBC # BLD AUTO: 3.89 10*6/MM3 (ref 4.14–5.8)
SODIUM SERPL-SCNC: 138 MMOL/L (ref 136–145)
WBC NRBC COR # BLD AUTO: 7.2 10*3/MM3 (ref 3.4–10.8)

## 2024-05-05 PROCEDURE — 25010000002 HYDROCORTISONE SOD SUC (PF) 100 MG RECONSTITUTED SOLUTION: Performed by: INTERNAL MEDICINE

## 2024-05-05 PROCEDURE — 83735 ASSAY OF MAGNESIUM: CPT | Performed by: INTERNAL MEDICINE

## 2024-05-05 PROCEDURE — 99239 HOSP IP/OBS DSCHRG MGMT >30: CPT | Performed by: INTERNAL MEDICINE

## 2024-05-05 PROCEDURE — 85027 COMPLETE CBC AUTOMATED: CPT | Performed by: INTERNAL MEDICINE

## 2024-05-05 PROCEDURE — 80048 BASIC METABOLIC PNL TOTAL CA: CPT | Performed by: INTERNAL MEDICINE

## 2024-05-05 RX ORDER — PREDNISONE 20 MG/1
TABLET ORAL
Qty: 21 TABLET | Refills: 0 | Status: SHIPPED | OUTPATIENT
Start: 2024-05-05 | End: 2024-05-23

## 2024-05-05 RX ADMIN — METOPROLOL SUCCINATE 100 MG: 50 TABLET, EXTENDED RELEASE ORAL at 09:50

## 2024-05-05 RX ADMIN — Medication 10 ML: at 09:50

## 2024-05-05 RX ADMIN — HYDROCORTISONE SODIUM SUCCINATE 100 MG: 100 INJECTION, POWDER, FOR SOLUTION INTRAMUSCULAR; INTRAVENOUS at 04:01

## 2024-05-05 RX ADMIN — PANTOPRAZOLE SODIUM 40 MG: 40 INJECTION, POWDER, FOR SOLUTION INTRAVENOUS at 09:49

## 2024-05-05 NOTE — PLAN OF CARE
Goal Outcome Evaluation:  Plan of Care Reviewed With: patient, spouse        Progress: no change          Patient is discharging home. VSS, no complaints of pain or nausea. Tolerating diet and activity well. RAMSES, RN

## 2024-05-05 NOTE — PLAN OF CARE
Goal Outcome Evaluation:  Plan of Care Reviewed With: patient        Progress: no change  Outcome Evaluation: VSS. No complaint of pain. Tolerating diet. Up ad selma in room.

## 2024-05-05 NOTE — DISCHARGE SUMMARY
[facility]         HOSPITALIST  DISCHARGE SUMMARY    Patient Name: Dion Espana  : 1955  MRN: [id number]    Date of Admission: 2024  Date of Discharge:  2024  Primary Care Physician: Kenia Mclean APRN    Consults:  General surgery  Gastroenterology    Active and Resolved Hospital Problems:  Small bowel obstruction  Fatty marrow infiltration of the distal 15 cm of the terminal ileum suggesting chronic inflammatory bowel disease  History of gout  Hypertension  History of prostate cancer status post radical prostatectomy      Hospital Course     Hospital Course:  Dion Espana is a 69 y.o. male with a past medical history of gout, hypertension, history of prostate cancer status post radical prostatectomy, history of cholecystectomy.     Patient presented the emergency department on 2024 after being evaluated by outpatient care provider.  Patient had been started with abdominal pain bloating indigestion and 1 episode of vomiting.  CT scan was performed as an outpatient showing fatty mural infiltration of the distal 15 cm of the terminal ileum suggesting chronic inflammatory bowel disease, abnormal dilation of the proximal small bowel loops consistent with partial obstruction.  Patient has been having intermittent abdominal pain, limited, for the past month.  However starting on 2024 patient started with worsening abdominal pain resulting in minimal p.o. intake.  Patient with diarrhea initially however this is resolved.  Patient with another bout of diarrhea the night before presenting to the hospital on 2024.  Patient had 1 episode of emesis, bilious nonbloody on 2024.  Prior to this recent episode patient denies any changes in his bowel habits, no weight loss.  Patient was sent to the emergency department and admitted for further care and management.  Patient initially started with NG tube and bowel rest.  Following resolution of symptoms NG tube  removed and slowly advanced on diet, positive for bowel movements and toleration of diet before discharging home.  Patient had CT enterography performed increasing concerns for inflammatory bowel disease therefore started on steroids as recommended by gastroenterology.  Patient on IV hydrocortisone in the hospital, discharging on prednisone taper with instruction to follow-up closely with GI for possible outpatient colonoscopy for further evaluation.  Patient noted to have elevated CRP, normal sed rate in the hospital.  On date of discharge fecal calprotectin still pending.  Patient seen on date of discharge, clinically and hemodynamically stable.  Patient provided concerning signs and symptoms prompting immediate medical attention, patient understanding and agreeable    DISCHARGE Follow Up Recommendations for labs and diagnostics:   Follow-up with PCP soon as possible  Follow-up with gastroenterology      Day of Discharge     Vital Signs:  Temp:  [97.2 °F (36.2 °C)-98.6 °F (37 °C)] 98.2 °F (36.8 °C)  Heart Rate:  [60-66] 60  Resp:  [16-20] 16  BP: (135-153)/(70-85) 141/70  Physical Exam:              Constitutional: Awake, alert, no acute distress              Eyes: Pupils equal, sclerae anicteric, no conjunctival injection              HENT: NCAT, mucous membranes moist              Neck: Supple, no thyromegaly, no lymphadenopathy, trachea midline              Respiratory: Clear to auscultation bilaterally, nonlabored respirations               Cardiovascular: RRR, no murmurs, rubs, or gallops, palpable pedal pulses bilaterally              Gastrointestinal: Positive bowel sounds, soft, nondistended, nontender              Musculoskeletal: No bilateral ankle edema, no clubbing or cyanosis to extremities              Psychiatric: Appropriate affect, cooperative              Neurologic: Oriented x 3, strength symmetric in all extremities, Cranial Nerves grossly intact to confrontation, speech clear               Skin: No rashes        Discharge Details        Discharge Medications        New Medications        Instructions Start Date   predniSONE 20 MG tablet  Commonly known as: DELTASONE   Take 2 tablets by mouth Daily for 6 days, THEN 1 tablet Daily for 6 days, THEN 0.5 tablets Daily for 6 days.   Start Date: May 5, 2024            Continue These Medications        Instructions Start Date   allopurinol 300 MG tablet  Commonly known as: ZYLOPRIM   300 mg, Oral, Daily      amLODIPine-benazepril 5-20 MG per capsule  Commonly known as: LOTREL 5-20   1 capsule, Oral, Daily      atorvastatin 20 MG tablet  Commonly known as: LIPITOR   20 mg, Oral, Nightly      metoprolol succinate  MG 24 hr tablet  Commonly known as: TOPROL-XL   100 mg, Oral, Daily      montelukast 10 MG tablet  Commonly known as: Singulair   10 mg, Oral, Nightly      Omega-3 500 MG capsule   Take 500 mg by mouth Daily.      sildenafil 100 MG tablet  Commonly known as: Viagra   100 mg, Oral, Daily PRN               No Known Allergies    Discharge Disposition:  Home or Self Care    Diet:  Hospital:  Diet Order   Procedures    Diet: Regular/House; Fluid Consistency: Thin (IDDSI 0)       Discharge Activity:   Activity Instructions       Activity as Tolerated              CODE STATUS:  Code Status and Medical Interventions:   Ordered at: 05/02/24 1443     Code Status (Patient has no pulse and is not breathing):    CPR (Attempt to Resuscitate)     Medical Interventions (Patient has pulse or is breathing):    Full Support         Future Appointments   Date Time Provider Department Center   7/29/2024  7:15 AM Kenia Mclean APRN [facility]   8/5/2024  9:45 AM Silv[facility]onso MD [facility]       Additional Instructions for the Follow-ups that You Need to Schedule       Discharge Follow-up with PCP   As directed       Currently Documented PCP:    Kenia Mclean APRN    PCP Phone Number:    [phone]     Follow Up Details: In less  than one week        Discharge Follow-up with Specified Provider: GunnerologyDr. Barth; 2 Weeks   As directed      To: Dr. Tab Cruz   Follow Up: 2 Weeks                Pertinent  and/or Most Recent Results     PROCEDURES:   none    LAB RESULTS:      Lab 05/05/24 0355 05/04/24 0444 05/03/24 0403 05/02/24 1248 05/02/24  1054   WBC 7.20 5.97 6.56  --  8.42   HEMOGLOBIN 11.6* 12.3* 12.5*  --  14.7   HEMATOCRIT 34.1* 36.5* 37.3*  --  44.7   PLATELETS 224 241 219  --  297   NEUTROS ABS  --   --  3.76  --  4.79   IMMATURE GRANS (ABS)  --   --  0.03  --  0.03   LYMPHS ABS  --   --  1.48  --  1.99   MONOS ABS  --   --  1.08*  --  1.41*   EOS ABS  --   --  0.18  --  0.16   MCV 87.7 88.4 88.4  --  89.4   SED RATE  --   --   --   --  5   CRP  --   --   --  3.71*  --    LACTATE  --   --   --  1.1  --          Lab 05/05/24 0355 05/04/24 0444 05/03/24 0403 05/02/24  1054   SODIUM 138 133* 137 137   POTASSIUM 3.9 3.7 3.5 4.0   CHLORIDE 103 99 103 102   CO2 25.1 24.8 24.7 25.5   ANION GAP 9.9 9.2 9.3 9.5   BUN 17 11 21 24*   CREATININE 0.88 0.83 0.85 1.09   EGFR 93.7 95.3 94.6 73.9   GLUCOSE 122* 122* 84 87   CALCIUM 8.3* 8.3* 8.0* 9.3   MAGNESIUM 1.6 1.6 1.4*  --          Lab 05/04/24 0444 05/02/24 1248 05/02/24  1054   TOTAL PROTEIN 6.2  --  7.1   ALBUMIN 3.4*  --  3.8   GLOBULIN 2.8  --  3.3   ALT (SGPT) 10  --  12   AST (SGOT) 13  --  15   BILIRUBIN 0.6  --  1.2   ALK PHOS 109  --  128*   LIPASE  --  28  --                      Brief Urine Lab Results  (Last result in the past 365 days)        Color   Clarity   Blood   Leuk Est   Nitrite   Protein   CREAT   Urine HCG        05/02/24 1248 Dark Yellow   Clear   Negative   Trace   Negative   30 mg/dL (1+)                 Microbiology Results (last 10 days)       Procedure Component Value - Date/Time    Enteric Bacterial Panel - Stool, Per Rectum [[id number]]  (Normal) Collected: 05/02/24 1119    Lab Status: Final result Specimen: Stool from Per  Rectum Updated: 05/03/24 1105     Salmonella Not Detected     Campylobacter Not Detected     Shigella/Enteroinvasive E. coli (EIEC) Not Detected     Shiga-like toxin-producing E. coli (STEC) stx1/stx2 Not Detected    Clostridioides difficile Toxin, PCR - Stool, Per Rectum [[id number]] Collected: 05/02/24 1119    Lab Status: Final result Specimen: Stool from Per Rectum Updated: 05/02/24 1340     Toxigenic C. difficile by PCR Negative     027 Toxin Presumptive Negative    Narrative:      The result indicates the absence of toxigenic C. difficile from stool specimen.             CT Enterography Abdomen Pelvis w Contrast    Result Date: 5/3/2024  Impression: Impression: 1.  Suggestion of terminal ileitis with more proximal partial small bowel obstruction appears similar to recent prior CT. 2.  Stable sclerotic lesion involving T11 vertebral body, which could represent a bone island or sclerotic metastasis given history of prostate cancer.    Electronically Signed By-Farhat Diaz MD On:5/3/2024 1:55 PM      XR Abdomen Flat & Upright    Result Date: 5/3/2024  Impression: No free air identified. Small bowel distention is slightly improved in the interval.  Electronically Signed By-Dr. Eddie Rowley MD On:5/3/2024 5:16 AM      XR Abdomen KUB    Result Date: 5/2/2024  Impression: Impression: NG tube tip in the stomach. Side port at the GE junction. Suggest advancing 5 cm.   Electronically Signed By-ZINA REYNA MD On:5/2/2024 4:18 PM      XR Abdomen KUB    Result Date: 5/2/2024  Impression: Impression: Antegrade oriented gastric tube projects over the GE junction. Consider further advancement. Mild to moderately dilated loops of bowel in keeping with suspected partial obstruction. Cholecystectomy clips.   Electronically Signed By-Linus Rose MD On:5/2/2024 3:17 PM      CT Abdomen Pelvis Without Contrast    Result Date: 5/2/2024  Impression: Impression: CT scan of the abdomen and pelvis without contrast  demonstrating fatty mural infiltration of the distal 15 cm of the terminal ileum suggesting chronic inflammatory bowel disease. Abnormal dilatation of proximal small bowel loops is consistent with partial obstruction.  I discussed findings with Ms. Stevie Heurta at 1040.      Electronically Signed By-ZACH FRASER MD On:5/2/2024 11:04 AM                   Labs Pending at Discharge:  Pending Labs       Order Current Status    Calprotectin, Fecal - Stool, Per Rectum In process              Time spent on Discharge including face to face service:  38 minutes    Electronically signed by Manny Quintero MD, 05/05/24, 8:51 AM EDT.

## 2024-05-05 NOTE — OUTREACH NOTE
Prep Survey      Flowsheet Row Responses   [facility] patient discharged from? [facility]   Is LACE score < 7 ? No   Eligibility [facility]   Date of Admission 05/02/24   Date of Discharge 05/05/24   Discharge Disposition Home or Self Care   Discharge diagnosis Small bowel obstruction   Does the patient have one of the following disease processes/diagnoses(primary or secondary)? Other   Does the patient have Home health ordered? No   Is there a DME ordered? No   Prep survey completed? Yes            DENISSE DEVI - Registered Nurse

## 2024-05-06 ENCOUNTER — TRANSITIONAL CARE MANAGEMENT TELEPHONE ENCOUNTER (OUTPATIENT)
Dept: CALL CENTER | Facility: HOSPITAL | Age: 69
End: 2024-05-06
Payer: MEDICARE

## 2024-05-14 ENCOUNTER — OFFICE VISIT (OUTPATIENT)
Dept: FAMILY MEDICINE CLINIC | Facility: CLINIC | Age: 69
End: 2024-05-14
Payer: MEDICARE

## 2024-05-14 VITALS
OXYGEN SATURATION: 96 % | RESPIRATION RATE: 16 BRPM | HEIGHT: 71 IN | WEIGHT: 207.7 LBS | SYSTOLIC BLOOD PRESSURE: 158 MMHG | HEART RATE: 48 BPM | BODY MASS INDEX: 29.08 KG/M2 | DIASTOLIC BLOOD PRESSURE: 70 MMHG | TEMPERATURE: 96.6 F

## 2024-05-14 DIAGNOSIS — R79.82 ELEVATED C-REACTIVE PROTEIN (CRP): ICD-10-CM

## 2024-05-14 DIAGNOSIS — R93.89 ABNORMAL CT SCAN: Primary | ICD-10-CM

## 2024-05-14 DIAGNOSIS — K56.609 SMALL BOWEL OBSTRUCTION: ICD-10-CM

## 2024-05-14 DIAGNOSIS — I10 BENIGN ESSENTIAL HYPERTENSION: ICD-10-CM

## 2024-05-14 DIAGNOSIS — R00.1 BRADYCARDIA: ICD-10-CM

## 2024-05-14 LAB
ALBUMIN SERPL-MCNC: 4 G/DL (ref 3.5–5.2)
ALBUMIN/GLOB SERPL: 1.7 G/DL
ALP SERPL-CCNC: 100 U/L (ref 39–117)
ALT SERPL W P-5'-P-CCNC: 32 U/L (ref 1–41)
ANION GAP SERPL CALCULATED.3IONS-SCNC: 10 MMOL/L (ref 5–15)
AST SERPL-CCNC: 15 U/L (ref 1–40)
BASOPHILS # BLD AUTO: 0.05 10*3/MM3 (ref 0–0.2)
BASOPHILS NFR BLD AUTO: 0.5 % (ref 0–1.5)
BILIRUB SERPL-MCNC: 0.6 MG/DL (ref 0–1.2)
BUN SERPL-MCNC: 17 MG/DL (ref 8–23)
BUN/CREAT SERPL: 17.7 (ref 7–25)
CALCIUM SPEC-SCNC: 8.4 MG/DL (ref 8.6–10.5)
CALPROTECTIN STL-MCNT: 4230 UG/G (ref 0–120)
CHLORIDE SERPL-SCNC: 102 MMOL/L (ref 98–107)
CO2 SERPL-SCNC: 27 MMOL/L (ref 22–29)
CREAT SERPL-MCNC: 0.96 MG/DL (ref 0.76–1.27)
CRP SERPL-MCNC: <0.3 MG/DL (ref 0–0.5)
DEPRECATED RDW RBC AUTO: 43.3 FL (ref 37–54)
EGFRCR SERPLBLD CKD-EPI 2021: 86.1 ML/MIN/1.73
EOSINOPHIL # BLD AUTO: 0.15 10*3/MM3 (ref 0–0.4)
EOSINOPHIL NFR BLD AUTO: 1.6 % (ref 0.3–6.2)
ERYTHROCYTE [DISTWIDTH] IN BLOOD BY AUTOMATED COUNT: 13.2 % (ref 12.3–15.4)
ERYTHROCYTE [SEDIMENTATION RATE] IN BLOOD: 8 MM/HR (ref 0–20)
GLOBULIN UR ELPH-MCNC: 2.4 GM/DL
GLUCOSE SERPL-MCNC: 79 MG/DL (ref 65–99)
HCT VFR BLD AUTO: 42.8 % (ref 37.5–51)
HGB BLD-MCNC: 14 G/DL (ref 13–17.7)
IMM GRANULOCYTES # BLD AUTO: 0.15 10*3/MM3 (ref 0–0.05)
IMM GRANULOCYTES NFR BLD AUTO: 1.6 % (ref 0–0.5)
LYMPHOCYTES # BLD AUTO: 3.4 10*3/MM3 (ref 0.7–3.1)
LYMPHOCYTES NFR BLD AUTO: 36.2 % (ref 19.6–45.3)
MCH RBC QN AUTO: 29.4 PG (ref 26.6–33)
MCHC RBC AUTO-ENTMCNC: 32.7 G/DL (ref 31.5–35.7)
MCV RBC AUTO: 89.7 FL (ref 79–97)
MONOCYTES # BLD AUTO: 1.23 10*3/MM3 (ref 0.1–0.9)
MONOCYTES NFR BLD AUTO: 13.1 % (ref 5–12)
NEUTROPHILS NFR BLD AUTO: 4.41 10*3/MM3 (ref 1.7–7)
NEUTROPHILS NFR BLD AUTO: 47 % (ref 42.7–76)
NRBC BLD AUTO-RTO: 0 /100 WBC (ref 0–0.2)
PLATELET # BLD AUTO: 340 10*3/MM3 (ref 140–450)
PMV BLD AUTO: 9.6 FL (ref 6–12)
POTASSIUM SERPL-SCNC: 3.9 MMOL/L (ref 3.5–5.2)
PROT SERPL-MCNC: 6.4 G/DL (ref 6–8.5)
RBC # BLD AUTO: 4.77 10*6/MM3 (ref 4.14–5.8)
SODIUM SERPL-SCNC: 139 MMOL/L (ref 136–145)
WBC NRBC COR # BLD AUTO: 9.39 10*3/MM3 (ref 3.4–10.8)

## 2024-05-14 PROCEDURE — 3077F SYST BP >= 140 MM HG: CPT | Performed by: NURSE PRACTITIONER

## 2024-05-14 PROCEDURE — 1159F MED LIST DOCD IN RCRD: CPT | Performed by: NURSE PRACTITIONER

## 2024-05-14 PROCEDURE — 1125F AMNT PAIN NOTED PAIN PRSNT: CPT | Performed by: NURSE PRACTITIONER

## 2024-05-14 PROCEDURE — 1160F RVW MEDS BY RX/DR IN RCRD: CPT | Performed by: NURSE PRACTITIONER

## 2024-05-14 PROCEDURE — 1111F DSCHRG MED/CURRENT MED MERGE: CPT | Performed by: NURSE PRACTITIONER

## 2024-05-14 PROCEDURE — 80053 COMPREHEN METABOLIC PANEL: CPT | Performed by: NURSE PRACTITIONER

## 2024-05-14 PROCEDURE — 3078F DIAST BP <80 MM HG: CPT | Performed by: NURSE PRACTITIONER

## 2024-05-14 PROCEDURE — 36415 COLL VENOUS BLD VENIPUNCTURE: CPT | Performed by: NURSE PRACTITIONER

## 2024-05-14 PROCEDURE — 99495 TRANSJ CARE MGMT MOD F2F 14D: CPT | Performed by: NURSE PRACTITIONER

## 2024-05-14 PROCEDURE — 86140 C-REACTIVE PROTEIN: CPT | Performed by: NURSE PRACTITIONER

## 2024-05-14 PROCEDURE — 85652 RBC SED RATE AUTOMATED: CPT | Performed by: NURSE PRACTITIONER

## 2024-05-14 PROCEDURE — 85025 COMPLETE CBC W/AUTO DIFF WBC: CPT | Performed by: NURSE PRACTITIONER

## 2024-05-14 RX ORDER — METOPROLOL SUCCINATE 50 MG/1
50 TABLET, EXTENDED RELEASE ORAL DAILY
Qty: 30 TABLET | Refills: 0 | Status: SHIPPED | OUTPATIENT
Start: 2024-05-14

## 2024-05-14 RX ORDER — AMLODIPINE BESYLATE 5 MG/1
5 TABLET ORAL DAILY
Qty: 30 TABLET | Refills: 0 | Status: SHIPPED | OUTPATIENT
Start: 2024-05-14

## 2024-05-14 NOTE — PROGRESS NOTES
Transitional Care Follow Up Visit  Subjective     Dion Espana is a 69 y.o. male who presents for a transitional care management visit.    Within 48 business hours after discharge our office contacted him via telephone to coordinate his care and needs.      I reviewed and discussed the details of that call along with the discharge summary, hospital problems, inpatient lab results, inpatient diagnostic studies, and consultation reports with Dion.     Current outpatient and discharge medications have been reconciled for the patient.  Reviewed by: NICOLAS Davies          5/5/2024    12:18 PM   Date of TCM Phone Call   [facility]   Date of Admission 5/2/2024   Date of Discharge 5/5/2024   Discharge Disposition Home or Self Care     Risk for Readmission (LACE) Score: 8 (5/5/2024  6:00 AM)      History of Present Illness   Course During Hospital Stay:     Hospital Course:  Dion Espana is a 69 y.o. male with a past medical history of gout, hypertension, history of prostate cancer status post radical prostatectomy, history of cholecystectomy.     Patient presented the emergency department on 5/2/2024 after being evaluated by outpatient care provider.  Patient had been started with abdominal pain bloating indigestion and 1 episode of vomiting.  CT scan was performed as an outpatient showing fatty mural infiltration of the distal 15 cm of the terminal ileum suggesting chronic inflammatory bowel disease, abnormal dilation of the proximal small bowel loops consistent with partial obstruction.  Patient has been having intermittent abdominal pain, limited, for the past month.  However starting on 4/28/2024 patient started with worsening abdominal pain resulting in minimal p.o. intake.  Patient with diarrhea initially however this is resolved.  Patient with another bout of diarrhea the night before presenting to the hospital on 5/1/2024.  Patient had 1 episode of emesis, bilious  nonbloody on 5/1/2024.  Prior to this recent episode patient denies any changes in his bowel habits, no weight loss.  Patient was sent to the emergency department and admitted for further care and management.  Patient initially started with NG tube and bowel rest.  Following resolution of symptoms NG tube removed and slowly advanced on diet, positive for bowel movements and toleration of diet before discharging home.  Patient had CT enterography performed increasing concerns for inflammatory bowel disease therefore started on steroids as recommended by gastroenterology.  Patient on IV hydrocortisone in the hospital, discharging on prednisone taper with instruction to follow-up closely with GI for possible outpatient colonoscopy for further evaluation.  Patient noted to have elevated CRP, normal sed rate in the hospital.  On date of discharge fecal calprotectin still pending.  Patient seen on date of discharge, clinically and hemodynamically stable.  Patient provided concerning signs and symptoms prompting immediate medical attention, patient understanding and agreeable     DISCHARGE Follow Up Recommendations for labs and diagnostics:   Follow-up with PCP soon as possible  Follow-up with gastroenterology      He does not have an appointment until July for GI.  I will reach out to their office as he is supposed to follow-up with them 2 to 3 days.  He is feeling so much better.  He said that he actually does not have any aches and pains because he is currently on steroids.  He said that his taste buds have changed since being on steroids but he is able to eat and drink.  He can feel his stomach is soft and not bloated.  He did say that he ate a milkshake the other day and he could feel his abdomen getting distended again so he has not been doing milk products since then.  He does need to get some blood work today.  He is here with his wife today.  I did discuss that I do not like his blood pressure slightly elevated  but his heart rate is slightly down.  He does not feel bad he said.  The following portions of the patient's history were reviewed and updated as appropriate: allergies, current medications, past family history, past medical history, past social history, past surgical history, and problem list.    Review of Systems   Constitutional:  Positive for fatigue.   Respiratory:  Negative for cough and shortness of breath.    Cardiovascular:  Negative for chest pain.   Gastrointestinal:  Negative for constipation, nausea and vomiting.       Objective   Physical Exam  Vitals reviewed.   Constitutional:       Appearance: Normal appearance. He is well-developed.   Cardiovascular:      Rate and Rhythm: Regular rhythm. Bradycardia present.      Heart sounds: Normal heart sounds. No murmur heard.  Pulmonary:      Effort: Pulmonary effort is normal.      Breath sounds: Normal breath sounds.   Neurological:      Mental Status: He is alert and oriented to person, place, and time.      Cranial Nerves: No cranial nerve deficit.      Motor: No weakness.   Psychiatric:         Mood and Affect: Mood and affect normal.         Assessment & Plan   Problems Addressed this Visit          Cardiac and Vasculature    Benign essential hypertension    Relevant Medications    amLODIPine (Norvasc) 5 MG tablet    metoprolol succinate XL (Toprol XL) 50 MG 24 hr tablet     Other Visit Diagnoses       Abnormal CT scan    -  Primary    Relevant Orders    CBC Auto Differential    C-reactive Protein    Comprehensive Metabolic Panel    Sedimentation Rate    Elevated C-reactive protein (CRP)        Relevant Orders    CBC Auto Differential    C-reactive Protein    Comprehensive Metabolic Panel    Sedimentation Rate    Small bowel obstruction        Relevant Orders    CBC Auto Differential    C-reactive Protein    Comprehensive Metabolic Panel    Sedimentation Rate    Bradycardia              Diagnoses         Codes Comments    Abnormal CT scan    -  Primary  ICD-10-CM: R93.89  ICD-9-CM: 793.99     Elevated C-reactive protein (CRP)     ICD-10-CM: R79.82  ICD-9-CM: 790.95     Small bowel obstruction     ICD-10-CM: K56.609  ICD-9-CM: 560.9     Benign essential hypertension     ICD-10-CM: I10  ICD-9-CM: 401.1     Bradycardia     ICD-10-CM: R00.1  ICD-9-CM: 427.89           I did discuss that I do not like his heart rate into the 40s.  If he stays in the high 50s into the 60s I am okay with that but currently we will cut his metoprolol down to 50 mg and also increase the amlodipine just by itself for a total of 10 on the amlodipine.  He will continue to check blood pressure and heart rate on a daily basis and send it to me via [employer].  I did show him and his wife on how to email back-and-forth through [employer].  I did get a hold of GI and Lucy does have been moved up to an earlier appointment.  Patient was aware of that change earlier this morning.  Keep follow-up as scheduled with me.  Kenia Mclean, APRN  05/14/2024

## 2024-05-21 ENCOUNTER — OFFICE VISIT (OUTPATIENT)
Dept: GASTROENTEROLOGY | Facility: CLINIC | Age: 69
End: 2024-05-21
Payer: MEDICARE

## 2024-05-21 VITALS
DIASTOLIC BLOOD PRESSURE: 60 MMHG | WEIGHT: 209.2 LBS | BODY MASS INDEX: 29.29 KG/M2 | SYSTOLIC BLOOD PRESSURE: 107 MMHG | HEIGHT: 71 IN | HEART RATE: 60 BPM

## 2024-05-21 DIAGNOSIS — R93.89 ABNORMAL CT SCAN: ICD-10-CM

## 2024-05-21 DIAGNOSIS — R93.3 ABNORMAL CT SCAN, GASTROINTESTINAL TRACT: Primary | ICD-10-CM

## 2024-05-21 DIAGNOSIS — Z11.59 ENCOUNTER FOR SCREENING FOR OTHER VIRAL DISEASES: ICD-10-CM

## 2024-05-21 DIAGNOSIS — Z86.010 HISTORY OF COLON POLYPS: ICD-10-CM

## 2024-05-21 DIAGNOSIS — K56.609 SMALL BOWEL OBSTRUCTION: ICD-10-CM

## 2024-05-21 DIAGNOSIS — A04.8 HELICOBACTER PYLORI STOOL TEST POSITIVE: ICD-10-CM

## 2024-05-21 DIAGNOSIS — R19.7 DIARRHEA, UNSPECIFIED TYPE: ICD-10-CM

## 2024-05-21 DIAGNOSIS — C61 PROSTATE CANCER: Primary | ICD-10-CM

## 2024-05-21 RX ORDER — PANTOPRAZOLE SODIUM 40 MG/1
40 TABLET, DELAYED RELEASE ORAL DAILY
Qty: 14 TABLET | Refills: 0 | Status: SHIPPED | OUTPATIENT
Start: 2024-05-21 | End: 2024-06-04

## 2024-05-21 RX ORDER — METRONIDAZOLE 500 MG/1
500 TABLET ORAL 3 TIMES DAILY
Qty: 42 TABLET | Refills: 0 | Status: SHIPPED | OUTPATIENT
Start: 2024-05-21 | End: 2024-06-04

## 2024-05-21 RX ORDER — BISMUTH SUBSALICYLATE 262 MG
524 TABLET,CHEWABLE ORAL 4 TIMES DAILY
Qty: 112 TABLET | Refills: 0 | Status: SHIPPED | OUTPATIENT
Start: 2024-05-21 | End: 2024-06-04

## 2024-05-21 RX ORDER — TETRACYCLINE HYDROCHLORIDE 500 MG/1
500 CAPSULE ORAL 4 TIMES DAILY
Qty: 56 CAPSULE | Refills: 0 | Status: SHIPPED | OUTPATIENT
Start: 2024-05-21 | End: 2024-06-04

## 2024-05-21 NOTE — PROGRESS NOTES
Spoke with patient in regards to his CT that Dr. Garcia ordered on May 3 that did show that there was a T11 sclerotic area versus bony island versus metastatic.  And I did speak to radiologist (at [facility]) and they advised me to do a bone scan for comparison from the 2020 bone scan.  Patient aware at 2:23 PM that I am going to order the bone scan and we will go from there.  Patient had no further questions at this time.   Kenia Mclean, APRN  05/21/2024

## 2024-05-21 NOTE — PROGRESS NOTES
Patient Name: Dion Espana   Visit Date: 05/21/2024   Patient ID: [id number]  Provider: NICOLAS Muhammad    Sex: male  Location:  Location Address:  Location Phone: [phone]  [place] [address] [phone]    YOB: 1955  Age: 69 y.o.   Primary Care Provider Kenia Mclean APRN      Referring Provider: No ref. provider found        Chief Complaint  Hospital Admission F/U (Pt had a small bowel obstruction 5.2.24)    History of Present Illness    Patient has not been seen since his initial visit in April 2022 when he was referred for fatty liver.  At the time he had no GI complaints.  Liver workup completed 4/5/2022 and normal     History of colonoscopy in 2021 by Dr. Barth-adenomatous polyp removed from the cecum, grade 1 internal hemorrhoids, erythema noted around IC valve, biopsy showed some active inflammation, Patient asymptomatic at the time    Patient presented to ER 5/2/2024 with abdominal pain bloating and 1 episode of vomiting, diagnosed with small bowel obstruction on CT, patient was given IV hydrocortisone and discharged on prednisone taper, elevated CRP and normal sed rate noted in the hospital, Fecal calprotectin markedly elevated so 4230  Hpylori stool Ag + (done by PCP before admission) pt has not been treated yet     CT enterography 5/2/2024: Stomach moderately distended, submucosal fat with wall thickening of the terminal ileum appears similar to previous exam with several mildly dilated more proximal small bowel loops-suggest terminal ileitis with more proximal partial small bowel obstruction, also noted is a sclerotic lesion at T11 - states checked with urology    Pt states he does have hx of diarrhea, but usually no pain.  Currently feeling well, no abd pain, states stool sometimes formed and sometimes not. No blood seen in stool   States salad has caused abd pain in past     Past Medical History:   Diagnosis Date    Arthritis     Bone lesion  "05/21/2020    Diverticulitis     Essential hypertension 10/15/2020    Gout 10/15/2020    Headache     Hearing loss     Hemorrhoids     Hyperlipidemia 10/15/2020    Lumbago 07/13/2017    LOW BACK PAIN    Lumbar disc herniation 07/13/2017    LEFT; L3-4    Lumbar spinal stenosis 05/21/2020    Paresthesia 05/21/2020    Prostate cancer 05/21/2020    Radiculopathy, lumbosacral region 05/21/2020    Renal insufficiency 10/15/2020    Sciatica 07/13/2017    Small bowel obstruction 2024    Tinnitus        Past Surgical History:   Procedure Laterality Date    BACK SURGERY      X2    CARPAL TUNNEL RELEASE      CHOLECYSTECTOMY      COLONOSCOPY  2006    DR HADLEY    HAND SURGERY      THUMB SURGERY    KNEE ARTHROSCOPY Bilateral     LUMBAR DISCECTOMY  07/19/2017    L3-4; DR UGARTE; MINIMALLY INVASIVE    OTHER SURGICAL HISTORY      FISTULA    PROSTATE BIOPSY      PROSTATECTOMY      VASECTOMY         No Known Allergies    Family History   Problem Relation Age of Onset    Arthritis Mother     Stroke Mother     Heart disease Mother     Diabetes Mother     Heart disease Father     Stroke Brother     Colon cancer Neg Hx         Social History     Tobacco Use    Smoking status: Never    Smokeless tobacco: Never   Vaping Use    Vaping status: Never Used   Substance Use Topics    Alcohol use: Not Currently    Drug use: Never       Objective     Vital Signs:   /60 (BP Location: Right arm, Patient Position: Sitting, Cuff Size: Adult)   Pulse 60   Ht 180.3 cm (71\")   Wt 94.9 kg (209 lb 3.2 oz)   BMI 29.18 kg/m²       Physical Exam  Constitutional:       General: The patient is not in acute distress.     Appearance: Normal appearance.   HENT:      Head: Normocephalic and atraumatic.      Nose: Nose normal.   Pulmonary:      Effort: Pulmonary effort is normal. No respiratory distress.   Abdominal:      General: Abdomen is flat.      Palpations: Abdomen is soft. There is no mass.      Tenderness: There is no abdominal tenderness. " There is no guarding.   Musculoskeletal:      Cervical back: Neck supple.      Right lower leg: No edema.      Left lower leg: No edema.   Skin:     General: Skin is warm and dry.   Neurological:      General: No focal deficit present.      Mental Status: The patient is alert and oriented to person, place, and time.      Gait: Gait normal.   Psychiatric:         Mood and Affect: Mood normal.         Speech: Speech normal.         Behavior: Behavior normal.         Thought Content: Thought content normal.     Result Review :   The following data was reviewed by: NICOLAS Muhammad on 05/21/2024:    CBC w/diff          5/4/2024    04:44 5/5/2024    03:55 5/14/2024    09:04   CBC w/Diff   WBC 5.97  7.20  9.39    RBC 4.13  3.89  4.77    Hemoglobin 12.3  11.6  14.0    Hematocrit 36.5  34.1  42.8    MCV 88.4  87.7  89.7    MCH 29.8  29.8  29.4    MCHC 33.7  34.0  32.7    RDW 12.9  12.9  13.2    Platelets 241  224  340    Neutrophil Rel %   47.0    Immature Granulocyte Rel %   1.6    Lymphocyte Rel %   36.2    Monocyte Rel %   13.1    Eosinophil Rel %   1.6    Basophil Rel %   0.5      CMP          5/4/2024    04:44 5/5/2024    03:55 5/14/2024    09:04   CMP   Glucose 122  122  79    BUN 11  17  17    Creatinine 0.83  0.88  0.96    EGFR 95.3  93.7  86.1    Sodium 133  138  139    Potassium 3.7  3.9  3.9    Chloride 99  103  102    Calcium 8.3  8.3  8.4    Total Protein 6.2   6.4    Albumin 3.4   4.0    Globulin 2.8   2.4    Total Bilirubin 0.6   0.6    Alkaline Phosphatase 109   100    AST (SGOT) 13   15    ALT (SGPT) 10   32    Albumin/Globulin Ratio 1.2   1.7    BUN/Creatinine Ratio 13.3  19.3  17.7    Anion Gap 9.2  9.9  10.0        Liver Workup   A-1 Antitrypsin   Date Value Ref Range Status   04/06/2022 142 101 - 187 mg/dL Final     dsDNA   Date Value Ref Range Status   04/05/2022 Negative Negative Final     Expanded KHLOE Screen   Date Value Ref Range Status   04/05/2022 Negative Negative Final     Smooth  Muscle Ab   Date Value Ref Range Status   04/05/2022 11 0 - 19 Units Final     Comment:                      Negative                     0 - 19                   Weak positive               20 - 30                   Moderate to strong positive     >30   Actin Antibodies are found in 52-85% of patients with   autoimmune hepatitis or chronic active hepatitis and   in 22% of patients with primary biliary cirrhosis.     Ceruloplasmin   Date Value Ref Range Status   04/05/2022 21 16 - 31 mg/dL Final     Ferritin   Date Value Ref Range Status   04/05/2022 150.00 30.00 - 400.00 ng/mL Final     IgG   Date Value Ref Range Status   04/05/2022 1069 603 - 1613 mg/dL Final     IgA   Date Value Ref Range Status   04/05/2022 265 61 - 437 mg/dL Final     IgM   Date Value Ref Range Status   04/05/2022 155 20 - 172 mg/dL Final     Iron   Date Value Ref Range Status   04/05/2022 108 59 - 158 mcg/dL Final     TIBC   Date Value Ref Range Status   04/05/2022 378 298 - 536 mcg/dL Final     Iron Saturation (TSAT)   Date Value Ref Range Status   04/05/2022 29 20 - 50 % Final     Transferrin   Date Value Ref Range Status   04/05/2022 254 200 - 360 mg/dL Final     Mitochondrial Ab   Date Value Ref Range Status   04/05/2022 <20.0 0.0 - 20.0 Units Final     Comment:                                     Negative    0.0 - 20.0                                  Equivocal  20.1 - 24.9                                  Positive         >24.9  Mitochondrial (M2) Antibodies are found in 90-96% of  patients with primary biliary cirrhosis.     Protime   Date Value Ref Range Status   04/05/2022 10.1 9.4 - 12.0 Seconds Final     INR   Date Value Ref Range Status   04/05/2022 0.95 (L) 2.00 - 3.00 Final     ALPHA-FETOPROTEIN   Date Value Ref Range Status   04/05/2022 3.38 0 - 8.3 ng/mL Final     Acute HEP Panel   Hepatitis B Surface Ag   Date Value Ref Range Status   04/05/2022 Non-Reactive Non-Reactive Final     Hepatitis C Ab   Date Value Ref Range Status    01/25/2022 Non-Reactive Non-Reactive Final               Assessment and Plan    Diagnoses and all orders for this visit:    1. Abnormal CT scan, gastrointestinal tract (Primary)  -     Case Request; Standing  -     Case Request  -     Hepatitis B Core Antibody, Total; Future  -     QuantiFERON TB Gold; Future  -     Histoplasma Ag Ur - , Urine, Clean Catch; Future  -     Hepatitis B Surface Antigen; Future  -     Hepatitis B Surface Antibody; Future    2. Small bowel obstruction  -     Case Request; Standing  -     Case Request  -     Hepatitis B Core Antibody, Total; Future  -     QuantiFERON TB Gold; Future  -     Histoplasma Ag Ur - , Urine, Clean Catch; Future  -     Hepatitis B Surface Antigen; Future  -     Hepatitis B Surface Antibody; Future    3. Diarrhea, unspecified type  -     Case Request; Standing  -     Case Request    4. History of colon polyps    5. Helicobacter pylori stool test positive  -     Case Request; Standing  -     Case Request    6. Encounter for screening for other viral diseases  -     Hepatitis B Core Antibody, Total; Future  -     Hepatitis B Surface Antigen; Future  -     Hepatitis B Surface Antibody; Future    Other orders  -     polyethylene glycol (GoLYTELY) 236 g solution; Take per office instructions  Dispense: 4000 mL; Refill: 0  -     Implement Anesthesia orders day of procedure.; Standing  -     Follow Anesthesia Guidelines / Protocol; Future  -     Obtain Informed Consent; Future  -     Verify NPO; Standing  -     Verify bowel prep was successful; Standing  -     Give tap water enema if bowel prep was insufficient; Standing  -     Obtain Informed Consent; Standing  -     metroNIDAZOLE (FLAGYL) 500 MG tablet; Take 1 tablet by mouth 3 (Three) Times a Day for 14 days.  Dispense: 42 tablet; Refill: 0  -     tetracycline (ACHROMYCIN,SUMYCIN) 500 MG capsule; Take 1 capsule by mouth 4 (Four) Times a Day for 14 days.  Dispense: 56 capsule; Refill: 0  -     Bismuth 262 MG chewable  tablet; Chew 2 tablets 4 (Four) Times a Day for 14 days.  Dispense: 112 tablet; Refill: 0  -     pantoprazole (PROTONIX) 40 MG EC tablet; Take 1 tablet by mouth Daily for 14 days.  Dispense: 14 tablet; Refill: 0              Follow Up   Return for follow up after procedure.  Treatment for hpylori - Tetracycline/Flagyl/Bismuth/PPI  Low residue diet   EGD/COLON Surgical Risk and Benefits: Possible risks/complications, benefits, and alternatives to surgical or invasive procedure have been explained to patient and/or legal guardian; risks include bleeding, infection, and perforation. Patient has been evaluated and can tolerate anesthesia and/or sedation. Risks, benefits, and alternatives to anesthesia and sedation have been explained to patient and/or legal guardian.     Patient was given instructions and counseling regarding his condition or for health maintenance advice. Please see specific information pulled into the AVS if appropriate.

## 2024-06-06 ENCOUNTER — LAB (OUTPATIENT)
Dept: LAB | Facility: HOSPITAL | Age: 69
End: 2024-06-06
Payer: MEDICARE

## 2024-06-06 DIAGNOSIS — R93.3 ABNORMAL CT SCAN, GASTROINTESTINAL TRACT: ICD-10-CM

## 2024-06-06 DIAGNOSIS — K56.609 SMALL BOWEL OBSTRUCTION: ICD-10-CM

## 2024-06-06 DIAGNOSIS — Z11.59 ENCOUNTER FOR SCREENING FOR OTHER VIRAL DISEASES: ICD-10-CM

## 2024-06-06 LAB
HBV SURFACE AB SER RIA-ACNC: NORMAL
HBV SURFACE AG SERPL QL IA: NORMAL

## 2024-06-06 PROCEDURE — 86704 HEP B CORE ANTIBODY TOTAL: CPT

## 2024-06-06 PROCEDURE — 86480 TB TEST CELL IMMUN MEASURE: CPT

## 2024-06-06 PROCEDURE — 87385 HISTOPLASMA CAPSUL AG IA: CPT

## 2024-06-06 PROCEDURE — 86706 HEP B SURFACE ANTIBODY: CPT

## 2024-06-06 PROCEDURE — 36415 COLL VENOUS BLD VENIPUNCTURE: CPT

## 2024-06-06 PROCEDURE — 87340 HEPATITIS B SURFACE AG IA: CPT

## 2024-06-07 ENCOUNTER — ANESTHESIA EVENT (OUTPATIENT)
Dept: GASTROENTEROLOGY | Facility: HOSPITAL | Age: 69
End: 2024-06-07
Payer: MEDICARE

## 2024-06-07 LAB — HBV CORE AB SERPL QL IA: NEGATIVE

## 2024-06-07 NOTE — ANESTHESIA PREPROCEDURE EVALUATION
Anesthesia Evaluation     Patient summary reviewed   NPO Solid Status: > 8 hours  NPO Liquid Status: > 4 hours           Airway   Mallampati: II  TM distance: >3 FB  Neck ROM: full  No difficulty expected  Dental - normal exam     Pulmonary - normal exam    breath sounds clear to auscultation  Cardiovascular - normal exam  Exercise tolerance: good (4-7 METS)    ECG reviewed  Patient on routine beta blocker  Rhythm: regular  Rate: normal    (+) hypertension well controlled 2 medications or greater, hyperlipidemia    ROS comment: Last dose metoprolol 6/09 at 2200    Neuro/Psych  (+) headaches, numbness  GI/Hepatic/Renal/Endo    (+) renal disease- CRI    Musculoskeletal     (+) back pain, radiculopathy  Abdominal    Substance History      OB/GYN          Other   arthritis,   history of cancer    ROS/Med Hx Other: 07/01/20: HR 63, SR, borderline intraventricular conductive delay    Abnormal CT, previous small bowel obstruction  CT abdomen /pelvis 05/02/24:   Impression:  1.  Suggestion of terminal ileitis with more proximal partial small bowel obstruction appears similar to recent prior CT.  2.  Stable sclerotic lesion involving T11 vertebral body, which could represent a bone island or sclerotic metastasis given history of prostate cancer.                  Anesthesia Plan    ASA 2     general   total IV anesthesia  (Total IV Anesthesia    Patient understands anesthesia not responsible for dental damage.  )  intravenous induction     Anesthetic plan, risks, benefits, and alternatives have been provided, discussed and informed consent has been obtained with: patient.  Pre-procedure education provided  Plan discussed with CRNA.      CODE STATUS:

## 2024-06-09 LAB
GAMMA INTERFERON BACKGROUND BLD IA-ACNC: 0.04 IU/ML
H CAPSUL AG UR QL IA: NEGATIVE
M TB IFN-G BLD-IMP: NEGATIVE
M TB IFN-G CD4+ BCKGRND COR BLD-ACNC: 0.05 IU/ML
M TB IFN-G CD4+CD8+ BCKGRND COR BLD-ACNC: 0.04 IU/ML
MITOGEN IGNF BCKGRD COR BLD-ACNC: 5.02 IU/ML
QUANTIFERON INCUBATION: NORMAL
SERVICE CMNT-IMP: NORMAL

## 2024-06-10 ENCOUNTER — ANESTHESIA (OUTPATIENT)
Dept: GASTROENTEROLOGY | Facility: HOSPITAL | Age: 69
End: 2024-06-10
Payer: MEDICARE

## 2024-06-10 ENCOUNTER — HOSPITAL ENCOUNTER (OUTPATIENT)
Facility: HOSPITAL | Age: 69
Setting detail: HOSPITAL OUTPATIENT SURGERY
Discharge: HOME | End: 2024-06-10
Attending: INTERNAL MEDICINE | Admitting: INTERNAL MEDICINE
Payer: MEDICARE

## 2024-06-10 VITALS
OXYGEN SATURATION: 96 % | RESPIRATION RATE: 15 BRPM | DIASTOLIC BLOOD PRESSURE: 74 MMHG | HEART RATE: 62 BPM | WEIGHT: 203.71 LBS | TEMPERATURE: 97 F | SYSTOLIC BLOOD PRESSURE: 122 MMHG | BODY MASS INDEX: 29.16 KG/M2 | HEIGHT: 70 IN

## 2024-06-10 DIAGNOSIS — K56.609 SMALL BOWEL OBSTRUCTION: ICD-10-CM

## 2024-06-10 DIAGNOSIS — A04.8 HELICOBACTER PYLORI STOOL TEST POSITIVE: ICD-10-CM

## 2024-06-10 DIAGNOSIS — R93.3 ABNORMAL CT SCAN, GASTROINTESTINAL TRACT: ICD-10-CM

## 2024-06-10 DIAGNOSIS — R19.7 DIARRHEA, UNSPECIFIED TYPE: ICD-10-CM

## 2024-06-10 PROCEDURE — 45380 COLONOSCOPY AND BIOPSY: CPT | Performed by: INTERNAL MEDICINE

## 2024-06-10 PROCEDURE — 88342 IMHCHEM/IMCYTCHM 1ST ANTB: CPT | Performed by: INTERNAL MEDICINE

## 2024-06-10 PROCEDURE — 43239 EGD BIOPSY SINGLE/MULTIPLE: CPT | Performed by: INTERNAL MEDICINE

## 2024-06-10 PROCEDURE — 88305 TISSUE EXAM BY PATHOLOGIST: CPT | Performed by: INTERNAL MEDICINE

## 2024-06-10 PROCEDURE — 25010000002 PROPOFOL 10 MG/ML EMULSION: Performed by: NURSE ANESTHETIST, CERTIFIED REGISTERED

## 2024-06-10 PROCEDURE — 25810000003 LACTATED RINGERS PER 1000 ML

## 2024-06-10 RX ORDER — PROPOFOL 10 MG/ML
VIAL (ML) INTRAVENOUS AS NEEDED
Status: DISCONTINUED | OUTPATIENT
Start: 2024-06-10 | End: 2024-06-10 | Stop reason: SURG

## 2024-06-10 RX ORDER — SODIUM CHLORIDE, SODIUM LACTATE, POTASSIUM CHLORIDE, CALCIUM CHLORIDE 600; 310; 30; 20 MG/100ML; MG/100ML; MG/100ML; MG/100ML
30 INJECTION, SOLUTION INTRAVENOUS CONTINUOUS
Status: DISCONTINUED | OUTPATIENT
Start: 2024-06-10 | End: 2024-06-10 | Stop reason: HOSPADM

## 2024-06-10 RX ORDER — LIDOCAINE HYDROCHLORIDE 20 MG/ML
INJECTION, SOLUTION EPIDURAL; INFILTRATION; INTRACAUDAL; PERINEURAL AS NEEDED
Status: DISCONTINUED | OUTPATIENT
Start: 2024-06-10 | End: 2024-06-10 | Stop reason: SURG

## 2024-06-10 RX ADMIN — SODIUM CHLORIDE, POTASSIUM CHLORIDE, SODIUM LACTATE AND CALCIUM CHLORIDE 30 ML/HR: 600; 310; 30; 20 INJECTION, SOLUTION INTRAVENOUS at 12:38

## 2024-06-10 RX ADMIN — LIDOCAINE HYDROCHLORIDE 30 MG: 20 INJECTION, SOLUTION EPIDURAL; INFILTRATION; INTRACAUDAL; PERINEURAL at 14:04

## 2024-06-10 RX ADMIN — PROPOFOL 150 MCG/KG/MIN: 10 INJECTION, EMULSION INTRAVENOUS at 14:04

## 2024-06-10 RX ADMIN — PROPOFOL 100 MG: 10 INJECTION, EMULSION INTRAVENOUS at 14:04

## 2024-06-10 NOTE — ANESTHESIA POSTPROCEDURE EVALUATION
Patient: Dion Espana    Procedure Summary       Date: 06/10/24 Room / Location: [facility] ENDOSCOPY 4 / [facility] ENDOSCOPY    Anesthesia Start: 1358 Anesthesia Stop: 1449    Procedures:       ESOPHAGOGASTRODUODENOSCOPY WITH BIOPSIES      COLONOSCOPY WITH BIOPSIES Diagnosis:       Abnormal CT scan, gastrointestinal tract      Small bowel obstruction      Diarrhea, unspecified type      Helicobacter pylori stool test positive      (Abnormal CT scan, gastrointestinal tract [R93.3])      (Small bowel obstruction [K56.609])      (Diarrhea, unspecified type [R19.7])      (Helicobacter pylori stool test positive [A04.8])    Surgeons: Kevin Barth MD Provider: Eddie Bradford CRNA    Anesthesia Type: general ASA Status: 2            Anesthesia Type: general    Vitals  Vitals Value Taken Time   /74 06/10/24 1506   Temp 36.1 °C (97 °F) 06/10/24 1505   Pulse 56 06/10/24 1509   Resp 15 06/10/24 1505   SpO2 97 % 06/10/24 1509   Vitals shown include unfiled device data.        Post Anesthesia Care and Evaluation    Post-procedure mental status: acceptable.  Pain management: satisfactory to patient    Airway patency: patent  Anesthetic complications: No anesthetic complications    Cardiovascular status: acceptable  Respiratory status: acceptable    Comments: Per chart review

## 2024-06-10 NOTE — H&P
Pre Procedure History & Physical    Chief Complaint:   Abnormal CT of the GI tract    Subjective     HPI:   Abnormal CT of the GI tract    Past Medical History:   Past Medical History:   Diagnosis Date    Arthritis     Bone lesion 05/21/2020    Diverticulitis     Elevated cholesterol     Essential hypertension 10/15/2020    Gout 10/15/2020    Headache     Hearing loss     Hemorrhoids     Hyperlipidemia 10/15/2020    Lumbago 07/13/2017    LOW BACK PAIN    Lumbar disc herniation 07/13/2017    LEFT; L3-4    Lumbar spinal stenosis 05/21/2020    Paresthesia 05/21/2020    Prostate cancer 05/21/2020    Prostate cancer     Radiculopathy, lumbosacral region 05/21/2020    Renal insufficiency 10/15/2020    Sciatica 07/13/2017    Small bowel obstruction 2024    Tinnitus        Past Surgical History:  Past Surgical History:   Procedure Laterality Date    BACK SURGERY      X2    CARPAL TUNNEL RELEASE      CHOLECYSTECTOMY      COLONOSCOPY  2006    DR HADLEY    HAND SURGERY      THUMB SURGERY    KNEE ARTHROSCOPY Bilateral     LUMBAR DISCECTOMY  07/19/2017    L3-4; DR UGARTE; MINIMALLY INVASIVE    OTHER SURGICAL HISTORY      FISTULA    PROSTATE BIOPSY      PROSTATECTOMY      VASECTOMY         Family History:  Family History   Problem Relation Age of Onset    Arthritis Mother     Stroke Mother     Heart disease Mother     Diabetes Mother     Heart disease Father     Stroke Brother     Colon cancer Neg Hx        Social History:   reports that he has never smoked. He has never used smokeless tobacco. He reports that he does not currently use alcohol. He reports that he does not use drugs.    Medications:   Medications Prior to Admission   Medication Sig Dispense Refill Last Dose    allopurinol (ZYLOPRIM) 300 MG tablet Take 1 tablet by mouth Daily. 90 tablet 1 6/9/2024    amLODIPine-benazepril (LOTREL 5-20) 5-20 MG per capsule Take 1 capsule by mouth Daily. 90 capsule 1 6/9/2024    atorvastatin (LIPITOR) 20 MG tablet Take 1 tablet by  "mouth Every Night. 90 tablet 1 6/9/2024    Krill Oil (Omega-3) 500 MG capsule Take 500 mg by mouth Daily.   Past Week    metoprolol succinate XL (TOPROL-XL) 100 MG 24 hr tablet Take 1 tablet by mouth Daily. 90 tablet 1 6/9/2024    montelukast (Singulair) 10 MG tablet Take 1 tablet by mouth Every Night. 90 tablet 1 6/9/2024    sildenafil (Viagra) 100 MG tablet Take 1 tablet by mouth Daily As Needed for Erectile Dysfunction. 30 tablet 3        Allergies:  Patient has no known allergies.        Objective     Blood pressure 135/81, pulse 62, temperature 97.7 °F (36.5 °C), temperature source Temporal, resp. rate 18, height 177.8 cm (70\"), weight 92.4 kg (203 lb 11.3 oz), SpO2 94%.    Physical Exam   Constitutional: Pt is oriented to person, place, and time and well-developed, well-nourished, and in no distress.   Mouth/Throat: Oropharynx is clear and moist.   Neck: Normal range of motion.   Cardiovascular: Normal rate, regular rhythm and normal heart sounds.    Pulmonary/Chest: Effort normal and breath sounds normal.   Abdominal: Soft. Nontender  Skin: Skin is warm and dry.   Psychiatric: Mood, memory, affect and judgment normal.     Assessment & Plan     Diagnosis:  Abnormal CT of the GI tract    Anticipated Surgical Procedure:  EGD colonoscopy    The risks, benefits, and alternatives of this procedure have been discussed with the patient or the responsible party- the patient understands and agrees to proceed.            "

## 2024-06-12 ENCOUNTER — HOSPITAL ENCOUNTER (OUTPATIENT)
Dept: NUCLEAR MEDICINE | Facility: HOSPITAL | Age: 69
Discharge: HOME | End: 2024-06-12
Payer: MEDICARE

## 2024-06-12 DIAGNOSIS — C61 PROSTATE CANCER: ICD-10-CM

## 2024-06-12 DIAGNOSIS — R93.89 ABNORMAL CT SCAN: ICD-10-CM

## 2024-06-12 PROCEDURE — A9503 TC99M MEDRONATE: HCPCS | Performed by: NURSE PRACTITIONER

## 2024-06-12 PROCEDURE — 0 TECHNETIUM MEDRONATE KIT: Performed by: NURSE PRACTITIONER

## 2024-06-12 PROCEDURE — 78306 BONE IMAGING WHOLE BODY: CPT

## 2024-06-12 RX ORDER — TC 99M MEDRONATE 20 MG/10ML
22.3 INJECTION, POWDER, LYOPHILIZED, FOR SOLUTION INTRAVENOUS
Status: COMPLETED | OUTPATIENT
Start: 2024-06-12 | End: 2024-06-12

## 2024-06-12 RX ADMIN — TC 99M MEDRONATE 22.3 MILLICURIE: 20 INJECTION, POWDER, LYOPHILIZED, FOR SOLUTION INTRAVENOUS at 12:10

## 2024-06-13 LAB
CYTO UR: NORMAL
LAB AP CASE REPORT: NORMAL
LAB AP CLINICAL INFORMATION: NORMAL
LAB AP SPECIAL STAINS: NORMAL
PATH REPORT.FINAL DX SPEC: NORMAL
PATH REPORT.GROSS SPEC: NORMAL

## 2024-06-13 NOTE — PROGRESS NOTES
Dion Espana    EGD 6/10/2024: Hiatal hernia, normal esophagus, normal stomach-biopsy with mild chronic inactive gastritis otherwise negative, normal duodenum  Colonoscopy 6/10/2024: Good prep, nonbleeding erosions at the ileocecal valve, benign-appearing intrinsic mild stenosis at the ileocecal valve was not traversed, biopsy taken-chronic ileocolitis with moderate to severe activity and ulcer, no granulomas, terminal ileum appeared normal, diverticula found in the sigmoid    Please see if patient okay with beginning Stelara for Crohn's disease? Is he familiar with this-I think we talked about it some at his last visit

## 2024-06-19 ENCOUNTER — TELEPHONE (OUTPATIENT)
Dept: GASTROENTEROLOGY | Facility: CLINIC | Age: 69
End: 2024-06-19

## 2024-06-19 ENCOUNTER — OFFICE VISIT (OUTPATIENT)
Dept: GASTROENTEROLOGY | Facility: CLINIC | Age: 69
End: 2024-06-19
Payer: MEDICARE

## 2024-06-19 VITALS
WEIGHT: 212.6 LBS | SYSTOLIC BLOOD PRESSURE: 128 MMHG | HEART RATE: 68 BPM | HEIGHT: 70 IN | DIASTOLIC BLOOD PRESSURE: 61 MMHG | BODY MASS INDEX: 30.43 KG/M2

## 2024-06-19 DIAGNOSIS — K50.812 CROHN'S DISEASE OF BOTH SMALL AND LARGE INTESTINE WITH INTESTINAL OBSTRUCTION: ICD-10-CM

## 2024-06-19 DIAGNOSIS — R93.3 ABNORMAL CT SCAN, GASTROINTESTINAL TRACT: Primary | ICD-10-CM

## 2024-06-19 DIAGNOSIS — Z87.19 HISTORY OF SMALL BOWEL OBSTRUCTION: ICD-10-CM

## 2024-06-19 NOTE — TELEPHONE ENCOUNTER
Was calling patients from waitlist, TR had a appt openings today. Patient has been schedule for 06.19.24 @ 1:15.

## 2024-06-19 NOTE — PROGRESS NOTES
Patient Name: Dion Espana   Visit Date: 06/19/2024   Patient ID: [id number]  Provider: NICOLAS Muhammad    Sex: male  Location:  Location Address:  Location Phone: [address]  [place] [address] [phone]    YOB: 1955  Age: 69 y.o.   Primary Care Provider Kenia Mclean APRN      Referring Provider: No ref. provider found        Chief Complaint  EGD/Colonoscopy (Follow up )    History of Present Illness    Initial visit April 22-referred for fatty liver, liver workup was - 4/5/2022.    History of colonoscopy in 2021 by Dr. Barth-adenomatous polyp removed from the cecum, grade 1 internal hemorrhoids, erythema noted around IC valve, biopsy showed some active inflammation, Patient asymptomatic at the time     Patient presented to ER 5/2/2024 with abdominal pain bloating and 1 episode of vomiting, diagnosed with small bowel obstruction on CT, patient was given IV hydrocortisone and discharged on prednisone taper, elevated CRP and normal sed rate noted in the hospital, Fecal calprotectin markedly elevated so 4230  Hpylori stool Ag + (done by PCP before admission) pt had not been treated yet      CT enterography 5/2/2024: Stomach moderately distended, submucosal fat with wall thickening of the terminal ileum appears similar to previous exam with several mildly dilated more proximal small bowel loops-suggest terminal ileitis with more proximal partial small bowel obstruction, also noted is a sclerotic lesion at T11 - states checked with urology     Patient last seen 5/21/2024 he was feeling well at the time and main symptom was diarrhea intermittently.  Patient was treated for H. pylori with tetracycline, Flagyl, bismuth and PPI    EGD 6/10/2024: Hiatal hernia, normal esophagus, normal stomach-biopsy with mild chronic inactive gastritis otherwise negative, normal duodenum. Bx negative for hpylori   Colonoscopy 6/10/2024: Good prep, nonbleeding erosions at the ileocecal  valve, benign-appearing intrinsic mild stenosis at the ileocecal valve was not traversed, biopsy taken-chronic ileocolitis with moderate to severe activity and ulcer, no granulomas, terminal ileum appeared normal, diverticula found in the sigmoid     Pt states he finished all of the antibiotics for hypylori, stomach bx was negative.   He is feeling well , not currently having diarrhea, averaging 1 -2 stools/d   We discussed treatment options for Crohn's disease  Past Medical History:   Diagnosis Date    Arthritis     Bone lesion 05/21/2020    Diverticulitis     Elevated cholesterol     Essential hypertension 10/15/2020    Gout 10/15/2020    Headache     Hearing loss     Hemorrhoids     Hyperlipidemia 10/15/2020    Lumbago 07/13/2017    LOW BACK PAIN    Lumbar disc herniation 07/13/2017    LEFT; L3-4    Lumbar spinal stenosis 05/21/2020    Paresthesia 05/21/2020    Prostate cancer 05/21/2020    Prostate cancer     Radiculopathy, lumbosacral region 05/21/2020    Renal insufficiency 10/15/2020    Sciatica 07/13/2017    Small bowel obstruction 2024    Tinnitus        Past Surgical History:   Procedure Laterality Date    BACK SURGERY      X2    CARPAL TUNNEL RELEASE      CHOLECYSTECTOMY      COLONOSCOPY  2006    DR HADLEY    COLONOSCOPY N/A 6/10/2024    Procedure: COLONOSCOPY WITH BIOPSIES;  Surgeon: Kevin Barth MD;  Location: [facility];  Service: Gastroenterology;  Laterality: N/A;  COLON ERROSIONS, DIVERTICULOSIS    ENDOSCOPY N/A 6/10/2024    Procedure: ESOPHAGOGASTRODUODENOSCOPY WITH BIOPSIES;  Surgeon: Kevin Barth MD;  Location: [facility] ENDOSCOPY;  Service: Gastroenterology;  Laterality: N/A;  HIATAL HERNIA    HAND SURGERY      THUMB SURGERY    KNEE ARTHROSCOPY Bilateral     LUMBAR DISCECTOMY  07/19/2017    L3-4; DR UGARTE; MINIMALLY INVASIVE    OTHER SURGICAL HISTORY      FISTULA    PROSTATE BIOPSY      PROSTATECTOMY      VASECTOMY         No Known Allergies    Family History   Problem  "Relation Age of Onset    Arthritis Mother     Stroke Mother     Heart disease Mother     Diabetes Mother     Heart disease Father     Stroke Brother     Colon cancer Neg Hx         Social History     Tobacco Use    Smoking status: Never    Smokeless tobacco: Never   Vaping Use    Vaping status: Never Used   Substance Use Topics    Alcohol use: Not Currently    Drug use: Never       Objective     Vital Signs:   /61 (BP Location: Right arm, Patient Position: Sitting, Cuff Size: Adult)   Pulse 68   Ht 177.8 cm (70\")   Wt 96.4 kg (212 lb 9.6 oz)   BMI 30.50 kg/m²       Physical Exam  Constitutional:       General: The patient is not in acute distress.     Appearance: Normal appearance.   HENT:      Head: Normocephalic and atraumatic.      Nose: Nose normal.   Pulmonary:      Effort: Pulmonary effort is normal. No respiratory distress.   Abdominal:      General: Abdomen is flat.      Palpations: Abdomen is soft. There is no mass.      Tenderness: There is no abdominal tenderness. There is no guarding.   Musculoskeletal:      Cervical back: Neck supple.      Right lower leg: No edema.      Left lower leg: No edema.   Skin:     General: Skin is warm and dry.   Neurological:      General: No focal deficit present.      Mental Status: The patient is alert and oriented to person, place, and time.      Gait: Gait normal.   Psychiatric:         Mood and Affect: Mood normal.         Speech: Speech normal.         Behavior: Behavior normal.         Thought Content: Thought content normal.     Result Review :   The following data was reviewed by: NICOLAS Muhammad on 06/19/2024:    CBC w/diff          5/4/2024    04:44 5/5/2024    03:55 5/14/2024    09:04   CBC w/Diff   WBC 5.97  7.20  9.39    RBC 4.13  3.89  4.77    Hemoglobin 12.3  11.6  14.0    Hematocrit 36.5  34.1  42.8    MCV 88.4  87.7  89.7    MCH 29.8  29.8  29.4    MCHC 33.7  34.0  32.7    RDW 12.9  12.9  13.2    Platelets 241  224  340  "   Neutrophil Rel %   47.0    Immature Granulocyte Rel %   1.6    Lymphocyte Rel %   36.2    Monocyte Rel %   13.1    Eosinophil Rel %   1.6    Basophil Rel %   0.5      CMP          5/4/2024    04:44 5/5/2024    03:55 5/14/2024    09:04   CMP   Glucose 122  122  79    BUN 11  17  17    Creatinine 0.83  0.88  0.96    EGFR 95.3  93.7  86.1    Sodium 133  138  139    Potassium 3.7  3.9  3.9    Chloride 99  103  102    Calcium 8.3  8.3  8.4    Total Protein 6.2   6.4    Albumin 3.4   4.0    Globulin 2.8   2.4    Total Bilirubin 0.6   0.6    Alkaline Phosphatase 109   100    AST (SGOT) 13   15    ALT (SGPT) 10   32    Albumin/Globulin Ratio 1.2   1.7    BUN/Creatinine Ratio 13.3  19.3  17.7    Anion Gap 9.2  9.9  10.0                    Assessment and Plan    Diagnoses and all orders for this visit:    1. Abnormal CT scan, gastrointestinal tract (Primary)  -     CBC (No Diff); Future  -     Comprehensive Metabolic Panel; Future    2. History of small bowel obstruction  -     CBC (No Diff); Future  -     Comprehensive Metabolic Panel; Future    3. Crohn's disease of both small and large intestine with intestinal obstruction  Comments:  Hx of SBO 5/2/24  stenosis at ileocecal valve  Chronic ileocolitis w mod - severe activity and ulcer  Orders:  -     CBC (No Diff); Future  -     Comprehensive Metabolic Panel; Future              Follow Up   Return in about 3 months (around 9/19/2024).  Pt interested in starting Skyrizi, we discussed remicade/humira, and stelara also.  Reviewed with patient the risk of hypersensitivity, serious infections, however there were more noted in the placebo group than in the treated group.  There were no malignancies in trial to date; there was one case of acute hepatitis (out of 1471 patient's).  Common side effects are URI, headache, and arthralgia.  I discussed with patient the risk of not treating disease is thought to carry more risk than treating.  CBC CMP 1 mo after starting   Nurse  navigator has been notified to begin process with insurance     Patient was given instructions and counseling regarding his condition or for health maintenance advice. Please see specific information pulled into the AVS if appropriate.

## 2024-06-19 NOTE — TELEPHONE ENCOUNTER
----- Message from Lucy Devi sent at 6/13/2024  3:29 PM EDT -----  Dion Espana    EGD 6/10/2024: Hiatal hernia, normal esophagus, normal stomach-biopsy with mild chronic inactive gastritis otherwise negative, normal duodenum  Colonoscopy 6/10/2024: Good prep, nonbleeding erosions at the ileocecal valve, benign-appearing intrinsic mild stenosis at the ileocecal valve was not traversed, biopsy taken-chronic ileocolitis with moderate to severe activity and ulcer, no granulom  as, terminal ileum appeared normal, diverticula found in the sigmoid    Please see if patient okay with beginning Stelara for Crohn's disease? Is he familiar with this-I think we talked about it some at his last visit

## 2024-06-25 DIAGNOSIS — K50.812 CROHN'S DISEASE OF BOTH SMALL AND LARGE INTESTINE WITH INTESTINAL OBSTRUCTION: Primary | ICD-10-CM

## 2024-06-25 PROBLEM — K50.80 CROHN'S DISEASE OF BOTH SMALL AND LARGE INTESTINE: Status: ACTIVE | Noted: 2024-06-25

## 2024-06-25 RX ORDER — DIPHENHYDRAMINE HCL 25 MG
50 CAPSULE ORAL ONCE
OUTPATIENT
Start: 2024-07-03 | End: 2024-07-03

## 2024-06-25 RX ORDER — DIPHENHYDRAMINE HYDROCHLORIDE 50 MG/ML
50 INJECTION INTRAMUSCULAR; INTRAVENOUS ONCE
OUTPATIENT
Start: 2024-07-03 | End: 2024-07-03

## 2024-07-11 ENCOUNTER — TELEPHONE (OUTPATIENT)
Dept: PHARMACY | Facility: HOSPITAL | Age: 69
End: 2024-07-11
Payer: MEDICARE

## 2024-07-11 NOTE — TELEPHONE ENCOUNTER
Called [employer] patient assistance program and spoke with Enid. Informed her that Rosibel at Dr. Devi's office asked me to call to get medication delivery scheduled. She said that medication will be delivered [employer] 8am-430pm. Will have card in box. If pink or red call to let them know. Should be white. Need to call 2 weeks prior to next dose, will not autoship. Patient approved through 12/31/2025 for free drug. They will send patient paperwork to re-enroll 2 months prior to expiration of program. She inquired about first dose. Informed her patient currently scheduled for 07/18, but can reschedule if not enough time to deliver due to the fact that we did not know patient was free drug. She said they will ship medication to arrive 07/17. Confirmed [address] [place] [address] address. Asked that they add [facility]IENT [facility] to delivery address.   clear

## 2024-07-12 ENCOUNTER — LAB (OUTPATIENT)
Dept: LAB | Facility: HOSPITAL | Age: 69
End: 2024-07-12
Payer: MEDICARE

## 2024-07-12 DIAGNOSIS — Z87.19 HISTORY OF SMALL BOWEL OBSTRUCTION: ICD-10-CM

## 2024-07-12 DIAGNOSIS — R93.3 ABNORMAL CT SCAN, GASTROINTESTINAL TRACT: ICD-10-CM

## 2024-07-12 DIAGNOSIS — K50.812 CROHN'S DISEASE OF BOTH SMALL AND LARGE INTESTINE WITH INTESTINAL OBSTRUCTION: ICD-10-CM

## 2024-07-12 LAB
ALBUMIN SERPL-MCNC: 4.1 G/DL (ref 3.5–5.2)
ALBUMIN/GLOB SERPL: 1.5 G/DL
ALP SERPL-CCNC: 101 U/L (ref 39–117)
ALT SERPL W P-5'-P-CCNC: 19 U/L (ref 1–41)
ANION GAP SERPL CALCULATED.3IONS-SCNC: 10.4 MMOL/L (ref 5–15)
AST SERPL-CCNC: 20 U/L (ref 1–40)
BILIRUB SERPL-MCNC: 0.8 MG/DL (ref 0–1.2)
BUN SERPL-MCNC: 13 MG/DL (ref 8–23)
BUN/CREAT SERPL: 15.5 (ref 7–25)
CALCIUM SPEC-SCNC: 9 MG/DL (ref 8.6–10.5)
CHLORIDE SERPL-SCNC: 105 MMOL/L (ref 98–107)
CO2 SERPL-SCNC: 24.6 MMOL/L (ref 22–29)
CREAT SERPL-MCNC: 0.84 MG/DL (ref 0.76–1.27)
DEPRECATED RDW RBC AUTO: 44.7 FL (ref 37–54)
EGFRCR SERPLBLD CKD-EPI 2021: 95 ML/MIN/1.73
ERYTHROCYTE [DISTWIDTH] IN BLOOD BY AUTOMATED COUNT: 13.8 % (ref 12.3–15.4)
GLOBULIN UR ELPH-MCNC: 2.8 GM/DL
GLUCOSE SERPL-MCNC: 92 MG/DL (ref 65–99)
HCT VFR BLD AUTO: 40.8 % (ref 37.5–51)
HGB BLD-MCNC: 13.5 G/DL (ref 13–17.7)
MCH RBC QN AUTO: 29.7 PG (ref 26.6–33)
MCHC RBC AUTO-ENTMCNC: 33.1 G/DL (ref 31.5–35.7)
MCV RBC AUTO: 89.7 FL (ref 79–97)
PLATELET # BLD AUTO: 257 10*3/MM3 (ref 140–450)
PMV BLD AUTO: 10 FL (ref 6–12)
POTASSIUM SERPL-SCNC: 3.9 MMOL/L (ref 3.5–5.2)
PROT SERPL-MCNC: 6.9 G/DL (ref 6–8.5)
RBC # BLD AUTO: 4.55 10*6/MM3 (ref 4.14–5.8)
SODIUM SERPL-SCNC: 140 MMOL/L (ref 136–145)
WBC NRBC COR # BLD AUTO: 6.27 10*3/MM3 (ref 3.4–10.8)

## 2024-07-12 PROCEDURE — 80053 COMPREHEN METABOLIC PANEL: CPT

## 2024-07-12 PROCEDURE — 85027 COMPLETE CBC AUTOMATED: CPT

## 2024-07-12 PROCEDURE — 36415 COLL VENOUS BLD VENIPUNCTURE: CPT

## 2024-07-18 ENCOUNTER — HOSPITAL ENCOUNTER (OUTPATIENT)
Dept: INFUSION THERAPY | Facility: HOSPITAL | Age: 69
Discharge: HOME | End: 2024-07-18
Payer: MEDICARE

## 2024-07-18 VITALS
RESPIRATION RATE: 20 BRPM | HEART RATE: 51 BPM | BODY MASS INDEX: 29.45 KG/M2 | HEIGHT: 70 IN | TEMPERATURE: 97.7 F | DIASTOLIC BLOOD PRESSURE: 69 MMHG | OXYGEN SATURATION: 97 % | WEIGHT: 205.69 LBS | SYSTOLIC BLOOD PRESSURE: 149 MMHG

## 2024-07-18 DIAGNOSIS — K50.812 CROHN'S DISEASE OF BOTH SMALL AND LARGE INTESTINE WITH INTESTINAL OBSTRUCTION: Primary | ICD-10-CM

## 2024-07-18 PROCEDURE — 25010000002 RISANKIZUMAB-RZAA 600 MG/10ML SOLUTION 10 ML VIAL: Performed by: NURSE PRACTITIONER

## 2024-07-18 PROCEDURE — 25810000003 SODIUM CHLORIDE 0.9 % SOLUTION 250 ML FLEX CONT: Performed by: NURSE PRACTITIONER

## 2024-07-18 PROCEDURE — 96365 THER/PROPH/DIAG IV INF INIT: CPT

## 2024-07-18 RX ORDER — DIPHENHYDRAMINE HYDROCHLORIDE 50 MG/ML
50 INJECTION INTRAMUSCULAR; INTRAVENOUS ONCE
OUTPATIENT
Start: 2024-08-09 | End: 2024-08-09

## 2024-07-18 RX ORDER — DIPHENHYDRAMINE HCL 25 MG
50 CAPSULE ORAL ONCE
OUTPATIENT
Start: 2024-08-09 | End: 2024-08-09

## 2024-07-18 RX ADMIN — SODIUM CHLORIDE 600 MG: 9 INJECTION, SOLUTION INTRAVENOUS at 09:37

## 2024-07-18 NOTE — CODE DOCUMENTATION
Latest Reference Range & Units 06/06/24 09:30   QUANTIFERON-TB GOLD PLUS Negative  Negative   QuantiFERON Criteria  Comment   QuantiFERON Incubation  Incubation performed.   QuantiFERON Mitogen Value IU/mL 5.02   QuantiFERON Nil Value IU/mL 0.04   QUANTIFERON TB1 AG VALUE IU/mL 0.05   QUANTIFERON TB2 AG VALUE IU/mL 0.04

## 2024-07-29 ENCOUNTER — OFFICE VISIT (OUTPATIENT)
Dept: FAMILY MEDICINE CLINIC | Facility: CLINIC | Age: 69
End: 2024-07-29
Payer: MEDICARE

## 2024-07-29 VITALS
RESPIRATION RATE: 18 BRPM | OXYGEN SATURATION: 97 % | HEART RATE: 51 BPM | BODY MASS INDEX: 29.78 KG/M2 | WEIGHT: 208 LBS | DIASTOLIC BLOOD PRESSURE: 69 MMHG | SYSTOLIC BLOOD PRESSURE: 135 MMHG | TEMPERATURE: 97 F | HEIGHT: 70 IN

## 2024-07-29 DIAGNOSIS — Z85.46 HISTORY OF PROSTATE CANCER: ICD-10-CM

## 2024-07-29 DIAGNOSIS — M10.9 GOUT, UNSPECIFIED CAUSE, UNSPECIFIED CHRONICITY, UNSPECIFIED SITE: ICD-10-CM

## 2024-07-29 DIAGNOSIS — E78.00 PURE HYPERCHOLESTEROLEMIA: ICD-10-CM

## 2024-07-29 DIAGNOSIS — N52.9 ERECTILE DYSFUNCTION, UNSPECIFIED ERECTILE DYSFUNCTION TYPE: ICD-10-CM

## 2024-07-29 DIAGNOSIS — I10 BENIGN ESSENTIAL HYPERTENSION: Primary | ICD-10-CM

## 2024-07-29 DIAGNOSIS — T78.40XD ALLERGY, SUBSEQUENT ENCOUNTER: ICD-10-CM

## 2024-07-29 DIAGNOSIS — K50.919 CROHN'S DISEASE WITH COMPLICATION, UNSPECIFIED GASTROINTESTINAL TRACT LOCATION: ICD-10-CM

## 2024-07-29 LAB
CHOLEST SERPL-MCNC: 140 MG/DL (ref 0–200)
HDLC SERPL-MCNC: 31 MG/DL (ref 40–60)
LDLC SERPL CALC-MCNC: 59 MG/DL (ref 0–100)
LDLC/HDLC SERPL: 1.45 {RATIO}
PSA SERPL-MCNC: 0.28 NG/ML (ref 0–4)
TRIGL SERPL-MCNC: 320 MG/DL (ref 0–150)
TSH SERPL DL<=0.05 MIU/L-ACNC: 3.38 UIU/ML (ref 0.27–4.2)
URATE SERPL-MCNC: 3.9 MG/DL (ref 3.4–7)
VLDLC SERPL-MCNC: 50 MG/DL (ref 5–40)

## 2024-07-29 PROCEDURE — 84550 ASSAY OF BLOOD/URIC ACID: CPT | Performed by: NURSE PRACTITIONER

## 2024-07-29 PROCEDURE — 99214 OFFICE O/P EST MOD 30 MIN: CPT | Performed by: NURSE PRACTITIONER

## 2024-07-29 PROCEDURE — 84153 ASSAY OF PSA TOTAL: CPT | Performed by: NURSE PRACTITIONER

## 2024-07-29 PROCEDURE — 1125F AMNT PAIN NOTED PAIN PRSNT: CPT | Performed by: NURSE PRACTITIONER

## 2024-07-29 PROCEDURE — 84443 ASSAY THYROID STIM HORMONE: CPT | Performed by: NURSE PRACTITIONER

## 2024-07-29 PROCEDURE — 3075F SYST BP GE 130 - 139MM HG: CPT | Performed by: NURSE PRACTITIONER

## 2024-07-29 PROCEDURE — 3078F DIAST BP <80 MM HG: CPT | Performed by: NURSE PRACTITIONER

## 2024-07-29 PROCEDURE — 80061 LIPID PANEL: CPT | Performed by: NURSE PRACTITIONER

## 2024-07-29 PROCEDURE — 1159F MED LIST DOCD IN RCRD: CPT | Performed by: NURSE PRACTITIONER

## 2024-07-29 PROCEDURE — G2211 COMPLEX E/M VISIT ADD ON: HCPCS | Performed by: NURSE PRACTITIONER

## 2024-07-29 PROCEDURE — 1160F RVW MEDS BY RX/DR IN RCRD: CPT | Performed by: NURSE PRACTITIONER

## 2024-07-29 RX ORDER — METOPROLOL SUCCINATE 50 MG/1
50 TABLET, EXTENDED RELEASE ORAL DAILY
Qty: 90 TABLET | Refills: 1 | Status: SHIPPED | OUTPATIENT
Start: 2024-07-29

## 2024-07-29 RX ORDER — ALLOPURINOL 300 MG/1
300 TABLET ORAL DAILY
Qty: 90 TABLET | Refills: 1 | Status: SHIPPED | OUTPATIENT
Start: 2024-07-29

## 2024-07-29 RX ORDER — METOPROLOL SUCCINATE 100 MG/1
100 TABLET, EXTENDED RELEASE ORAL DAILY
Qty: 90 TABLET | Refills: 1 | Status: CANCELLED | OUTPATIENT
Start: 2024-07-29

## 2024-07-29 RX ORDER — SILDENAFIL 100 MG/1
100 TABLET, FILM COATED ORAL DAILY PRN
Qty: 30 TABLET | Refills: 3 | Status: SHIPPED | OUTPATIENT
Start: 2024-07-29

## 2024-07-29 RX ORDER — MONTELUKAST SODIUM 10 MG/1
10 TABLET ORAL NIGHTLY
Qty: 90 TABLET | Refills: 1 | Status: SHIPPED | OUTPATIENT
Start: 2024-07-29

## 2024-07-29 RX ORDER — ATORVASTATIN CALCIUM 20 MG/1
20 TABLET, FILM COATED ORAL NIGHTLY
Qty: 90 TABLET | Refills: 1 | Status: SHIPPED | OUTPATIENT
Start: 2024-07-29

## 2024-07-29 RX ORDER — AMLODIPINE BESYLATE AND BENAZEPRIL HYDROCHLORIDE 5; 20 MG/1; MG/1
1 CAPSULE ORAL DAILY
Qty: 90 CAPSULE | Refills: 1 | Status: SHIPPED | OUTPATIENT
Start: 2024-07-29

## 2024-07-29 NOTE — PROGRESS NOTES
Chief Complaint  Hyperlipidemia, Hypertension, and Gout    Subjective          Dion Espana presents to [facility]  History of Present Illness  Hypertension:  Patient is taking Amlodipine Benazepril, Metoprolol.    No chest pain or shortness of breath.    Hyperlipidemia:  He is taking atorvastatin, Fish Oil.    Gout:  He is on allopurinol with good control--no flares recently.    Allergies: He is on singular and flonase and doing ok with his medications.   ED/Prostate Cancer (removed):  He takes the viagra as needed and has good results when using.   Colitis/GI: He is taking his infusions.  He did not quite understand why he is got this because he really does not get diarrhea or any major issues that way.    Depression: Not at risk (1/29/2024)    PHQ-2     PHQ-2 Score: 0    and 1/29/2024    BMI is >= 25 and <30. (Overweight) The following options were offered after discussion;: exercise counseling/recommendations and nutrition counseling/recommendations         Allergies  Patient has no known allergies.    Social History     Tobacco Use    Smoking status: Never    Smokeless tobacco: Never   Vaping Use    Vaping status: Never Used   Substance Use Topics    Alcohol use: Not Currently    Drug use: Never       Family History   Problem Relation Age of Onset    Arthritis Mother     Stroke Mother     Heart disease Mother     Diabetes Mother     Heart disease Father     Stroke Brother     Colon cancer Neg Hx         There are no preventive care reminders to display for this patient.       Immunization History   Administered Date(s) Administered    COVID-19 (PFIZER) BIVALENT 12+YRS 01/12/2023    COVID-19 (PFIZER) Purple Cap Monovalent 03/15/2021, 04/05/2021, 11/29/2021    COVID-19 (UNSPECIFIED) 01/08/2024    Covid-19 (Pfizer) Gray Cap Monovalent 08/24/2022    Fluzone (or Fluarix & Flulaval for VFC) >6mos 10/30/2019    Fluzone High-Dose 65+yrs 10/13/2022, 11/21/2023    Fluzone Quad  ">6mos (Multi-dose) 10/30/2019, 10/15/2020    Hepatitis A 07/31/2018, 03/14/2019    Pneumococcal Conjugate 13-Valent (PCV13) 01/25/2022    Pneumococcal Conjugate 20-Valent (PCV20) 02/02/2023    RSV, unspecified (Vaccine or MAB) 01/08/2024    Shingrix 02/26/2018, 08/10/2018       Review of Systems   Constitutional:  Negative for fatigue.   Respiratory:  Negative for cough and shortness of breath.    Cardiovascular:  Negative for chest pain.   Gastrointestinal:  Negative for diarrhea, nausea and vomiting.        Objective       Vitals:    07/29/24 0721   BP: 135/69   Pulse: 51   Resp: 18   Temp: 97 °F (36.1 °C)   SpO2: 97%   Weight: 94.3 kg (208 lb)   Height: 177.8 cm (70\")       Body mass index is 29.84 kg/m².         Physical Exam  Vitals reviewed.   Constitutional:       Appearance: Normal appearance. He is well-developed.   Cardiovascular:      Rate and Rhythm: Normal rate and regular rhythm.      Heart sounds: Normal heart sounds. No murmur heard.  Pulmonary:      Effort: Pulmonary effort is normal.      Breath sounds: Normal breath sounds.   Neurological:      Mental Status: He is alert and oriented to person, place, and time.      Cranial Nerves: No cranial nerve deficit.      Motor: No weakness.   Psychiatric:         Mood and Affect: Mood and affect normal.               Result Review :     The following data was reviewed by: NICOLAS Davies on 07/29/2024:    Common Labs   Common labs          5/5/2024    03:55 5/14/2024    09:04 7/12/2024    07:59   Common Labs   Glucose 122  79  92    BUN 17  17  13    Creatinine 0.88  0.96  0.84    Sodium 138  139  140    Potassium 3.9  3.9  3.9    Chloride 103  102  105    Calcium 8.3  8.4  9.0    Albumin  4.0  4.1    Total Bilirubin  0.6  0.8    Alkaline Phosphatase  100  101    AST (SGOT)  15  20    ALT (SGPT)  32  19    WBC 7.20  9.39  6.27    Hemoglobin 11.6  14.0  13.5    Hematocrit 34.1  42.8  40.8    Platelets 224  340  257            NM Bone Scan Whole " Body    Result Date: 6/13/2024  1. No convincing scintigraphic evidence of bony metastatic disease. Electronically Signed: Byron Schrader MD  6/13/2024 1:49 PM EDT  Workstation ID: [id number]    CT Enterography Abdomen Pelvis w Contrast    Result Date: 5/3/2024  Impression: 1.  Suggestion of terminal ileitis with more proximal partial small bowel obstruction appears similar to recent prior CT. 2.  Stable sclerotic lesion involving T11 vertebral body, which could represent a bone island or sclerotic metastasis given history of prostate cancer.    Electronically Signed By-Farhat Diaz MD On:5/3/2024 1:55 PM      XR Abdomen Flat & Upright    Result Date: 5/3/2024  No free air identified. Small bowel distention is slightly improved in the interval.  Electronically Signed By-Dr. Eddie Rowley MD On:5/3/2024 5:16 AM      XR Abdomen KUB    Result Date: 5/2/2024  Impression: NG tube tip in the stomach. Side port at the GE junction. Suggest advancing 5 cm.   Electronically Signed By-ZINA REYNA MD On:5/2/2024 4:18 PM      XR Abdomen KUB    Result Date: 5/2/2024  Impression: Antegrade oriented gastric tube projects over the GE junction. Consider further advancement. Mild to moderately dilated loops of bowel in keeping with suspected partial obstruction. Cholecystectomy clips.   Electronically Signed By-Linus Rose MD On:5/2/2024 3:17 PM      CT Abdomen Pelvis Without Contrast    Result Date: 5/2/2024  Impression: CT scan of the abdomen and pelvis without contrast demonstrating fatty mural infiltration of the distal 15 cm of the terminal ileum suggesting chronic inflammatory bowel disease. Abnormal dilatation of proximal small bowel loops is consistent with partial obstruction.  I discussed findings with Ms. Stevie Huerta at 1040.      Electronically Signed By-ZACH FRASER MD On:5/2/2024 11:04 AM                  Assessment and Plan      Assessment & Plan  Benign essential hypertension  We will decrease the  toprol from 100mg to 50mg to help the heart rate but check BP and heart rate daily for 2 weeks and let me know how your doing.    Gout, unspecified cause, unspecified chronicity, unspecified site    Pure hypercholesterolemia     Allergy, subsequent encounter    Erectile dysfunction, unspecified erectile dysfunction type    History of prostate cancer  We will do the PSA diagnostic today for his appointment with Dr. Cardin Crohn's disease with complication, unspecified gastrointestinal tract location  Continue seeing GI    Orders Placed This Encounter   Procedures    TSH    Lipid Panel    Uric Acid    PSA DIAGNOSTIC     New Medications Ordered This Visit   Medications    allopurinol (ZYLOPRIM) 300 MG tablet     Sig: Take 1 tablet by mouth Daily.     Dispense:  90 tablet     Refill:  1    amLODIPine-benazepril (LOTREL 5-20) 5-20 MG per capsule     Sig: Take 1 capsule by mouth Daily.     Dispense:  90 capsule     Refill:  1    atorvastatin (LIPITOR) 20 MG tablet     Sig: Take 1 tablet by mouth Every Night.     Dispense:  90 tablet     Refill:  1    montelukast (Singulair) 10 MG tablet     Sig: Take 1 tablet by mouth Every Night.     Dispense:  90 tablet     Refill:  1    sildenafil (Viagra) 100 MG tablet     Sig: Take 1 tablet by mouth Daily As Needed for Erectile Dysfunction.     Dispense:  30 tablet     Refill:  3    metoprolol succinate XL (Toprol XL) 50 MG 24 hr tablet     Sig: Take 1 tablet by mouth Daily.     Dispense:  90 tablet     Refill:  1          Diagnosis Plan   1. Benign essential hypertension  amLODIPine-benazepril (LOTREL 5-20) 5-20 MG per capsule    metoprolol succinate XL (Toprol XL) 50 MG 24 hr tablet      2. Gout, unspecified cause, unspecified chronicity, unspecified site  allopurinol (ZYLOPRIM) 300 MG tablet    Uric Acid      3. Pure hypercholesterolemia  TSH    Lipid Panel    atorvastatin (LIPITOR) 20 MG tablet      4. Allergy, subsequent encounter  montelukast (Singulair) 10 MG tablet      5.  Erectile dysfunction, unspecified erectile dysfunction type  sildenafil (Viagra) 100 MG tablet      6. History of prostate cancer  PSA DIAGNOSTIC      7. Crohn's disease with complication, unspecified gastrointestinal tract location                  Follow Up     Return in about 6 months (around 1/29/2025) for Medicare Wellness.  Follow-up in 6 months for labs and appt. Call with any concerns or questions that you may have regarding your medications or history.      Patient was given instructions and counseling regarding his condition or for health maintenance advice. Please see specific information pulled into the AVS if appropriate.     Parts of this note are electronic transcriptions/translations of spoken language to printed text using the Dragon Dictation system.          Kenia Mclean, APRN  07/29/2024

## 2024-07-29 NOTE — ASSESSMENT & PLAN NOTE
We will decrease the toprol from 100mg to 50mg to help the heart rate but check BP and heart rate daily for 2 weeks and let me know how your doing.

## 2024-08-05 ENCOUNTER — OFFICE VISIT (OUTPATIENT)
Dept: UROLOGY | Facility: CLINIC | Age: 69
End: 2024-08-05
Payer: MEDICARE

## 2024-08-05 VITALS
SYSTOLIC BLOOD PRESSURE: 137 MMHG | DIASTOLIC BLOOD PRESSURE: 73 MMHG | WEIGHT: 208 LBS | HEIGHT: 70 IN | BODY MASS INDEX: 29.78 KG/M2

## 2024-08-05 DIAGNOSIS — C61 PROSTATE CANCER: Primary | ICD-10-CM

## 2024-08-05 LAB
BILIRUB BLD-MCNC: NEGATIVE MG/DL
CLARITY, POC: CLEAR
COLOR UR: YELLOW
EXPIRATION DATE: NORMAL
GLUCOSE UR STRIP-MCNC: NEGATIVE MG/DL
KETONES UR QL: NEGATIVE
LEUKOCYTE EST, POC: NEGATIVE
Lab: NORMAL
NITRITE UR-MCNC: NEGATIVE MG/ML
PH UR: 5.5 [PH] (ref 5–8)
PROT UR STRIP-MCNC: NEGATIVE MG/DL
RBC # UR STRIP: NEGATIVE /UL
SP GR UR: 1.02 (ref 1–1.03)
UROBILINOGEN UR QL: NORMAL

## 2024-08-05 PROCEDURE — 1159F MED LIST DOCD IN RCRD: CPT | Performed by: UROLOGY

## 2024-08-05 PROCEDURE — 99213 OFFICE O/P EST LOW 20 MIN: CPT | Performed by: UROLOGY

## 2024-08-05 PROCEDURE — 3075F SYST BP GE 130 - 139MM HG: CPT | Performed by: UROLOGY

## 2024-08-05 PROCEDURE — 81003 URINALYSIS AUTO W/O SCOPE: CPT | Performed by: UROLOGY

## 2024-08-05 PROCEDURE — 3078F DIAST BP <80 MM HG: CPT | Performed by: UROLOGY

## 2024-08-05 PROCEDURE — 1160F RVW MEDS BY RX/DR IN RCRD: CPT | Performed by: UROLOGY

## 2024-08-05 NOTE — PROGRESS NOTES
"Chief Complaint  Prostate Cancer    Subjective          Dion Espana presents to [facility]  History of Present Illness  The patient presents today for a follow-up.     The patient is doing well. His PSA is slowly rising. He reports that he has under gone a prostatectomy in 2016.         History of Present Illness  67 yo male s/p RALP with bilateral PLND for intermediate risk prostate cancer.   Surgery in Sept 2016  PSA 4.7  Biopsy - 3+4 L mid and 3+3 R base  cT1c     Final Path - Surgery in Sept.   [place] 3+4 ([facility]arely Group 2)  [id number]  margins were negative     His PSA is slowly rising.   1 year ago it was 0.165 and then was 0.180 6 months ago.  In October 2023 it was 0.170.  In April 2024 it was 0.240.  It is now 0.281.     He has no other complaints.        Objective   Vital Signs:   /73   Ht 177.8 cm (70\")   Wt 94.3 kg (208 lb)   BMI 29.84 kg/m²       Physical Exam  Vitals and nursing note reviewed.   Constitutional:       Appearance: Normal appearance. He is well-developed.   Pulmonary:      Effort: Pulmonary effort is normal.      Breath sounds: Normal air entry.   Neurological:      Mental Status: He is alert and oriented to person, place, and time.      Motor: Motor function is intact.   Psychiatric:         Mood and Affect: Mood normal.         Behavior: Behavior normal.          Result Review :   The following data was reviewed by: Silvana Alonso MD on 08/05/2024:    Results for orders placed or performed in visit on 08/05/24   POC Urinalysis Dipstick, Automated    Specimen: Urine   Result Value Ref Range    Color Yellow Yellow, Straw, Dark Yellow, Marce    Clarity, UA Clear Clear    Specific Gravity  1.025 1.005 - 1.030    pH, Urine 5.5 5.0 - 8.0    Leukocytes Negative Negative    Nitrite, UA Negative Negative    Protein, POC Negative Negative mg/dL    Glucose, UA Negative Negative mg/dL    Ketones, UA Negative Negative    Urobilinogen, UA 0.2 E.U./dL " Normal, 0.2 E.U./dL    Bilirubin Negative Negative    Blood, UA Negative Negative    Lot Number [id number]     Expiration Date 62,025        PSA          4/15/2024    10:26 5/2/2024    10:54 7/29/2024    08:03   PSA   PSA 0.240  0.257  0.281         Results    Procedure Component Value Ref Range Date/Time   NM Bone Scan Whole Body [[id number]] Collected: 06/13/24 1347   Order Status: Completed Updated: 06/13/24 1351   Narrative:     DATE OF EXAM: 6/12/2024 11:57 AM EDT    PROCEDURE: NM BONE SCAN WHOLE BODY    INDICATIONS: prostate cancer    COMPARISON: 2/20/2020    TECHNIQUE: The patient received 22.3 mCi of technetium 99m MDP intravenously and 3 hour delayed anterior and posterior whole body bone images were obtained.    FINDINGS:  Again seen are areas of mild increased activity within the bilateral AC joints and sternoclavicular joints as well as bilateral knees and feet consistent with degenerative change. There is also some mild increased uptake within the mid thoracic spine and   lower lumbar spine all of which would be consistent with degenerative change. No new focal area of abnormal uptake identified that would be suspicious for metastatic disease.   Impression:       1. No convincing scintigraphic evidence of bony metastatic disease.      Electronically Signed: Byron Schrader MD   6/13/2024 1:49 PM EDT   Workstation ID: [id number]         Study Result    Narrative & Impression   CT ABDOMEN PELVIS W CONTRAST ENTEROGRAPHY-     Date of Exam: 5/3/2024 12:05 PM     Indication: Terminal Ileitis on CT without contrast. Evaluation for IBD;  K56.600-Partial intestinal obstruction, unspecified as to cause;  R11.2-Nausea with vomiting, unspecified.     Comparison: Abdominal radiograph from earlier today and CT abdomen  pelvis from May 2, 2024     Technique: Axial CT images were obtained of the abdomen and pelvis after  the uneventful intravenous administration of 100 mL Isovue-370.Negative  oral contrast was also  administered for the enterography protocol.   Reconstructed coronal and sagittal images were also obtained. Automated  exposure control and iterative construction methods were used.        Findings:  Within the lung bases is mild bibasilar atelectasis.     The liver, adrenal glands, kidneys, spleen, and pancreas are  unremarkable. The gallbladder is surgically absent.     An NG tube has its tip in the stomach. The stomach is moderately  distended. Submucosal fat with wall thickening of the terminal ileum  appears similar to prior exam, with several mildly dilated more proximal  small bowel loops also similar to recent prior CT. The appendix is not  well-visualized. There is no ascites or loculated collection. No  abnormally enlarged lymph nodes are identified.     The rectum and urinary bladder are unremarkable. The prostate is absent.        There is a stable prominent sclerotic lesion involving the T11 vertebral  body.     IMPRESSION:  Impression:  1.  Suggestion of terminal ileitis with more proximal partial small  bowel obstruction appears similar to recent prior CT.  2.  Stable sclerotic lesion involving T11 vertebral body, which could  represent a bone island or sclerotic metastasis given history of  prostate cancer.           Electronically Signed By-Farhat Diaz MD On:5/3/2024 1:55 PM        Assessment and Plan    Diagnoses and all orders for this visit:    1. Prostate cancer (Primary)  -     POC Urinalysis Dipstick, Automated  -     PSA DIAGNOSTIC; Future    Recheck a PSA and follow-up in 3 months.  At this point he wants to hold off on hormone therapy and I think it is reasonable given the very slow rise of his PSA and recent negative CT scan and bone scan.  We discussed that his PSA is low enough that a PMS a scan will likely not show anything either.  Will recheck in 3 months.            Follow Up       No follow-ups on file.  Patient was given instructions and counseling regarding his condition or  for health maintenance advice. Please see specific information pulled into the AVS if appropriate.

## 2024-08-09 DIAGNOSIS — T78.40XD ALLERGY, SUBSEQUENT ENCOUNTER: ICD-10-CM

## 2024-08-09 DIAGNOSIS — M10.9 GOUT, UNSPECIFIED CAUSE, UNSPECIFIED CHRONICITY, UNSPECIFIED SITE: ICD-10-CM

## 2024-08-09 DIAGNOSIS — I10 BENIGN ESSENTIAL HYPERTENSION: ICD-10-CM

## 2024-08-09 DIAGNOSIS — E78.00 PURE HYPERCHOLESTEROLEMIA: ICD-10-CM

## 2024-08-09 RX ORDER — AMLODIPINE BESYLATE AND BENAZEPRIL HYDROCHLORIDE 5; 20 MG/1; MG/1
1 CAPSULE ORAL DAILY
Qty: 90 CAPSULE | Refills: 1 | OUTPATIENT
Start: 2024-08-09

## 2024-08-09 RX ORDER — MONTELUKAST SODIUM 10 MG/1
10 TABLET ORAL
Qty: 90 TABLET | Refills: 1 | OUTPATIENT
Start: 2024-08-09

## 2024-08-09 RX ORDER — METOPROLOL SUCCINATE 100 MG/1
100 TABLET, EXTENDED RELEASE ORAL DAILY
Qty: 90 TABLET | Refills: 1 | OUTPATIENT
Start: 2024-08-09

## 2024-08-09 RX ORDER — ALLOPURINOL 300 MG/1
300 TABLET ORAL DAILY
Qty: 90 TABLET | Refills: 1 | OUTPATIENT
Start: 2024-08-09

## 2024-08-09 RX ORDER — ATORVASTATIN CALCIUM 20 MG/1
20 TABLET, FILM COATED ORAL
Qty: 90 TABLET | Refills: 1 | OUTPATIENT
Start: 2024-08-09

## 2024-08-12 ENCOUNTER — PATIENT ROUNDING (BHMG ONLY) (OUTPATIENT)
Dept: FAMILY MEDICINE CLINIC | Facility: CLINIC | Age: 69
End: 2024-08-12
Payer: MEDICARE

## 2024-08-12 NOTE — PROGRESS NOTES
A My-Chart message has been sent to the patient for PATIENT ROUNDING with [facility].

## 2024-08-14 ENCOUNTER — TELEPHONE (OUTPATIENT)
Dept: FAMILY MEDICINE CLINIC | Facility: CLINIC | Age: 69
End: 2024-08-14
Payer: MEDICARE

## 2024-08-14 DIAGNOSIS — M10.9 GOUT, UNSPECIFIED CAUSE, UNSPECIFIED CHRONICITY, UNSPECIFIED SITE: ICD-10-CM

## 2024-08-14 DIAGNOSIS — N52.9 ERECTILE DYSFUNCTION, UNSPECIFIED ERECTILE DYSFUNCTION TYPE: ICD-10-CM

## 2024-08-14 DIAGNOSIS — I10 BENIGN ESSENTIAL HYPERTENSION: ICD-10-CM

## 2024-08-14 DIAGNOSIS — E78.00 PURE HYPERCHOLESTEROLEMIA: ICD-10-CM

## 2024-08-14 DIAGNOSIS — T78.40XD ALLERGY, SUBSEQUENT ENCOUNTER: ICD-10-CM

## 2024-08-14 RX ORDER — MONTELUKAST SODIUM 10 MG/1
10 TABLET ORAL NIGHTLY
Qty: 90 TABLET | Refills: 1 | Status: SHIPPED | OUTPATIENT
Start: 2024-08-14

## 2024-08-14 RX ORDER — SILDENAFIL 100 MG/1
100 TABLET, FILM COATED ORAL DAILY PRN
Qty: 30 TABLET | Refills: 3 | Status: SHIPPED | OUTPATIENT
Start: 2024-08-14

## 2024-08-14 RX ORDER — ALLOPURINOL 300 MG/1
300 TABLET ORAL DAILY
Qty: 90 TABLET | Refills: 1 | Status: SHIPPED | OUTPATIENT
Start: 2024-08-14

## 2024-08-14 RX ORDER — METOPROLOL SUCCINATE 50 MG/1
50 TABLET, EXTENDED RELEASE ORAL DAILY
Qty: 90 TABLET | Refills: 1 | Status: SHIPPED | OUTPATIENT
Start: 2024-08-14

## 2024-08-14 RX ORDER — ATORVASTATIN CALCIUM 20 MG/1
20 TABLET, FILM COATED ORAL NIGHTLY
Qty: 90 TABLET | Refills: 1 | Status: SHIPPED | OUTPATIENT
Start: 2024-08-14

## 2024-08-14 RX ORDER — AMLODIPINE BESYLATE AND BENAZEPRIL HYDROCHLORIDE 5; 20 MG/1; MG/1
1 CAPSULE ORAL DAILY
Qty: 90 CAPSULE | Refills: 1 | Status: SHIPPED | OUTPATIENT
Start: 2024-08-14

## 2024-08-14 NOTE — TELEPHONE ENCOUNTER
Caller: Dion Espana    Relationship: Self    Best call back number: [phone]     What is the best time to reach you: ANY    Who are you requesting to speak with (clinical staff, provider,  specific staff member): CLINICAL    What was the call regarding: PATIENT CALLED TO FOLLOW UP ON MEDICATION REQUEST FROM 08.12.24. PATIENT STATED HE IS COMPLETELY OUT OF THE MEDICATIONS

## 2024-08-15 ENCOUNTER — HOSPITAL ENCOUNTER (OUTPATIENT)
Dept: INFUSION THERAPY | Facility: HOSPITAL | Age: 69
Discharge: HOME | End: 2024-08-15
Admitting: NURSE PRACTITIONER
Payer: MEDICARE

## 2024-08-15 VITALS
TEMPERATURE: 97.6 F | WEIGHT: 207.67 LBS | OXYGEN SATURATION: 96 % | DIASTOLIC BLOOD PRESSURE: 68 MMHG | BODY MASS INDEX: 29.73 KG/M2 | SYSTOLIC BLOOD PRESSURE: 128 MMHG | HEART RATE: 57 BPM | HEIGHT: 70 IN | RESPIRATION RATE: 20 BRPM

## 2024-08-15 DIAGNOSIS — K50.812 CROHN'S DISEASE OF BOTH SMALL AND LARGE INTESTINE WITH INTESTINAL OBSTRUCTION: Primary | ICD-10-CM

## 2024-08-15 LAB
ALBUMIN SERPL-MCNC: 4.1 G/DL (ref 3.5–5.2)
ALBUMIN/GLOB SERPL: 1.4 G/DL
ALP SERPL-CCNC: 99 U/L (ref 39–117)
ALT SERPL W P-5'-P-CCNC: 18 U/L (ref 1–41)
ANION GAP SERPL CALCULATED.3IONS-SCNC: 11.6 MMOL/L (ref 5–15)
AST SERPL-CCNC: 20 U/L (ref 1–40)
BILIRUB SERPL-MCNC: 0.8 MG/DL (ref 0–1.2)
BUN SERPL-MCNC: 14 MG/DL (ref 8–23)
BUN/CREAT SERPL: 13.9 (ref 7–25)
CALCIUM SPEC-SCNC: 8.9 MG/DL (ref 8.6–10.5)
CHLORIDE SERPL-SCNC: 102 MMOL/L (ref 98–107)
CO2 SERPL-SCNC: 26.4 MMOL/L (ref 22–29)
CREAT SERPL-MCNC: 1.01 MG/DL (ref 0.76–1.27)
EGFRCR SERPLBLD CKD-EPI 2021: 81 ML/MIN/1.73
GLOBULIN UR ELPH-MCNC: 3 GM/DL
GLUCOSE SERPL-MCNC: 134 MG/DL (ref 65–99)
POTASSIUM SERPL-SCNC: 3.8 MMOL/L (ref 3.5–5.2)
PROT SERPL-MCNC: 7.1 G/DL (ref 6–8.5)
SODIUM SERPL-SCNC: 140 MMOL/L (ref 136–145)

## 2024-08-15 PROCEDURE — 25810000003 SODIUM CHLORIDE 0.9 % SOLUTION 250 ML FLEX CONT: Performed by: NURSE PRACTITIONER

## 2024-08-15 PROCEDURE — 36415 COLL VENOUS BLD VENIPUNCTURE: CPT

## 2024-08-15 PROCEDURE — 96365 THER/PROPH/DIAG IV INF INIT: CPT

## 2024-08-15 PROCEDURE — 25010000002 RISANKIZUMAB-RZAA 600 MG/10ML SOLUTION 10 ML VIAL: Performed by: NURSE PRACTITIONER

## 2024-08-15 PROCEDURE — 80053 COMPREHEN METABOLIC PANEL: CPT | Performed by: NURSE PRACTITIONER

## 2024-08-15 RX ORDER — DIPHENHYDRAMINE HYDROCHLORIDE 50 MG/ML
50 INJECTION INTRAMUSCULAR; INTRAVENOUS ONCE
OUTPATIENT
Start: 2024-09-06 | End: 2024-09-06

## 2024-08-15 RX ORDER — DIPHENHYDRAMINE HCL 25 MG
50 CAPSULE ORAL ONCE
OUTPATIENT
Start: 2024-09-06 | End: 2024-09-06

## 2024-08-15 RX ADMIN — SODIUM CHLORIDE 600 MG: 9 INJECTION, SOLUTION INTRAVENOUS at 09:34

## 2024-09-12 ENCOUNTER — HOSPITAL ENCOUNTER (OUTPATIENT)
Dept: INFUSION THERAPY | Facility: HOSPITAL | Age: 69
Discharge: HOME | End: 2024-09-12
Payer: MEDICARE

## 2024-09-12 VITALS
HEIGHT: 70 IN | BODY MASS INDEX: 29.98 KG/M2 | SYSTOLIC BLOOD PRESSURE: 124 MMHG | OXYGEN SATURATION: 100 % | DIASTOLIC BLOOD PRESSURE: 71 MMHG | RESPIRATION RATE: 17 BRPM | WEIGHT: 209.44 LBS | TEMPERATURE: 98.1 F | HEART RATE: 52 BPM

## 2024-09-12 DIAGNOSIS — K50.812 CROHN'S DISEASE OF BOTH SMALL AND LARGE INTESTINE WITH INTESTINAL OBSTRUCTION: Primary | ICD-10-CM

## 2024-09-12 LAB
ALBUMIN SERPL-MCNC: 4 G/DL (ref 3.5–5.2)
ALBUMIN/GLOB SERPL: 1.3 G/DL
ALP SERPL-CCNC: 107 U/L (ref 39–117)
ALT SERPL W P-5'-P-CCNC: 20 U/L (ref 1–41)
ANION GAP SERPL CALCULATED.3IONS-SCNC: 10 MMOL/L (ref 5–15)
AST SERPL-CCNC: 20 U/L (ref 1–40)
BILIRUB SERPL-MCNC: 0.8 MG/DL (ref 0–1.2)
BUN SERPL-MCNC: 14 MG/DL (ref 8–23)
BUN/CREAT SERPL: 13.7 (ref 7–25)
CALCIUM SPEC-SCNC: 9 MG/DL (ref 8.6–10.5)
CHLORIDE SERPL-SCNC: 105 MMOL/L (ref 98–107)
CO2 SERPL-SCNC: 26 MMOL/L (ref 22–29)
CREAT SERPL-MCNC: 1.02 MG/DL (ref 0.76–1.27)
EGFRCR SERPLBLD CKD-EPI 2021: 80.1 ML/MIN/1.73
GLOBULIN UR ELPH-MCNC: 3.2 GM/DL
GLUCOSE SERPL-MCNC: 98 MG/DL (ref 65–99)
POTASSIUM SERPL-SCNC: 4.2 MMOL/L (ref 3.5–5.2)
PROT SERPL-MCNC: 7.2 G/DL (ref 6–8.5)
SODIUM SERPL-SCNC: 141 MMOL/L (ref 136–145)

## 2024-09-12 PROCEDURE — 96365 THER/PROPH/DIAG IV INF INIT: CPT

## 2024-09-12 PROCEDURE — 36415 COLL VENOUS BLD VENIPUNCTURE: CPT

## 2024-09-12 PROCEDURE — 25810000003 SODIUM CHLORIDE 0.9 % SOLUTION 250 ML FLEX CONT: Performed by: NURSE PRACTITIONER

## 2024-09-12 PROCEDURE — 25010000002 RISANKIZUMAB-RZAA 600 MG/10ML SOLUTION 10 ML VIAL: Performed by: NURSE PRACTITIONER

## 2024-09-12 PROCEDURE — 80053 COMPREHEN METABOLIC PANEL: CPT | Performed by: NURSE PRACTITIONER

## 2024-09-12 RX ORDER — DIPHENHYDRAMINE HCL 25 MG
50 CAPSULE ORAL ONCE
OUTPATIENT
Start: 2024-09-12 | End: 2024-09-12

## 2024-09-12 RX ORDER — DIPHENHYDRAMINE HYDROCHLORIDE 50 MG/ML
50 INJECTION INTRAMUSCULAR; INTRAVENOUS ONCE
OUTPATIENT
Start: 2024-09-12 | End: 2024-09-12

## 2024-09-12 RX ADMIN — SODIUM CHLORIDE 600 MG: 9 INJECTION, SOLUTION INTRAVENOUS at 09:17

## 2024-10-01 ENCOUNTER — OFFICE VISIT (OUTPATIENT)
Dept: FAMILY MEDICINE CLINIC | Facility: CLINIC | Age: 69
End: 2024-10-01
Payer: MEDICARE

## 2024-10-01 ENCOUNTER — OFFICE VISIT (OUTPATIENT)
Dept: GASTROENTEROLOGY | Facility: CLINIC | Age: 69
End: 2024-10-01
Payer: MEDICARE

## 2024-10-01 VITALS
HEIGHT: 70 IN | HEART RATE: 64 BPM | SYSTOLIC BLOOD PRESSURE: 132 MMHG | WEIGHT: 215.2 LBS | BODY MASS INDEX: 30.81 KG/M2 | DIASTOLIC BLOOD PRESSURE: 63 MMHG

## 2024-10-01 VITALS
TEMPERATURE: 98 F | SYSTOLIC BLOOD PRESSURE: 134 MMHG | HEART RATE: 71 BPM | OXYGEN SATURATION: 95 % | DIASTOLIC BLOOD PRESSURE: 70 MMHG

## 2024-10-01 DIAGNOSIS — K50.812 CROHN'S DISEASE OF BOTH SMALL AND LARGE INTESTINE WITH INTESTINAL OBSTRUCTION: Primary | ICD-10-CM

## 2024-10-01 DIAGNOSIS — R05.1 ACUTE COUGH: Primary | ICD-10-CM

## 2024-10-01 DIAGNOSIS — Z87.19 HISTORY OF SMALL BOWEL OBSTRUCTION: ICD-10-CM

## 2024-10-01 DIAGNOSIS — J06.9 UPPER RESPIRATORY TRACT INFECTION, UNSPECIFIED TYPE: ICD-10-CM

## 2024-10-01 LAB
EXPIRATION DATE: NORMAL
FLUAV AG UPPER RESP QL IA.RAPID: NOT DETECTED
FLUBV AG UPPER RESP QL IA.RAPID: NOT DETECTED
INTERNAL CONTROL: NORMAL
Lab: NORMAL
SARS-COV-2 AG UPPER RESP QL IA.RAPID: NOT DETECTED

## 2024-10-01 PROCEDURE — 1160F RVW MEDS BY RX/DR IN RCRD: CPT | Performed by: NURSE PRACTITIONER

## 2024-10-01 PROCEDURE — 99214 OFFICE O/P EST MOD 30 MIN: CPT | Performed by: NURSE PRACTITIONER

## 2024-10-01 PROCEDURE — 1159F MED LIST DOCD IN RCRD: CPT | Performed by: NURSE PRACTITIONER

## 2024-10-01 PROCEDURE — 3075F SYST BP GE 130 - 139MM HG: CPT | Performed by: NURSE PRACTITIONER

## 2024-10-01 PROCEDURE — 1126F AMNT PAIN NOTED NONE PRSNT: CPT | Performed by: NURSE PRACTITIONER

## 2024-10-01 PROCEDURE — 3078F DIAST BP <80 MM HG: CPT | Performed by: NURSE PRACTITIONER

## 2024-10-01 PROCEDURE — 87428 SARSCOV & INF VIR A&B AG IA: CPT | Performed by: NURSE PRACTITIONER

## 2024-10-01 RX ORDER — PREDNISONE 20 MG/1
20 TABLET ORAL 2 TIMES DAILY
Qty: 10 TABLET | Refills: 0 | Status: SHIPPED | OUTPATIENT
Start: 2024-10-01 | End: 2024-10-06

## 2024-10-01 RX ORDER — AMOXICILLIN 875 MG
875 TABLET ORAL 2 TIMES DAILY
Qty: 20 TABLET | Refills: 0 | Status: SHIPPED | OUTPATIENT
Start: 2024-10-01 | End: 2024-10-11

## 2024-10-01 RX ORDER — FLUTICASONE PROPIONATE 50 UG/1
2 SPRAY, METERED NASAL DAILY
Qty: 16 G | Refills: 0 | Status: SHIPPED | OUTPATIENT
Start: 2024-10-01

## 2024-10-01 RX ORDER — BROMPHENIRAMINE MALEATE, PSEUDOEPHEDRINE HYDROCHLORIDE, AND DEXTROMETHORPHAN HYDROBROMIDE 2; 30; 10 MG/5ML; MG/5ML; MG/5ML
5 SYRUP ORAL 4 TIMES DAILY PRN
Qty: 240 ML | Refills: 0 | Status: SHIPPED | OUTPATIENT
Start: 2024-10-01

## 2024-10-01 NOTE — PATIENT INSTRUCTIONS
Crohn's and colitis foundation website has helpful information  Please f/u with PCP regarding headache and sinus complaints

## 2024-10-01 NOTE — PROGRESS NOTES
Chief Complaint  Cough and Earache    Subjective          Dion Espana presents to [facility]  History of Present Illness  He was sent here by his GI provider Lucy Devi.  She told him that he needed to come in and be seen.  He has been having sinus pressure and just tightness for the last month.  He has been taking Aleve and Tylenol with minimal relief.  He is got a stiffness or tightness in his neck ear pressure both sides coughing up a lot of mucus and blowing out mucus.  His granddaughter was sick with this about a week ago and she was around him.  He is getting ready to start infusions for his Crohn's.  He is pushing fluids.  He just feels like he is muffled in his ears 2.  No fevers that he is aware of.                   Allergies  Patient has no known allergies.    Social History     Tobacco Use    Smoking status: Never    Smokeless tobacco: Never   Vaping Use    Vaping status: Never Used   Substance Use Topics    Alcohol use: Not Currently    Drug use: Never       Family History   Problem Relation Age of Onset    Arthritis Mother     Stroke Mother     Heart disease Mother     Diabetes Mother     Heart disease Father     Stroke Brother     Colon cancer Neg Hx         Health Maintenance Due   Topic Date Due    TDAP/TD VACCINES (1 - Tdap) Never done    INFLUENZA VACCINE  08/01/2024        Immunization History   Administered Date(s) Administered    COVID-19 (PFIZER) BIVALENT 12+YRS 01/12/2023    COVID-19 (PFIZER) Purple Cap Monovalent 03/15/2021, 04/05/2021, 11/29/2021    COVID-19 (UNSPECIFIED) 01/08/2024    Covid-19 (Pfizer) Gray Cap Monovalent 08/24/2022    Fluzone (or Fluarix & Flulaval for VFC) >6mos 10/30/2019    Fluzone High-Dose 65+yrs 10/13/2022, 11/21/2023    Fluzone Quad >6mos (Multi-dose) 10/30/2019, 10/15/2020    Hepatitis A 07/31/2018, 03/14/2019    Pneumococcal Conjugate 13-Valent (PCV13) 01/25/2022    Pneumococcal Conjugate 20-Valent (PCV20)  02/02/2023    RSV, unspecified (Vaccine or MAB) 01/08/2024    Shingrix 02/26/2018, 08/10/2018       Review of Systems   Constitutional:  Negative for chills and fever.   HENT:  Positive for congestion, ear pain, postnasal drip, rhinorrhea, sinus pressure and sore throat.    Respiratory:  Positive for cough. Negative for shortness of breath.    Gastrointestinal:  Negative for diarrhea, nausea and vomiting.   Skin:  Negative for rash.        Objective       Vitals:    10/01/24 1435 10/01/24 1445   BP: 148/80 134/70   BP Location: Right arm Left arm   Patient Position: Sitting Sitting   Cuff Size: Adult Adult   Pulse: 71    Temp: 98 °F (36.7 °C)    SpO2: 95%        There is no height or weight on file to calculate BMI.         Physical Exam  Vitals reviewed.   Constitutional:       Appearance: Normal appearance. He is well-developed.   HENT:      Right Ear: Tympanic membrane and ear canal normal. There is no impacted cerumen.      Left Ear: Tympanic membrane and ear canal normal. There is no impacted cerumen.      Nose: Congestion and rhinorrhea present.      Mouth/Throat:      Pharynx: Posterior oropharyngeal erythema present. No oropharyngeal exudate.   Eyes:      General: No scleral icterus.        Right eye: No discharge.         Left eye: No discharge.      Conjunctiva/sclera: Conjunctivae normal.   Cardiovascular:      Rate and Rhythm: Normal rate and regular rhythm.      Heart sounds: Normal heart sounds. No murmur heard.  Pulmonary:      Effort: Pulmonary effort is normal.      Breath sounds: Normal breath sounds.   Neurological:      Mental Status: He is alert and oriented to person, place, and time.      Cranial Nerves: No cranial nerve deficit.      Motor: No weakness.   Psychiatric:         Mood and Affect: Mood and affect normal.               Result Review :     The following data was reviewed by: NICOLAS Davies on 10/01/2024:    POC COVID/FLU   Lab Results   Component Value Date     SARSANTIGEN Not Detected 10/01/2024    FLUAAG Not Detected 10/01/2024    FLUBAG Not Detected 10/01/2024    INTCT Passed 10/01/2024    LOTNVIDYA 4,190,377 10/01/2024    EXPIRATDTE 10,182,025 10/01/2024                         Assessment and Plan      Assessment & Plan  Upper respiratory tract infection, unspecified type  Discussed that he has more of a upper respiratory viral illness but with his comorbidity of Crohn's and his weakened immune system I would like to go ahead and treat with antibiotics and steroids.  He is aware to take the cough medicine as needed.  Push fluids.  Call with questions or concerns.  Acute cough  I did reach out to Lucy Devi his GI provider and she is good with him to get his flu shot once he finishes antibiotics.    Orders Placed This Encounter   Procedures    POCT SARS-CoV-2 Antigen MARGIE + Flu     New Medications Ordered This Visit   Medications    predniSONE (DELTASONE) 20 MG tablet     Sig: Take 1 tablet by mouth 2 (Two) Times a Day for 5 days.     Dispense:  10 tablet     Refill:  0    amoxicillin (AMOXIL) 875 MG tablet     Sig: Take 1 tablet by mouth 2 (Two) Times a Day for 10 days.     Dispense:  20 tablet     Refill:  0    brompheniramine-pseudoephedrine-DM 30-2-10 MG/5ML syrup     Sig: Take 5 mL by mouth 4 (Four) Times a Day As Needed for Congestion or Cough.     Dispense:  240 mL     Refill:  0    fluticasone (FLONASE) 50 MCG/ACT nasal spray     Sig: Administer 2 sprays into the nostril(s) as directed by provider Daily.     Dispense:  16 g     Refill:  0          Diagnosis Plan   1. Acute cough  POCT SARS-CoV-2 Antigen MARGIE + Flu      2. Upper respiratory tract infection, unspecified type  predniSONE (DELTASONE) 20 MG tablet    amoxicillin (AMOXIL) 875 MG tablet    brompheniramine-pseudoephedrine-DM 30-2-10 MG/5ML syrup    fluticasone (FLONASE) 50 MCG/ACT nasal spray                Follow Up     Return if symptoms worsen or fail to improve.    Patient was given  instructions and counseling regarding his condition or for health maintenance advice. Please see specific information pulled into the AVS if appropriate.     Parts of this note are electronic transcriptions/translations of spoken language to printed text using the Dragon Dictation system.          Kenia Mclean, APRN  10/01/2024

## 2024-10-01 NOTE — PROGRESS NOTES
Patient Name: Dion Espana   Visit Date: 10/01/2024   Patient ID: [id number]  Provider: NICOLAS Muhammad    Sex: male  Location:  Location Address:  Location Phone: [address]  [place] [address] [phone]    YOB: 1955  Age: 69 y.o.   Primary Care Provider Kenia Mclean APRN      Referring Provider: No ref. provider found        Chief Complaint  Abnormal CT scan Gastrointestinal tract and Vomiting (5 episodes on 9.27.24)    History of Present Illness  Initial visit April 22-referred for fatty liver, liver workup was negative 4/5/2022.     History of colonoscopy in 2021 by Dr. Barth-adenomatous polyp removed from the cecum, grade 1 internal hemorrhoids, erythema noted around IC valve, biopsy showed some active inflammation, Patient asymptomatic at the time     Patient presented to ER 5/2/2024 with abdominal pain bloating and 1 episode of vomiting, diagnosed with small bowel obstruction on CT, patient was given IV hydrocortisone and discharged on prednisone taper, elevated CRP and normal sed rate noted in the hospital, Fecal calprotectin markedly elevated so 4230  Hpylori stool Ag + (done by PCP before admission) pt had not been treated yet      CT enterography 5/2/2024: Stomach moderately distended, submucosal fat with wall thickening of the terminal ileum appears similar to previous exam with several mildly dilated more proximal small bowel loops-suggest terminal ileitis with more proximal partial small bowel obstruction, also noted is a sclerotic lesion at T11, patient had a follow-up bone scan primary care that was normal    EGD 6/10/2024: Hiatal hernia, normal esophagus, normal stomach-biopsy with mild chronic inactive gastritis otherwise negative, normal duodenum. Bx negative for hpylori   Colonoscopy 6/10/2024: Good prep, nonbleeding erosions at the ileocecal valve, benign-appearing intrinsic mild stenosis at the ileocecal valve was not traversed, biopsy  taken-chronic ileocolitis with moderate to severe activity and ulcer, no granulomas, terminal ileum appeared normal, diverticula found in the sigmoid     Patient was last seen 6/19/2024 he reported finishing all antibiotics for H. pylori, we discussed treatment options for Crohn's Skyrizi was initiated - pt has received all induction doses, started 7/18/24  Pt states he had one day of vomiting 9/27, feels it was viral as he had some body aches with it, denies abd pain, did not feel similar to when he had SBO in May .   Pt is doing well now, has daily BM , formed, no n/v. Eating regularly. No GI c/o today  Pt states he's had a mild HA with sinus pressure complaints on/off for 1 month, no visual changes.     Past Medical History:   Diagnosis Date    Arthritis     Bone lesion 05/21/2020    Crohn's disease of both small and large intestine 06/25/2024    Diverticulitis     Elevated cholesterol     Essential hypertension 10/15/2020    Gout 10/15/2020    Headache     Hearing loss     Hemorrhoids     Hyperlipidemia 10/15/2020    Lumbago 07/13/2017    LOW BACK PAIN    Lumbar disc herniation 07/13/2017    LEFT; L3-4    Lumbar spinal stenosis 05/21/2020    Paresthesia 05/21/2020    Prostate cancer 05/21/2020    Prostate cancer     Radiculopathy, lumbosacral region 05/21/2020    Renal insufficiency 10/15/2020    Sciatica 07/13/2017    Small bowel obstruction 2024    Tinnitus        Past Surgical History:   Procedure Laterality Date    BACK SURGERY      X2    CARPAL TUNNEL RELEASE      CHOLECYSTECTOMY      COLONOSCOPY  2006    DR HADLEY    COLONOSCOPY N/A 6/10/2024    Procedure: COLONOSCOPY WITH BIOPSIES;  Surgeon: Kevin Barth MD;  Location: [facility];  Service: Gastroenterology;  Laterality: N/A;  COLON ERROSIONS, DIVERTICULOSIS    ENDOSCOPY N/A 6/10/2024    Procedure: ESOPHAGOGASTRODUODENOSCOPY WITH BIOPSIES;  Surgeon: Kevin Barth MD;  Location: [facility] END[facility];  Service: Gastroenterology;   "Laterality: N/A;  HIATAL HERNIA    HAND SURGERY      THUMB SURGERY    KNEE ARTHROSCOPY Bilateral     LUMBAR DISCECTOMY  07/19/2017    L3-4; DR UGARTE; MINIMALLY INVASIVE    OTHER SURGICAL HISTORY      FISTULA    PROSTATE BIOPSY      PROSTATECTOMY      VASECTOMY         No Known Allergies    Family History   Problem Relation Age of Onset    Arthritis Mother     Stroke Mother     Heart disease Mother     Diabetes Mother     Heart disease Father     Stroke Brother     Colon cancer Neg Hx         Social History     Tobacco Use    Smoking status: Never    Smokeless tobacco: Never   Vaping Use    Vaping status: Never Used   Substance Use Topics    Alcohol use: Not Currently    Drug use: Never       Objective     Vital Signs:   /63 (BP Location: Right arm, Patient Position: Sitting, Cuff Size: Adult)   Pulse 64   Ht 177.8 cm (70\")   Wt 97.6 kg (215 lb 3.2 oz)   BMI 30.88 kg/m²       Physical Exam  Constitutional:       General: The patient is not in acute distress.     Appearance: Normal appearance.   HENT:      Head: Normocephalic and atraumatic.      Nose: Nose normal.   Pulmonary:      Effort: Pulmonary effort is normal. No respiratory distress.   Abdominal:      General: Abdomen is flat.      Palpations: Abdomen is soft. There is no mass.      Tenderness: There is no abdominal tenderness. There is no guarding.   Musculoskeletal:      Cervical back: Neck supple.      Right lower leg: No edema.      Left lower leg: No edema.   Skin:     General: Skin is warm and dry.   Neurological:      General: No focal deficit present.      Mental Status: The patient is alert and oriented to person, place, and time.      Gait: Gait normal.   Psychiatric:         Mood and Affect: Mood normal.         Speech: Speech normal.         Behavior: Behavior normal.         Thought Content: Thought content normal.     Result Review :   The following data was reviewed by: NICOLAS Muhammad on 10/01/2024:    CBC w/diff  "         5/5/2024    03:55 5/14/2024    09:04 7/12/2024    07:59   CBC w/Diff   WBC 7.20  9.39  6.27    RBC 3.89  4.77  4.55    Hemoglobin 11.6  14.0  13.5    Hematocrit 34.1  42.8  40.8    MCV 87.7  89.7  89.7    MCH 29.8  29.4  29.7    MCHC 34.0  32.7  33.1    RDW 12.9  13.2  13.8    Platelets 224  340  257    Neutrophil Rel %  47.0     Immature Granulocyte Rel %  1.6     Lymphocyte Rel %  36.2     Monocyte Rel %  13.1     Eosinophil Rel %  1.6     Basophil Rel %  0.5       CMP          7/12/2024    07:59 8/15/2024    09:04 9/12/2024    08:52   CMP   Glucose 92  134  98    BUN 13  14  14    Creatinine 0.84  1.01  1.02    EGFR 95.0  81.0  80.1    Sodium 140  140  141    Potassium 3.9  3.8  4.2    Chloride 105  102  105    Calcium 9.0  8.9  9.0    Total Protein 6.9  7.1  7.2    Albumin 4.1  4.1  4.0    Globulin 2.8  3.0  3.2    Total Bilirubin 0.8  0.8  0.8    Alkaline Phosphatase 101  99  107    AST (SGOT) 20  20  20    ALT (SGPT) 19  18  20    Albumin/Globulin Ratio 1.5  1.4  1.3    BUN/Creatinine Ratio 15.5  13.9  13.7    Anion Gap 10.4  11.6  10.0                    Assessment and Plan    Diagnoses and all orders for this visit:    1. Crohn's disease of both small and large intestine with intestinal obstruction (Primary)  -     CBC (No Diff); Future  -     Comprehensive Metabolic Panel; Future  -     CBC Auto Differential; Future  -     Comprehensive Metabolic Panel; Future  -     Iron Profile; Future  -     C-reactive Protein; Future  -     Sedimentation Rate; Future    2. History of small bowel obstruction  -     CBC (No Diff); Future  -     Comprehensive Metabolic Panel; Future              Follow Up   Return in about 6 months (around 4/1/2025).  Labs in October and in Jan   Advised pt to f/u with PCP regarding sinus complaints. Pt does not feel r/t Skyrizi reporting hx of allergy and sinus issues.   Pt understands to start Skyrizi injections 4 weeks after last infusion and then [id number]. Call if any new  symptoms or if has any further episode of vomiting.   Will have nurse navigator information given to pt at checkout.     Patient was given instructions and counseling regarding his condition or for health maintenance advice. Please see specific information pulled into the AVS if appropriate.

## 2024-10-30 ENCOUNTER — TELEPHONE (OUTPATIENT)
Dept: FAMILY MEDICINE CLINIC | Facility: CLINIC | Age: 69
End: 2024-10-30
Payer: MEDICARE

## 2024-10-30 ENCOUNTER — LAB (OUTPATIENT)
Dept: LAB | Facility: HOSPITAL | Age: 69
End: 2024-10-30
Payer: MEDICARE

## 2024-10-30 DIAGNOSIS — K50.812 CROHN'S DISEASE OF BOTH SMALL AND LARGE INTESTINE WITH INTESTINAL OBSTRUCTION: ICD-10-CM

## 2024-10-30 DIAGNOSIS — Z87.19 HISTORY OF SMALL BOWEL OBSTRUCTION: ICD-10-CM

## 2024-10-30 DIAGNOSIS — C61 PROSTATE CANCER: ICD-10-CM

## 2024-10-30 LAB
ALBUMIN SERPL-MCNC: 3.9 G/DL (ref 3.5–5.2)
ALBUMIN/GLOB SERPL: 1.3 G/DL
ALP SERPL-CCNC: 92 U/L (ref 39–117)
ALT SERPL W P-5'-P-CCNC: 17 U/L (ref 1–41)
ANION GAP SERPL CALCULATED.3IONS-SCNC: 9.7 MMOL/L (ref 5–15)
AST SERPL-CCNC: 18 U/L (ref 1–40)
BILIRUB SERPL-MCNC: 0.7 MG/DL (ref 0–1.2)
BUN SERPL-MCNC: 13 MG/DL (ref 8–23)
BUN/CREAT SERPL: 14.3 (ref 7–25)
CALCIUM SPEC-SCNC: 8.8 MG/DL (ref 8.6–10.5)
CHLORIDE SERPL-SCNC: 105 MMOL/L (ref 98–107)
CO2 SERPL-SCNC: 25.3 MMOL/L (ref 22–29)
CREAT SERPL-MCNC: 0.91 MG/DL (ref 0.76–1.27)
DEPRECATED RDW RBC AUTO: 42.5 FL (ref 37–54)
EGFRCR SERPLBLD CKD-EPI 2021: 91.8 ML/MIN/1.73
ERYTHROCYTE [DISTWIDTH] IN BLOOD BY AUTOMATED COUNT: 13.1 % (ref 12.3–15.4)
GLOBULIN UR ELPH-MCNC: 3 GM/DL
GLUCOSE SERPL-MCNC: 92 MG/DL (ref 65–99)
HCT VFR BLD AUTO: 40.5 % (ref 37.5–51)
HGB BLD-MCNC: 13.5 G/DL (ref 13–17.7)
MCH RBC QN AUTO: 30.1 PG (ref 26.6–33)
MCHC RBC AUTO-ENTMCNC: 33.3 G/DL (ref 31.5–35.7)
MCV RBC AUTO: 90.2 FL (ref 79–97)
PLATELET # BLD AUTO: 252 10*3/MM3 (ref 140–450)
PMV BLD AUTO: 9.4 FL (ref 6–12)
POTASSIUM SERPL-SCNC: 3.9 MMOL/L (ref 3.5–5.2)
PROT SERPL-MCNC: 6.9 G/DL (ref 6–8.5)
PSA SERPL-MCNC: 0.31 NG/ML (ref 0–4)
RBC # BLD AUTO: 4.49 10*6/MM3 (ref 4.14–5.8)
SODIUM SERPL-SCNC: 140 MMOL/L (ref 136–145)
WBC NRBC COR # BLD AUTO: 5.54 10*3/MM3 (ref 3.4–10.8)

## 2024-10-30 PROCEDURE — 84153 ASSAY OF PSA TOTAL: CPT

## 2024-10-30 PROCEDURE — 80053 COMPREHEN METABOLIC PANEL: CPT

## 2024-10-30 PROCEDURE — 85027 COMPLETE CBC AUTOMATED: CPT

## 2024-10-30 PROCEDURE — 36415 COLL VENOUS BLD VENIPUNCTURE: CPT

## 2024-11-04 ENCOUNTER — OFFICE VISIT (OUTPATIENT)
Dept: UROLOGY | Age: 69
End: 2024-11-04
Payer: MEDICARE

## 2024-11-04 VITALS
WEIGHT: 215 LBS | SYSTOLIC BLOOD PRESSURE: 174 MMHG | HEIGHT: 70 IN | BODY MASS INDEX: 30.78 KG/M2 | DIASTOLIC BLOOD PRESSURE: 97 MMHG

## 2024-11-04 DIAGNOSIS — C61 PROSTATE CANCER: Primary | ICD-10-CM

## 2024-11-04 LAB
BILIRUB BLD-MCNC: NEGATIVE MG/DL
CLARITY, POC: CLEAR
COLOR UR: YELLOW
EXPIRATION DATE: NORMAL
GLUCOSE UR STRIP-MCNC: NEGATIVE MG/DL
KETONES UR QL: NEGATIVE
LEUKOCYTE EST, POC: NEGATIVE
Lab: NORMAL
NITRITE UR-MCNC: NEGATIVE MG/ML
PH UR: 5.5 [PH] (ref 5–8)
PROT UR STRIP-MCNC: NEGATIVE MG/DL
RBC # UR STRIP: NEGATIVE /UL
SP GR UR: 1.03 (ref 1–1.03)
UROBILINOGEN UR QL: NORMAL

## 2024-11-04 PROCEDURE — 3077F SYST BP >= 140 MM HG: CPT | Performed by: UROLOGY

## 2024-11-04 PROCEDURE — 81003 URINALYSIS AUTO W/O SCOPE: CPT | Performed by: UROLOGY

## 2024-11-04 PROCEDURE — 3080F DIAST BP >= 90 MM HG: CPT | Performed by: UROLOGY

## 2024-11-04 PROCEDURE — 1160F RVW MEDS BY RX/DR IN RCRD: CPT | Performed by: UROLOGY

## 2024-11-04 PROCEDURE — 1159F MED LIST DOCD IN RCRD: CPT | Performed by: UROLOGY

## 2024-11-04 PROCEDURE — 99213 OFFICE O/P EST LOW 20 MIN: CPT | Performed by: UROLOGY

## 2024-11-04 NOTE — PROGRESS NOTES
"Chief Complaint  Prostate Cancer    Subjective          Dion Espana presents to [facility]    History of Present Illness  The patient presents today for a follow-up.     The patient is doing well. His PSA is slowly rising. He reports that he has under gone a prostatectomy in 2016.         History of Present Illness  69 yo male s/p RALP with bilateral PLND for intermediate risk prostate cancer.   Surgery in Sept 2016  PSA 4.7  Biopsy - 3+4 L mid and 3+3 R base  cT1c     Final Path - Surgery in Sept.   [place] 3+4 (Marely Group 2)  [id number]  margins were negative     His PSA is slowly rising.   1 year ago it was 0.165 and then was 0.180 6 months ago.  In October 2023 it was 0.170.  In April 2024 it was 0.240 and 0.281 in July 2024.  It is now 0.31 in Oct 2024.      He has no other complaints.      Objective   Vital Signs:   /97   Ht 177.8 cm (70\")   Wt 97.5 kg (215 lb)   BMI 30.85 kg/m²       Physical Exam  Vitals and nursing note reviewed.   Constitutional:       Appearance: Normal appearance. He is well-developed.   Pulmonary:      Effort: Pulmonary effort is normal.      Breath sounds: Normal air entry.   Neurological:      Mental Status: He is alert and oriented to person, place, and time.      Motor: Motor function is intact.   Psychiatric:         Mood and Affect: Mood normal.         Behavior: Behavior normal.          Result Review :   The following data was reviewed by: Silvana Alonso MD on 11/04/2024:    Results for orders placed or performed in visit on 11/04/24   POC Urinalysis Dipstick, Automated    Collection Time: 11/04/24 10:14 AM    Specimen: Urine   Result Value Ref Range    Color Yellow Yellow, Straw, Dark Yellow, Marce    Clarity, UA Clear Clear    Specific Gravity  1.030 1.005 - 1.030    pH, Urine 5.5 5.0 - 8.0    Leukocytes Negative Negative    Nitrite, UA Negative Negative    Protein, POC Negative Negative mg/dL    Glucose, UA Negative Negative " mg/dL    Ketones, UA Negative Negative    Urobilinogen, UA 0.2 E.U./dL Normal, 0.2 E.U./dL    Bilirubin Negative Negative    Blood, UA Negative Negative    Lot Number [id number]     Expiration Date 82,025        PSA          5/2/2024    10:54 7/29/2024    08:03 10/30/2024    09:25   PSA   PSA 0.257  0.281  0.313               Assessment and Plan    Diagnoses and all orders for this visit:    1. Prostate cancer (Primary)  -     POC Urinalysis Dipstick, Automated  -     NM PET/CT Skull Base to Mid Thigh; Future    I will go ahead and get a PMSA a scan to evaluate for recurrent prostate cancer given his rising PSA.  I will call him with those results.  He will likely need a referral to medical or radiation oncology depending on the results.        Follow Up       No follow-ups on file.  Patient was given instructions and counseling regarding his condition or for health maintenance advice. Please see specific information pulled into the AVS if appropriate.

## 2024-11-06 ENCOUNTER — OFFICE VISIT (OUTPATIENT)
Dept: FAMILY MEDICINE CLINIC | Facility: CLINIC | Age: 69
End: 2024-11-06
Payer: MEDICARE

## 2024-11-06 VITALS
HEART RATE: 73 BPM | SYSTOLIC BLOOD PRESSURE: 138 MMHG | BODY MASS INDEX: 30.85 KG/M2 | DIASTOLIC BLOOD PRESSURE: 73 MMHG | OXYGEN SATURATION: 96 % | HEIGHT: 70 IN | TEMPERATURE: 97.2 F

## 2024-11-06 DIAGNOSIS — M26.609 TMJ (TEMPOROMANDIBULAR JOINT SYNDROME): Primary | ICD-10-CM

## 2024-11-06 DIAGNOSIS — I10 BENIGN ESSENTIAL HYPERTENSION: ICD-10-CM

## 2024-11-06 DIAGNOSIS — H69.93 DYSFUNCTION OF BOTH EUSTACHIAN TUBES: ICD-10-CM

## 2024-11-06 RX ORDER — TIZANIDINE 2 MG/1
2 TABLET ORAL NIGHTLY PRN
Qty: 20 TABLET | Refills: 0 | Status: SHIPPED | OUTPATIENT
Start: 2024-11-06

## 2024-11-06 RX ORDER — METHYLPREDNISOLONE ACETATE 40 MG/ML
40 INJECTION, SUSPENSION INTRA-ARTICULAR; INTRALESIONAL; INTRAMUSCULAR; SOFT TISSUE ONCE
Status: COMPLETED | OUTPATIENT
Start: 2024-11-06 | End: 2024-11-06

## 2024-11-06 RX ADMIN — METHYLPREDNISOLONE ACETATE 40 MG: 40 INJECTION, SUSPENSION INTRA-ARTICULAR; INTRALESIONAL; INTRAMUSCULAR; SOFT TISSUE at 10:24

## 2024-11-06 NOTE — PROGRESS NOTES
Chief Complaint  sinus congestion, ear pain , and Cough    Subjective          Dion Espana presents to [facility]  Cough      He is here with his wife today.  He was seen a little over a month ago in the office and was given antibiotics and steroids he said that he got a little bit better but he did not get all the way better.  He said that he has got sinus congestion and his left ear is hurting.  His wife did say that she has been trying to massage the area but he said that it hurts inside the ear.  He has been tested in the past for allergies and he did not take his loratadine while he was taking antibiotics.  He is taking [place] cold and flu which has helped some but not all the way.  He feels like he is got more of a cotton in his ear.  He does have some tinnitus but that is a past history to in the process.  No fever noted.  He has had a cough but not a lot of mucus up  He has noticed that his blood pressure also has been going up at home.  He has been a little stressed because urology is wanting him to go to [place] for a MRI/PET scan.  Denies any chest pain or shortness of breath.                   Allergies  Patient has no known allergies.    Social History     Tobacco Use    Smoking status: Never    Smokeless tobacco: Never   Vaping Use    Vaping status: Never Used   Substance Use Topics    Alcohol use: Not Currently    Drug use: Never       Family History   Problem Relation Age of Onset    Arthritis Mother     Stroke Mother     Heart disease Mother     Diabetes Mother     Heart disease Father     Stroke Brother     Colon cancer Neg Hx         Health Maintenance Due   Topic Date Due    TDAP/TD VACCINES (1 - Tdap) Never done    INFLUENZA VACCINE  08/01/2024    ANNUAL WELLNESS VISIT  01/29/2025        Immunization History   Administered Date(s) Administered    COVID-19 (PFIZER) BIVALENT 12+YRS 01/12/2023    COVID-19 (PFIZER) Purple Cap Monovalent 03/15/2021,  "04/05/2021, 11/29/2021    COVID-19 (UNSPECIFIED) 01/08/2024    Covid-19 (Pfizer) Gray Cap Monovalent 08/24/2022    Fluzone (or Fluarix & Flulaval for VFC) >6mos 10/30/2019    Fluzone High-Dose 65+yrs 10/13/2022, 11/21/2023    Fluzone Quad >6mos (Multi-dose) 10/30/2019, 10/15/2020    Hepatitis A 07/31/2018, 03/14/2019    Pneumococcal Conjugate 13-Valent (PCV13) 01/25/2022    Pneumococcal Conjugate 20-Valent (PCV20) 02/02/2023    RSV, unspecified (Vaccine or MAB) 01/08/2024    Shingrix 02/26/2018, 08/10/2018       Review of Systems   Respiratory:  Positive for cough.         Objective       Vitals:    11/06/24 0935   BP: 138/73   BP Location: Left arm   Patient Position: Sitting   Cuff Size: Adult   Pulse: 73   Temp: 97.2 °F (36.2 °C)   SpO2: 96%   Height: 177.8 cm (70\")       Body mass index is 30.85 kg/m².         Physical Exam  Vitals reviewed.   Constitutional:       Appearance: Normal appearance. He is well-developed.   HENT:      Right Ear: Tympanic membrane and ear canal normal.      Left Ear: Tympanic membrane and ear canal normal.      Ears:      Comments: I discussed that his eardrums look good.  He does have a little mild swelling noted on the left TMJ area compared to the right.  I did show his wife the comparison on exam.  He also had the tenderness just below the ear and at the TMJ.     Nose: Rhinorrhea present. No congestion.      Mouth/Throat:      Pharynx: Posterior oropharyngeal erythema present. No oropharyngeal exudate.   Cardiovascular:      Rate and Rhythm: Normal rate and regular rhythm.      Heart sounds: Normal heart sounds. No murmur heard.  Pulmonary:      Effort: Pulmonary effort is normal.      Breath sounds: Normal breath sounds. No wheezing or rhonchi.   Neurological:      Mental Status: He is alert and oriented to person, place, and time.      Cranial Nerves: No cranial nerve deficit.      Motor: No weakness.   Psychiatric:         Mood and Affect: Mood and affect normal. "               Result Review :     The following data was reviewed by: NICOLAS Davies on 11/06/2024:            No Images in the past 120 days found..              Assessment and Plan      Assessment & Plan  TMJ (temporomandibular joint syndrome)  I discussed that he does not need an antibiotic currently.  He needs to do the steroid which he requested an injection which is fine.  We will do the muscle relaxer at night caution sedation.  We did discuss about exercises for the TMJ and they will try that.  Orders:    methylPREDNISolone acetate (DEPO-medrol) injection 40 mg    tiZANidine (ZANAFLEX) 2 MG tablet; Take 1 tablet by mouth At Night As Needed for Muscle Spasms.    Dysfunction of both eustachian tubes  Continue on the Claritin and the nasal spray.  He also has a Singulair.  If not better we may need to look into potentially allergy shots or allergy testing again.       Benign essential hypertension  Keep me posted on the blood pressure in the next couple weeks.            Diagnosis Plan   1. TMJ (temporomandibular joint syndrome)  methylPREDNISolone acetate (DEPO-medrol) injection 40 mg    tiZANidine (ZANAFLEX) 2 MG tablet      2. Dysfunction of both eustachian tubes        3. Benign essential hypertension                  Follow Up     Return if symptoms worsen or fail to improve.    Patient was given instructions and counseling regarding his condition or for health maintenance advice. Please see specific information pulled into the AVS if appropriate.     Parts of this note are electronic transcriptions/translations of spoken language to printed text using the Dragon Dictation system.          NICOLAS Davies  11/06/2024

## 2024-11-12 ENCOUNTER — TELEPHONE (OUTPATIENT)
Dept: UROLOGY | Age: 69
End: 2024-11-12
Payer: MEDICARE

## 2024-11-12 DIAGNOSIS — C61 PROSTATE CANCER: Primary | ICD-10-CM

## 2024-11-12 DIAGNOSIS — R97.21 RISING PSA FOLLOWING TREATMENT FOR MALIGNANT NEOPLASM OF PROSTATE: ICD-10-CM

## 2024-11-12 DIAGNOSIS — R97.21 INCREASING PROSTATE SPECIFIC ANTIGEN (PSA) LEVEL AFTER TREATMENT FOR MALIGNANT NEOPLASM OF PROSTATE: ICD-10-CM

## 2024-11-12 DIAGNOSIS — C61 BIOCHEMICALLY RECURRENT MALIGNANT NEOPLASM OF PROSTATE: ICD-10-CM

## 2024-11-12 DIAGNOSIS — R97.21 BIOCHEMICALLY RECURRENT MALIGNANT NEOPLASM OF PROSTATE: ICD-10-CM

## 2024-11-12 NOTE — TELEPHONE ENCOUNTER
[facility] WITH [employer] PET SCAN COMPLI[facility] NEEDS AN ADDITIONAL CODE ADDED TO THE ORDER SINCE IT IS A RESTAGING. PLEASE ADD THE CODE R97.21 IN ADDITION TO THE CODE C61 THAT IS ON THERE.

## 2024-11-26 ENCOUNTER — HOSPITAL ENCOUNTER (OUTPATIENT)
Dept: PET IMAGING | Facility: HOSPITAL | Age: 69
Discharge: HOME | End: 2024-11-26
Payer: MEDICARE

## 2024-11-26 DIAGNOSIS — R97.21 BIOCHEMICALLY RECURRENT MALIGNANT NEOPLASM OF PROSTATE: ICD-10-CM

## 2024-11-26 DIAGNOSIS — R97.21 RISING PSA FOLLOWING TREATMENT FOR MALIGNANT NEOPLASM OF PROSTATE: ICD-10-CM

## 2024-11-26 DIAGNOSIS — C61 BIOCHEMICALLY RECURRENT MALIGNANT NEOPLASM OF PROSTATE: ICD-10-CM

## 2024-11-26 DIAGNOSIS — R97.21 INCREASING PROSTATE SPECIFIC ANTIGEN (PSA) LEVEL AFTER TREATMENT FOR MALIGNANT NEOPLASM OF PROSTATE: ICD-10-CM

## 2024-11-26 PROCEDURE — 34310000005 PIFLUFOLASTAT F 18 9 MCI SOLUTION PREFILLED SYRINGE: Performed by: UROLOGY

## 2024-11-26 PROCEDURE — 78815 PET IMAGE W/CT SKULL-THIGH: CPT

## 2024-11-26 PROCEDURE — A9595 PIFLUFOLASTAT F 18 9 MCI SOLUTION PREFILLED SYRINGE: HCPCS | Performed by: UROLOGY

## 2024-11-26 RX ADMIN — PIFLUFOLASTAT F-18 1 DOSE: 80 INJECTION INTRAVENOUS at 12:45

## 2024-12-03 DIAGNOSIS — C61 PROSTATE CANCER: Primary | ICD-10-CM

## 2024-12-10 ENCOUNTER — TELEPHONE (OUTPATIENT)
Dept: FAMILY MEDICINE CLINIC | Facility: CLINIC | Age: 69
End: 2024-12-10
Payer: MEDICARE

## 2024-12-10 NOTE — TELEPHONE ENCOUNTER
Patient said he has spots like moles on arms and abd that are increasing in size.  He has seen [facility] before.

## 2024-12-11 NOTE — ASSESSMENT & PLAN NOTE
- The patient is to follow-up in 6 months with a PSA prior.  - The patients PSA is slowly rising, but he has not reached the level of biochemical recurrence at this point.   - We will continue to monitor.   Telemetry Bed?: Yes   Admitting Physician: FERNANDO PIRES [[id number]]   Is this a telephone or verbal order?: This is a telephone order from the admitting physician

## 2024-12-19 ENCOUNTER — OFFICE VISIT (OUTPATIENT)
Dept: FAMILY MEDICINE CLINIC | Facility: CLINIC | Age: 69
End: 2024-12-19
Payer: MEDICARE

## 2024-12-19 VITALS
HEART RATE: 52 BPM | BODY MASS INDEX: 30.06 KG/M2 | RESPIRATION RATE: 18 BRPM | DIASTOLIC BLOOD PRESSURE: 78 MMHG | WEIGHT: 210 LBS | HEIGHT: 70 IN | TEMPERATURE: 97.5 F | SYSTOLIC BLOOD PRESSURE: 130 MMHG | OXYGEN SATURATION: 98 %

## 2024-12-19 DIAGNOSIS — I10 BENIGN ESSENTIAL HYPERTENSION: ICD-10-CM

## 2024-12-19 DIAGNOSIS — L98.9 SKIN LESION: Primary | ICD-10-CM

## 2024-12-19 PROCEDURE — G2211 COMPLEX E/M VISIT ADD ON: HCPCS | Performed by: NURSE PRACTITIONER

## 2024-12-19 PROCEDURE — 99213 OFFICE O/P EST LOW 20 MIN: CPT | Performed by: NURSE PRACTITIONER

## 2024-12-19 PROCEDURE — 3078F DIAST BP <80 MM HG: CPT | Performed by: NURSE PRACTITIONER

## 2024-12-19 PROCEDURE — 1159F MED LIST DOCD IN RCRD: CPT | Performed by: NURSE PRACTITIONER

## 2024-12-19 PROCEDURE — 1160F RVW MEDS BY RX/DR IN RCRD: CPT | Performed by: NURSE PRACTITIONER

## 2024-12-19 PROCEDURE — 1126F AMNT PAIN NOTED NONE PRSNT: CPT | Performed by: NURSE PRACTITIONER

## 2024-12-19 PROCEDURE — 3075F SYST BP GE 130 - 139MM HG: CPT | Performed by: NURSE PRACTITIONER

## 2024-12-19 NOTE — PROGRESS NOTES
Chief Complaint  Skin Problem (Needs referral to derm)    Subjective          Dion Espana presents to [facility]  History of Present Illness  HTN  he feels fine with heart rate in 50-60's is not having issues currently.    SKIN lesion: His yearly check--he goes to [facility] yearly and needs a new referral.  He has  several areas that he would like checked.               Allergies  Patient has no known allergies.    Social History     Tobacco Use    Smoking status: Never    Smokeless tobacco: Never   Vaping Use    Vaping status: Never Used   Substance Use Topics    Alcohol use: Not Currently    Drug use: Never       Family History   Problem Relation Age of Onset    Arthritis Mother     Stroke Mother     Heart disease Mother     Diabetes Mother     Heart disease Father     Stroke Brother     Colon cancer Neg Hx         Health Maintenance Due   Topic Date Due    TDAP/TD VACCINES (1 - Tdap) Never done    INFLUENZA VACCINE  07/01/2024    COVID-19 Vaccine (7 - 2024-25 season) 09/01/2024    ANNUAL WELLNESS VISIT  01/29/2025        Immunization History   Administered Date(s) Administered    COVID-19 (PFIZER) BIVALENT 12+YRS 01/12/2023    COVID-19 (PFIZER) Purple Cap Monovalent 03/15/2021, 04/05/2021, 11/29/2021    COVID-19 (UNSPECIFIED) 01/08/2024    Covid-19 (Pfizer) Gray Cap Monovalent 08/24/2022    Fluzone (or Fluarix & Flulaval for VFC) >6mos 10/30/2019    Fluzone High-Dose 65+yrs 10/13/2022, 11/21/2023    Fluzone Quad >6mos (Multi-dose) 10/30/2019, 10/15/2020    Hepatitis A 07/31/2018, 03/14/2019    Pneumococcal Conjugate 13-Valent (PCV13) 01/25/2022    Pneumococcal Conjugate 20-Valent (PCV20) 02/02/2023    RSV, unspecified (Vaccine or MAB) 01/08/2024    Shingrix 02/26/2018, 08/10/2018       Review of Systems   Skin:  Positive for skin lesions.        Objective       Vitals:    12/19/24 0911 12/19/24 0916   BP: 140/75 130/78   Pulse: 52    Resp: 18    Temp: 97.5 °F (36.4  "°C)    SpO2: 98%    Weight: 95.3 kg (210 lb)    Height: 177.8 cm (70\")        Body mass index is 30.13 kg/m².         Physical Exam  Vitals reviewed.   Constitutional:       Appearance: Normal appearance. He is well-developed.   Cardiovascular:      Rate and Rhythm: Regular rhythm. Bradycardia present.      Heart sounds: Normal heart sounds. No murmur heard.     Comments: Consistent between 50 and 60 on exam  Pulmonary:      Effort: Pulmonary effort is normal.      Breath sounds: Normal breath sounds.   Neurological:      Mental Status: He is alert and oriented to person, place, and time.      Cranial Nerves: No cranial nerve deficit.      Motor: No weakness.   Psychiatric:         Mood and Affect: Mood and affect normal.     There are several dry spots/seborrheic keratosis is noted.  There are none that have an irregular border or suspicious discoloration.          Result Review :     The following data was reviewed by: NICOLAS Davies on 12/19/2024:            NM PET/CT Skull Base to Mid Thigh    Result Date: 11/28/2024  No evidence active primary or metastatic disease. Normal Pylarify PSMA exam. Electronically Signed: Radha Anguiano MD  11/28/2024 10:13 AM EST  Workstation ID: [id number]                Assessment and Plan      Assessment & Plan  Skin lesion    Orders:    Ambulatory Referral to Dermatology    Benign essential hypertension              Diagnosis Plan   1. Skin lesion  Ambulatory Referral to Dermatology      2. Benign essential hypertension                  Follow Up     No follow-ups on file.    Patient was given instructions and counseling regarding his condition or for health maintenance advice. Please see specific information pulled into the AVS if appropriate.     Parts of this note are electronic transcriptions/translations of spoken language to printed text using the Dragon Dictation system.          NICOLAS Davies  12/19/2024  "

## 2025-01-05 NOTE — H&P
[facility]  [facility]   Patient Name: Dion Espana  : 1955  MRN: [id number]  Primary Care Physician:  Kenia Mclean APRN  Date of admission: 2024    Subjective   Subjective     Chief Complaint: Abdominal pain    HPI:    Dion Espana is a 69 y.o. male with a past medical history of gout, hypertension, history of prostate cancer status post radical prostatectomy, history of cholecystectomy    Patient presented the emergency department on 2024 after being evaluated by outpatient care provider.  Patient had been started with abdominal pain bloating indigestion and 1 episode of vomiting.  CT scan was performed as an outpatient showing fatty mural infiltration of the distal 15 cm of the terminal ileum suggesting chronic inflammatory bowel disease, abnormal dilation of the proximal small bowel loops consistent with partial obstruction.  Patient has been having intermittent abdominal pain, limited, for the past month.  However starting on 2024 patient started with worsening abdominal pain resulting in minimal p.o. intake.  Patient with diarrhea initially however this is resolved.  Patient with another bout of diarrhea the night before presenting to the hospital on 2024.  Patient had 1 episode of emesis, bilious nonbloody on 2024.  Prior to this recent episode patient denies any changes in his bowel habits, no weight loss.  Patient was sent to the emergency department and admitted for further care and management.      Personal History     Past Medical History:  Past Medical History:   Diagnosis Date    Arthritis     Bone lesion 2020    Diverticulitis     Essential hypertension 10/15/2020    Gout 10/15/2020    Headache     Hearing loss     Hemorrhoids     Hyperlipidemia 10/15/2020    Lumbago 2017    LOW BACK PAIN    Lumbar disc herniation 2017    LEFT; L3-4    Lumbar spinal stenosis 2020    Paresthesia  No care due was identified.  [facility] Embedded Care Due Messages. Reference number: [id number].   1/05/2025 12:29:21 PM CST   05/21/2020    Prostate cancer 05/21/2020    Radiculopathy, lumbosacral region 05/21/2020    Renal insufficiency 10/15/2020    Sciatica 07/13/2017    Tinnitus        Past Surgical History:  Past Surgical History:   Procedure Laterality Date    BACK SURGERY      X2    CARPAL TUNNEL RELEASE      CHOLECYSTECTOMY      COLONOSCOPY  2006    DR HADLEY    HAND SURGERY      THUMB SURGERY    KNEE ARTHROSCOPY Bilateral     LUMBAR DISCECTOMY  07/19/2017    L3-4; DR UGARTE; MINIMALLY INVASIVE    OTHER SURGICAL HISTORY      FISTULA    PROSTATE BIOPSY      PROSTATECTOMY      VASECTOMY         Family History:   Family History   Problem Relation Age of Onset    Arthritis Mother     Stroke Mother     Heart disease Mother     Diabetes Mother     Heart disease Father     Stroke Brother     Colon cancer Neg Hx        Social History:   Social History     Socioeconomic History    Marital status:    Tobacco Use    Smoking status: Never    Smokeless tobacco: Never   Vaping Use    Vaping status: Never Used   Substance and Sexual Activity    Alcohol use: Not Currently    Drug use: Never    Sexual activity: Yes     Partners: Female     Birth control/protection: Vasectomy       Home Medications:  Omega-3, allopurinol, amLODIPine-benazepril, atorvastatin, metoprolol succinate XL, montelukast, and sildenafil    Allergies:  No Known Allergies      Objective   Objective     Vitals:   Temp:  [97.6 °F (36.4 °C)-98.1 °F (36.7 °C)] 98.1 °F (36.7 °C)  Heart Rate:  [55-64] 55  Resp:  [16-18] 16  BP: (119-134)/(61-74) 124/61    Physical Exam    Constitutional: Awake, alert, no acute distress   Eyes: Pupils equal, sclerae anicteric, no conjunctival injection   HENT: NCAT, mucous membranes moist   Neck: Supple, no thyromegaly, no lymphadenopathy, trachea midline   Respiratory: Clear to auscultation bilaterally, nonlabored respirations    Cardiovascular: RRR, no murmurs, rubs, or gallops, palpable pedal pulses bilaterally   Gastrointestinal:  Positive bowel sounds, soft, distended, diffusely tender   Musculoskeletal: No bilateral ankle edema, no clubbing or cyanosis to extremities   Psychiatric: Appropriate affect, cooperative   Neurologic: Oriented x 3, strength symmetric in all extremities, Cranial Nerves grossly intact to confrontation, speech clear   Skin: No rashes     Result Review    Result Review:  I have personally reviewed the results from the time of this admission to 5/2/2024 14:52 EDT and agree with these findings:  []  Laboratory  []  Microbiology  []  Radiology  []  EKG/Telemetry   []  Cardiology/Vascular   []  Pathology  []  Old records  []  Other:      Assessment & Plan   Assessment / Plan     Assessment/Plan:   Small bowel obstruction  Fatty marrow infiltration of the distal 15 cm of the terminal ileum suggesting chronic inflammatory bowel disease  Gout  Hypertension  History of prostate cancer status post radical prostatectomy    Plan:  Patient will be admitted to hospital for further care and management  General surgery consulted in the emergency department, appreciate assistance  Per surgery will be n.p.o. except ice chips, insert NG tube  Discussed with gastroenterologist will order inflammatory markers and fecal calprotectin, given clinical history low likelihood for inflammatory bowel disease  Patient will continue with IV fluids  Symptomatic management for pain or nausea available    Discussed case with ED physician, gastroenterologist, general surgery    DVT prophylaxis:  Medical DVT prophylaxis orders are signed and held.          CODE STATUS:    Code Status (Patient has no pulse and is not breathing): CPR (Attempt to Resuscitate)  Medical Interventions (Patient has pulse or is breathing): Full Support      Admission Status:  I believe this patient meets inpatient status.    Electronically signed by Manny Quintero MD, 05/02/24, 2:52 PM EDT.

## 2025-01-30 ENCOUNTER — OFFICE VISIT (OUTPATIENT)
Dept: FAMILY MEDICINE CLINIC | Facility: CLINIC | Age: 70
End: 2025-01-30
Payer: MEDICARE

## 2025-01-30 VITALS
DIASTOLIC BLOOD PRESSURE: 78 MMHG | WEIGHT: 210.4 LBS | RESPIRATION RATE: 16 BRPM | HEART RATE: 54 BPM | OXYGEN SATURATION: 96 % | HEIGHT: 70 IN | BODY MASS INDEX: 30.12 KG/M2 | TEMPERATURE: 97.4 F | SYSTOLIC BLOOD PRESSURE: 130 MMHG

## 2025-01-30 DIAGNOSIS — T78.40XD ALLERGY, SUBSEQUENT ENCOUNTER: ICD-10-CM

## 2025-01-30 DIAGNOSIS — M10.9 GOUT, UNSPECIFIED CAUSE, UNSPECIFIED CHRONICITY, UNSPECIFIED SITE: ICD-10-CM

## 2025-01-30 DIAGNOSIS — N52.9 ERECTILE DYSFUNCTION, UNSPECIFIED ERECTILE DYSFUNCTION TYPE: ICD-10-CM

## 2025-01-30 DIAGNOSIS — E78.00 PURE HYPERCHOLESTEROLEMIA: ICD-10-CM

## 2025-01-30 DIAGNOSIS — I10 BENIGN ESSENTIAL HYPERTENSION: ICD-10-CM

## 2025-01-30 DIAGNOSIS — Z00.00 MEDICARE ANNUAL WELLNESS VISIT, SUBSEQUENT: Primary | ICD-10-CM

## 2025-01-30 LAB
ALBUMIN SERPL-MCNC: 4.1 G/DL (ref 3.5–5.2)
ALBUMIN/GLOB SERPL: 1.3 G/DL
ALP SERPL-CCNC: 104 U/L (ref 39–117)
ALT SERPL W P-5'-P-CCNC: 24 U/L (ref 1–41)
ANION GAP SERPL CALCULATED.3IONS-SCNC: 10 MMOL/L (ref 5–15)
AST SERPL-CCNC: 22 U/L (ref 1–40)
BASOPHILS # BLD AUTO: 0.06 10*3/MM3 (ref 0–0.2)
BASOPHILS NFR BLD AUTO: 1.1 % (ref 0–1.5)
BILIRUB SERPL-MCNC: 0.9 MG/DL (ref 0–1.2)
BUN SERPL-MCNC: 15 MG/DL (ref 8–23)
BUN/CREAT SERPL: 13.5 (ref 7–25)
CALCIUM SPEC-SCNC: 9.1 MG/DL (ref 8.6–10.5)
CHLORIDE SERPL-SCNC: 102 MMOL/L (ref 98–107)
CHOLEST SERPL-MCNC: 143 MG/DL (ref 0–200)
CO2 SERPL-SCNC: 27 MMOL/L (ref 22–29)
CREAT SERPL-MCNC: 1.11 MG/DL (ref 0.76–1.27)
DEPRECATED RDW RBC AUTO: 41.5 FL (ref 37–54)
EGFRCR SERPLBLD CKD-EPI 2021: 72.3 ML/MIN/1.73
EOSINOPHIL # BLD AUTO: 0.14 10*3/MM3 (ref 0–0.4)
EOSINOPHIL NFR BLD AUTO: 2.5 % (ref 0.3–6.2)
ERYTHROCYTE [DISTWIDTH] IN BLOOD BY AUTOMATED COUNT: 12.8 % (ref 12.3–15.4)
GLOBULIN UR ELPH-MCNC: 3.1 GM/DL
GLUCOSE SERPL-MCNC: 92 MG/DL (ref 65–99)
HCT VFR BLD AUTO: 42.5 % (ref 37.5–51)
HDLC SERPL-MCNC: 33 MG/DL (ref 40–60)
HGB BLD-MCNC: 14.3 G/DL (ref 13–17.7)
IMM GRANULOCYTES # BLD AUTO: 0.03 10*3/MM3 (ref 0–0.05)
IMM GRANULOCYTES NFR BLD AUTO: 0.5 % (ref 0–0.5)
LDLC SERPL CALC-MCNC: 79 MG/DL (ref 0–100)
LDLC/HDLC SERPL: 2.24 {RATIO}
LYMPHOCYTES # BLD AUTO: 1.49 10*3/MM3 (ref 0.7–3.1)
LYMPHOCYTES NFR BLD AUTO: 26.8 % (ref 19.6–45.3)
MCH RBC QN AUTO: 30.3 PG (ref 26.6–33)
MCHC RBC AUTO-ENTMCNC: 33.6 G/DL (ref 31.5–35.7)
MCV RBC AUTO: 90 FL (ref 79–97)
MONOCYTES # BLD AUTO: 0.76 10*3/MM3 (ref 0.1–0.9)
MONOCYTES NFR BLD AUTO: 13.6 % (ref 5–12)
NEUTROPHILS NFR BLD AUTO: 3.09 10*3/MM3 (ref 1.7–7)
NEUTROPHILS NFR BLD AUTO: 55.5 % (ref 42.7–76)
NRBC BLD AUTO-RTO: 0 /100 WBC (ref 0–0.2)
PLATELET # BLD AUTO: 286 10*3/MM3 (ref 140–450)
PMV BLD AUTO: 9.7 FL (ref 6–12)
POTASSIUM SERPL-SCNC: 4.1 MMOL/L (ref 3.5–5.2)
PROT SERPL-MCNC: 7.2 G/DL (ref 6–8.5)
RBC # BLD AUTO: 4.72 10*6/MM3 (ref 4.14–5.8)
SODIUM SERPL-SCNC: 139 MMOL/L (ref 136–145)
TRIGL SERPL-MCNC: 180 MG/DL (ref 0–150)
TSH SERPL DL<=0.05 MIU/L-ACNC: 2.42 UIU/ML (ref 0.27–4.2)
URATE SERPL-MCNC: 4.9 MG/DL (ref 3.4–7)
VLDLC SERPL-MCNC: 31 MG/DL (ref 5–40)
WBC NRBC COR # BLD AUTO: 5.57 10*3/MM3 (ref 3.4–10.8)

## 2025-01-30 PROCEDURE — 84443 ASSAY THYROID STIM HORMONE: CPT | Performed by: NURSE PRACTITIONER

## 2025-01-30 PROCEDURE — 1126F AMNT PAIN NOTED NONE PRSNT: CPT | Performed by: NURSE PRACTITIONER

## 2025-01-30 PROCEDURE — 84550 ASSAY OF BLOOD/URIC ACID: CPT | Performed by: NURSE PRACTITIONER

## 2025-01-30 PROCEDURE — 3075F SYST BP GE 130 - 139MM HG: CPT | Performed by: NURSE PRACTITIONER

## 2025-01-30 PROCEDURE — 1160F RVW MEDS BY RX/DR IN RCRD: CPT | Performed by: NURSE PRACTITIONER

## 2025-01-30 PROCEDURE — 3078F DIAST BP <80 MM HG: CPT | Performed by: NURSE PRACTITIONER

## 2025-01-30 PROCEDURE — 1159F MED LIST DOCD IN RCRD: CPT | Performed by: NURSE PRACTITIONER

## 2025-01-30 PROCEDURE — 99214 OFFICE O/P EST MOD 30 MIN: CPT | Performed by: NURSE PRACTITIONER

## 2025-01-30 PROCEDURE — G0439 PPPS, SUBSEQ VISIT: HCPCS | Performed by: NURSE PRACTITIONER

## 2025-01-30 PROCEDURE — 80061 LIPID PANEL: CPT | Performed by: NURSE PRACTITIONER

## 2025-01-30 PROCEDURE — 80053 COMPREHEN METABOLIC PANEL: CPT | Performed by: NURSE PRACTITIONER

## 2025-01-30 PROCEDURE — 85025 COMPLETE CBC W/AUTO DIFF WBC: CPT | Performed by: NURSE PRACTITIONER

## 2025-01-30 RX ORDER — ALLOPURINOL 300 MG/1
300 TABLET ORAL DAILY
Qty: 90 TABLET | Refills: 1 | Status: SHIPPED | OUTPATIENT
Start: 2025-01-30

## 2025-01-30 RX ORDER — AMLODIPINE AND BENAZEPRIL HYDROCHLORIDE 5; 20 MG/1; MG/1
1 CAPSULE ORAL DAILY
Qty: 90 CAPSULE | Refills: 1 | Status: SHIPPED | OUTPATIENT
Start: 2025-01-30

## 2025-01-30 RX ORDER — METOPROLOL SUCCINATE 50 MG/1
50 TABLET, EXTENDED RELEASE ORAL DAILY
Qty: 90 TABLET | Refills: 1 | Status: SHIPPED | OUTPATIENT
Start: 2025-01-30

## 2025-01-30 RX ORDER — SILDENAFIL 100 MG/1
100 TABLET, FILM COATED ORAL DAILY PRN
Qty: 30 TABLET | Refills: 3 | Status: SHIPPED | OUTPATIENT
Start: 2025-01-30

## 2025-01-30 RX ORDER — ATORVASTATIN CALCIUM 20 MG/1
20 TABLET, FILM COATED ORAL NIGHTLY
Qty: 90 TABLET | Refills: 1 | Status: SHIPPED | OUTPATIENT
Start: 2025-01-30

## 2025-01-30 RX ORDER — MONTELUKAST SODIUM 10 MG/1
10 TABLET ORAL NIGHTLY
Qty: 90 TABLET | Refills: 1 | Status: SHIPPED | OUTPATIENT
Start: 2025-01-30

## 2025-01-30 NOTE — ASSESSMENT & PLAN NOTE
Orders:    Comprehensive Metabolic Panel    CBC & Differential    TSH    Lipid Panel    atorvastatin (LIPITOR) 20 MG tablet; Take 1 tablet by mouth Every Night.

## 2025-01-30 NOTE — ASSESSMENT & PLAN NOTE
Orders:    Comprehensive Metabolic Panel    CBC & Differential    TSH    Lipid Panel    amLODIPine-benazepril (LOTREL 5-20) 5-20 MG per capsule; Take 1 capsule by mouth Daily.    metoprolol succinate XL (Toprol XL) 50 MG 24 hr tablet; Take 1 tablet by mouth Daily.

## 2025-01-30 NOTE — PROGRESS NOTES
Subjective   The ABCs of the Annual Wellness Visit  Medicare Wellness Visit      Dion Espana is a 69 y.o. patient who presents for a Medicare Wellness Visit.    The following portions of the patient's history were reviewed and   updated as appropriate: allergies, current medications, past family history, past medical history, past social history, past surgical history, and problem list.    Compared to one year ago, the patient's physical   health is the same.  Compared to one year ago, the patient's mental   health is the same.    Recent Hospitalizations:  This patient has had a [facility] admission record on file within the last 365 days.  Current Medical Providers:  Patient Care Team:  Kenia Mclean APRN as PCP - General (Nurse Practitioner)  Silvana Alonso MD as Consulting Physician (Urology)    Outpatient Medications Prior to Visit   Medication Sig Dispense Refill    Krill Oil (Omega-3) 500 MG capsule Take 500 mg by mouth Daily.      Risankizumab-rzaa (SKYRIZI IV) Infuse  into a venous catheter.      allopurinol (ZYLOPRIM) 300 MG tablet Take 1 tablet by mouth Daily. 90 tablet 1    amLODIPine-benazepril (LOTREL 5-20) 5-20 MG per capsule Take 1 capsule by mouth Daily. 90 capsule 1    atorvastatin (LIPITOR) 20 MG tablet Take 1 tablet by mouth Every Night. 90 tablet 1    metoprolol succinate XL (Toprol XL) 50 MG 24 hr tablet Take 1 tablet by mouth Daily. (Patient taking differently: Take 0.5 tablets by mouth Daily. Takes 1/2 table q night) 90 tablet 1    montelukast (Singulair) 10 MG tablet Take 1 tablet by mouth Every Night. 90 tablet 1    sildenafil (Viagra) 100 MG tablet Take 1 tablet by mouth Daily As Needed for Erectile Dysfunction. 30 tablet 3    fluticasone (FLONASE) 50 MCG/ACT nasal spray Administer 2 sprays into the nostril(s) as directed by provider Daily. (Patient not taking: Reported on 1/30/2025) 16 g 0    tiZANidine (ZANAFLEX) 2 MG tablet Take 1 tablet by mouth At Night As  "Needed for Muscle Spasms. (Patient not taking: Reported on 1/30/2025) 20 tablet 0     No facility-administered medications prior to visit.     No opioid medication identified on active medication list. I have reviewed chart for other potential  high risk medication/s and harmful drug interactions in the elderly.      Aspirin is not on active medication list.  Aspirin use is not indicated based on review of current medical condition/s. Risk of harm outweighs potential benefits.  .    Patient Active Problem List   Diagnosis    Arthritis    Benign essential hypertension    Bone lesion    Displacement of lumbar intervertebral disc without myelopathy    Diverticulitis    Gout    Hearing loss    Hemorrhoids    History of prostate cancer    Hyperlipidemia    Lumbar radiculopathy    Lumbar spinal stenosis    Lumbar spondylosis    Paresthesia    Renal insufficiency    Sciatica    Tinnitus    Prostate cancer    Increasing PSA level after treatment for prostate cancer    SBO (small bowel obstruction)    Abnormal CT scan, gastrointestinal tract    Small bowel obstruction    Diarrhea    Helicobacter pylori stool test positive    Crohn's disease of both small and large intestine     Advance Care Planning Advance Directive is not on file.  ACP discussion was declined by the patient. Patient does not have an advance directive, information provided.            Objective   Vitals:    01/30/25 0736 01/30/25 0750   BP: 148/75 130/78   Pulse: 54    Resp: 16    Temp: 97.4 °F (36.3 °C)    SpO2: 96%    Weight: 95.4 kg (210 lb 6.4 oz)    Height: 177.8 cm (70\")    PainSc: 0-No pain        Estimated body mass index is 30.19 kg/m² as calculated from the following:    Height as of this encounter: 177.8 cm (70\").    Weight as of this encounter: 95.4 kg (210 lb 6.4 oz).                Does the patient have evidence of cognitive impairment? No  Lab Results   Component Value Date    TRIG 180 (H) 01/30/2025    HDL 33 (L) 01/30/2025    LDL 79 " 2025    VLDL 31 2025                                                                                                Health  Risk Assessment    Smoking Status:  Social History     Tobacco Use   Smoking Status Never   Smokeless Tobacco Never     Alcohol Consumption:  Social History     Substance and Sexual Activity   Alcohol Use Not Currently       Fall Risk Screen  STEADI Fall Risk Assessment was completed, and patient is at LOW risk for falls.Assessment completed on:2025    Depression Screening   Little interest or pleasure in doing things? Not at all   Feeling down, depressed, or hopeless? Not at all   PHQ-2 Total Score 0      Health Habits and Functional and Cognitive Screenin/30/2025     7:41 AM   Functional & Cognitive Status   Do you have difficulty preparing food and eating? No   Do you have difficulty bathing yourself, getting dressed or grooming yourself? No   Do you have difficulty using the toilet? No   Do you have difficulty moving around from place to place? No   Do you have trouble with steps or getting out of a bed or a chair? No   Current Diet Well Balanced Diet   Dental Exam Up to date   Eye Exam Up to date        Eye Exam Comment [facility]   Exercise (times per week) 5 times per week   Current Exercises Include Other        Exercise Comment farming   Do you need help using the phone?  No   Are you deaf or do you have serious difficulty hearing?  Yes   Do you need help to go to places out of walking distance? No   Do you need help shopping? No   Do you need help preparing meals?  No   Do you need help with housework?  No   Do you need help with laundry? No   Do you need help taking your medications? No   Do you need help managing money? No   Do you ever drive or ride in a car without wearing a seat belt? No   Have you felt unusual stress, anger or loneliness in the last month? No   Who do you live with? Spouse   If you need help, do you have trouble finding someone  available to you? No   Have you been bothered in the last four weeks by sexual problems? No   Do you have difficulty concentrating, remembering or making decisions? No           Age-appropriate Screening Schedule:  Refer to the list below for future screening recommendations based on patient's age, sex and/or medical conditions. Orders for these recommended tests are listed in the plan section. The patient has been provided with a written plan.    Health Maintenance List  Health Maintenance   Topic Date Due    COVID-19 Vaccine (7 - 2024-25 season) 09/01/2024    BMI FOLLOWUP  07/29/2025    ANNUAL WELLNESS VISIT  01/30/2026    LIPID PANEL  01/30/2026    COLORECTAL CANCER SCREENING  06/10/2029    TDAP/TD VACCINES (2 - Td or Tdap) 11/29/2034    HEPATITIS C SCREENING  Completed    INFLUENZA VACCINE  Completed    Pneumococcal Vaccine 65+  Completed    ZOSTER VACCINE  Completed                                                                                                                                                CMS Preventative Services Quick Reference  Risk Factors Identified During Encounter  Fall Risk-High or Moderate: Discussed Fall Prevention in the home  Immunizations Discussed/Encouraged: Tdap, Influenza, Prevnar 20 (Pneumococcal 20-valent conjugate), Shingrix, and RSV (Respiratory Syncytial Virus)  Dental Screening Recommended  Vision Screening Recommended    The above risks/problems have been discussed with the patient.  Pertinent information has been shared with the patient in the After Visit Summary.  An After Visit Summary and PPPS were made available to the patient.    Follow Up:   Next Medicare Wellness visit to be scheduled in 1 year.         Additional E&M Note during same encounter follows:  Patient has additional, significant, and separately identifiable condition(s)/problem(s) that require work above and beyond the Medicare Wellness Visit     Chief Complaint  Medicare Wellness-subsequent,  "Hyperlipidemia, Hypertension, and Ear Fullness (States left ear pops)    Subjective   HPI  Dion is also being seen today for additional medical problem/s.  Hypertension:  Patient is taking Amlodipine Benazepril, Metoprolol.    No chest pain or shortness of breath.    Hyperlipidemia:  He is taking atorvastatin, Fish Oil but saw that his triglycerides had increased the last time.  Gout:  He is on allopurinol with good control--no flares recently.    Allergies: He is on singular and flonase and doing ok with his medications.   ED/Prostate Cancer (removed):  He takes the viagra as needed and has good results when using.   Colitis/GI: He is taking his infusions.  He has not had any flares recently.              Objective   Vital Signs:  /78   Pulse 54   Temp 97.4 °F (36.3 °C)   Resp 16   Ht 177.8 cm (70\")   Wt 95.4 kg (210 lb 6.4 oz)   SpO2 96%   BMI 30.19 kg/m²   Physical Exam  Vitals reviewed.   Constitutional:       Appearance: Normal appearance. He is well-developed.   HENT:      Right Ear: Ear canal normal.      Left Ear: Ear canal normal.      Ears:      Comments: Fluid noted bilateral TM     Nose: Congestion and rhinorrhea present.      Mouth/Throat:      Pharynx: Posterior oropharyngeal erythema present.   Cardiovascular:      Rate and Rhythm: Normal rate and regular rhythm.      Heart sounds: Normal heart sounds. No murmur heard.  Pulmonary:      Effort: Pulmonary effort is normal.      Breath sounds: Normal breath sounds.   Neurological:      Mental Status: He is alert and oriented to person, place, and time.      Cranial Nerves: No cranial nerve deficit.      Motor: No weakness.   Psychiatric:         Mood and Affect: Mood and affect normal.                       Assessment and Plan            Medicare annual wellness visit, subsequent         Gout, unspecified cause, unspecified chronicity, unspecified site    Orders:    allopurinol (ZYLOPRIM) 300 MG tablet; Take 1 tablet by mouth Daily.    " Uric Acid    Benign essential hypertension      Orders:    Comprehensive Metabolic Panel    CBC & Differential    TSH    Lipid Panel    amLODIPine-benazepril (LOTREL 5-20) 5-20 MG per capsule; Take 1 capsule by mouth Daily.    metoprolol succinate XL (Toprol XL) 50 MG 24 hr tablet; Take 1 tablet by mouth Daily.    Pure hypercholesterolemia       Orders:    Comprehensive Metabolic Panel    CBC & Differential    TSH    Lipid Panel    atorvastatin (LIPITOR) 20 MG tablet; Take 1 tablet by mouth Every Night.    Allergy, subsequent encounter    Orders:    montelukast (Singulair) 10 MG tablet; Take 1 tablet by mouth Every Night.    Erectile dysfunction, unspecified erectile dysfunction type  Aware of the risk versus benefits  Orders:    sildenafil (Viagra) 100 MG tablet; Take 1 tablet by mouth Daily As Needed for Erectile Dysfunction.            Follow Up   Return in about 6 months (around 7/30/2025).  Patient was given instructions and counseling regarding his condition or for health maintenance advice. Please see specific information pulled into the AVS if appropriate.  Parts of this note are electronic transcriptions/translations of spoken language to printed text using the Dragon Dictation system.  Discussed that his hearing aids have been a little muffled and he hears popping and ringing tones in his ears but there is no earwax noted.  He would benefit from the Flonase over-the-counter.  We can refer to ENT if needed  Kenia Mclean, APRN  01/30/2025

## 2025-01-30 NOTE — PATIENT INSTRUCTIONS
Advance Directive    Advance directives are legal papers that state your wishes about health care decisions. They let your wishes be known to family, friends, and health care providers if you become unable to speak for yourself.   You should write these papers out over time rather than all at once. They can be changed and updated at any time.  The types of advance directives include:  Medical power of  (POA).  Living chabmers.  Do not resuscitate (DNR) or do not attempt resuscitation (DNAR) orders.  What are a health care proxy and medical POA?  A health care proxy is also called a health care agent. It's a person you choose to make medical decisions for you when you can't make them for yourself. In most cases, a proxy is a trusted friend or family member.  A medical POA is legal paperwork that names your proxy. It may need to be:  Signed.  Notarized.  Dated.  Copied.  Witnessed.  Added to your medical record.  You may also want to choose someone to handle your money if you can't do so. This is called a durable POA for finances. It's separate from a medical POA. You may choose your health care proxy or someone else to act as your agent in money matters.  If you don't have a proxy, or if the proxy may not be acting in your best interest, a court may choose a guardian to act on your behalf.  What is a living will?  A living will is legal paperwork that states your wishes about medical care. Providers should keep a copy of it in your medical record. You may want to give a copy to family members or friends. You can also keep a card in your wallet to let loved ones know you have a living will and where they can find it. A living will may be used if:  You're very sick with something that will end your life.  You become disabled.  You can't make decisions or speak for yourself.  Your living will should include whether:  To use or not use life support equipment. This may include machines to filter your blood or to help  you breathe.  You want a DNR or DNAR order. This tells providers not to use CPR if your heart or breathing stops.  To use or not use tube feeding.  You want to be given foods and fluids.  You want a type of comfort care called palliative care. This may be given when the goal for treatment becomes comfort rather than a cure.  You want to donate your organs and tissues.  A living will doesn't say what to do with your money and property if you pass away.  What is a DNR or DNAR?  A DNR or DNAR order is a request not to have CPR. If you don't have one of these orders, a provider will try to help you if your heart stops or you stop breathing.   If you plan to have surgery, talk with your provider about your DNR or DNAR order.  What happens if I don't have an advance directive?  Each state has its own laws about advance directives. Some states assign family decision makers to act on your behalf if you don't have an advance directive.   Check with your provider, , or state representative about the laws in your state.  Where to find more information  Each state has its own laws about advance directives. You can look up these laws at: [facility]  This information is not intended to replace advice given to you by your health care provider. Make sure you discuss any questions you have with your health care provider.  Document Revised: 05/06/2024 Document Reviewed: 05/06/2024  Elsevier Patient Education © 2024 [employer]

## 2025-02-03 ENCOUNTER — LAB (OUTPATIENT)
Dept: LAB | Facility: HOSPITAL | Age: 70
End: 2025-02-03
Payer: MEDICARE

## 2025-02-03 DIAGNOSIS — C61 PROSTATE CANCER: ICD-10-CM

## 2025-02-03 DIAGNOSIS — K50.812 CROHN'S DISEASE OF BOTH SMALL AND LARGE INTESTINE WITH INTESTINAL OBSTRUCTION: ICD-10-CM

## 2025-02-03 LAB
ALBUMIN SERPL-MCNC: 4.1 G/DL (ref 3.5–5.2)
ALBUMIN/GLOB SERPL: 1.5 G/DL
ALP SERPL-CCNC: 102 U/L (ref 39–117)
ALT SERPL W P-5'-P-CCNC: 17 U/L (ref 1–41)
ANION GAP SERPL CALCULATED.3IONS-SCNC: 10.9 MMOL/L (ref 5–15)
AST SERPL-CCNC: 21 U/L (ref 1–40)
BASOPHILS # BLD AUTO: 0.06 10*3/MM3 (ref 0–0.2)
BASOPHILS NFR BLD AUTO: 1 % (ref 0–1.5)
BILIRUB SERPL-MCNC: 0.7 MG/DL (ref 0–1.2)
BUN SERPL-MCNC: 15 MG/DL (ref 8–23)
BUN/CREAT SERPL: 15.5 (ref 7–25)
CALCIUM SPEC-SCNC: 8.9 MG/DL (ref 8.6–10.5)
CHLORIDE SERPL-SCNC: 103 MMOL/L (ref 98–107)
CO2 SERPL-SCNC: 25.1 MMOL/L (ref 22–29)
CREAT SERPL-MCNC: 0.97 MG/DL (ref 0.76–1.27)
CRP SERPL-MCNC: <0.3 MG/DL (ref 0–0.5)
DEPRECATED RDW RBC AUTO: 42 FL (ref 37–54)
EGFRCR SERPLBLD CKD-EPI 2021: 85 ML/MIN/1.73
EOSINOPHIL # BLD AUTO: 0.15 10*3/MM3 (ref 0–0.4)
EOSINOPHIL NFR BLD AUTO: 2.6 % (ref 0.3–6.2)
ERYTHROCYTE [DISTWIDTH] IN BLOOD BY AUTOMATED COUNT: 13 % (ref 12.3–15.4)
ERYTHROCYTE [SEDIMENTATION RATE] IN BLOOD: 14 MM/HR (ref 0–20)
GLOBULIN UR ELPH-MCNC: 2.8 GM/DL
GLUCOSE SERPL-MCNC: 92 MG/DL (ref 65–99)
HCT VFR BLD AUTO: 41.2 % (ref 37.5–51)
HGB BLD-MCNC: 14 G/DL (ref 13–17.7)
IMM GRANULOCYTES # BLD AUTO: 0.03 10*3/MM3 (ref 0–0.05)
IMM GRANULOCYTES NFR BLD AUTO: 0.5 % (ref 0–0.5)
IRON 24H UR-MRATE: 71 MCG/DL (ref 59–158)
IRON SATN MFR SERPL: 20 % (ref 20–50)
LYMPHOCYTES # BLD AUTO: 1.87 10*3/MM3 (ref 0.7–3.1)
LYMPHOCYTES NFR BLD AUTO: 32.4 % (ref 19.6–45.3)
MCH RBC QN AUTO: 30.6 PG (ref 26.6–33)
MCHC RBC AUTO-ENTMCNC: 34 G/DL (ref 31.5–35.7)
MCV RBC AUTO: 90.2 FL (ref 79–97)
MONOCYTES # BLD AUTO: 0.79 10*3/MM3 (ref 0.1–0.9)
MONOCYTES NFR BLD AUTO: 13.7 % (ref 5–12)
NEUTROPHILS NFR BLD AUTO: 2.87 10*3/MM3 (ref 1.7–7)
NEUTROPHILS NFR BLD AUTO: 49.8 % (ref 42.7–76)
NRBC BLD AUTO-RTO: 0 /100 WBC (ref 0–0.2)
PLATELET # BLD AUTO: 256 10*3/MM3 (ref 140–450)
PMV BLD AUTO: 9.4 FL (ref 6–12)
POTASSIUM SERPL-SCNC: 4 MMOL/L (ref 3.5–5.2)
PROT SERPL-MCNC: 6.9 G/DL (ref 6–8.5)
PSA SERPL-MCNC: 0.27 NG/ML (ref 0–4)
RBC # BLD AUTO: 4.57 10*6/MM3 (ref 4.14–5.8)
SODIUM SERPL-SCNC: 139 MMOL/L (ref 136–145)
TIBC SERPL-MCNC: 358 MCG/DL (ref 298–536)
TRANSFERRIN SERPL-MCNC: 240 MG/DL (ref 200–360)
WBC NRBC COR # BLD AUTO: 5.77 10*3/MM3 (ref 3.4–10.8)

## 2025-02-03 PROCEDURE — 86140 C-REACTIVE PROTEIN: CPT

## 2025-02-03 PROCEDURE — 85652 RBC SED RATE AUTOMATED: CPT

## 2025-02-03 PROCEDURE — 83540 ASSAY OF IRON: CPT

## 2025-02-03 PROCEDURE — 36415 COLL VENOUS BLD VENIPUNCTURE: CPT

## 2025-02-03 PROCEDURE — 85025 COMPLETE CBC W/AUTO DIFF WBC: CPT

## 2025-02-03 PROCEDURE — 84466 ASSAY OF TRANSFERRIN: CPT

## 2025-02-03 PROCEDURE — 84153 ASSAY OF PSA TOTAL: CPT

## 2025-02-03 PROCEDURE — 80053 COMPREHEN METABOLIC PANEL: CPT

## 2025-02-06 NOTE — PROGRESS NOTES
"Chief Complaint  Prostate Cancer    Subjective            Dion Espana presents to [facility]    The patient presents today for a follow-up.     The patient is doing well. His PSA is slowly rising. He reports that he has under gone a prostatectomy in 2016.         History of Present Illness  69 yo male s/p RALP with bilateral PLND for intermediate risk prostate cancer.   Surgery in Sept 2016  PSA 4.7  Biopsy - 3+4 L mid and 3+3 R base  cT1c     Final Path - Surgery in Sept.   [place] 3+4 ([facility])  [id number]  margins were negative     His PSA is slowly rising.   1 year ago it was 0.165 and then was 0.180 6 months ago.  In October 2023 it was 0.170.  In April 2024 it was 0.240 and 0.281 in July 2024.  It was 0.31 in Oct 2024.  It is now 0.269 in Feb 2025.      He has no other complaints.    History of Present Illness  The patient is here for a follow-up of prostate cancer.    He reports a general sense of well-being with no specific complaints at this time. He has been conducting personal research on various treatment modalities for his condition, including Ivermectin, which he has observed to be beneficial in animal studies.         Objective   Vital Signs:   Resp 16   Ht 177.8 cm (70\")   Wt 95.3 kg (210 lb)   BMI 30.13 kg/m²       Physical Exam  Vitals and nursing note reviewed.   Constitutional:       Appearance: Normal appearance. He is well-developed.   Pulmonary:      Effort: Pulmonary effort is normal.      Breath sounds: Normal air entry.   Neurological:      Mental Status: He is alert and oriented to person, place, and time.      Motor: Motor function is intact.   Psychiatric:         Mood and Affect: Mood normal.         Behavior: Behavior normal.            Result Review :   The following data was reviewed by: Silvana Alonso MD on 02/07/2025:    Results for orders placed or performed in visit on 02/07/25   POC Urinalysis Dipstick, Automated    Collection Time: " 02/07/25  9:13 AM    Specimen: Urine   Result Value Ref Range    Color Dark Yellow Yellow, Straw, Dark Yellow, Marce    Clarity, UA Slightly Cloudy (A) Clear    Specific Gravity  1.025 1.005 - 1.030    pH, Urine 6.0 5.0 - 8.0    Leukocytes Negative Negative    Nitrite, UA Negative Negative    Protein, POC Negative Negative mg/dL    Glucose, UA Negative Negative mg/dL    Ketones, UA Negative Negative    Urobilinogen, UA 0.2 E.U./dL Normal, 0.2 E.U./dL    Bilirubin Negative Negative    Blood, UA Negative Negative    Lot Number [id number]     Expiration Date 10/2,025      PSA          7/29/2024    08:03 10/30/2024    09:25 2/3/2025    09:42   PSA   PSA 0.281  0.313  0.269        Results  Laboratory Studies  PSA is 0.26.    Imaging  PET scan showed no obvious concerns.            Assessment and Plan    Diagnoses and all orders for this visit:    1. Prostate cancer (Primary)  -     PSA DIAGNOSTIC; Future  -     POC Urinalysis Dipstick, Automated  -     PSA DIAGNOSTIC; Future      Assessment & Plan  1. Prostate cancer.  His Prostate-Specific Antigen (PSA) levels have shown a slight decrease, currently at 0.26, which is marginally above his levels from approximately 2 years ago. The PET scan results were unremarkable, with no discernible abnormalities. He will continue to monitor his PSA levels every 4 months. An order for blood work has been placed, and he is advised to inform the clinic of any changes in his condition prior to his next appointment.    Follow-up  The patient will follow up in 4 months.            Follow Up       Return in about 4 months (around 6/7/2025) for Next scheduled follow up, PSA prior to visit.  Patient was given instructions and counseling regarding his condition or for health maintenance advice. Please see specific information pulled into the AVS if appropriate.     Transcribed from ambient dictation for Silvana Alonso MD by Silvana Alonso MD.  02/06/25   14:22 EST    Patient or patient  representative verbalized consent for the use of Ambient Listening during the visit with  Silvana Alonso MD for chart documentation. 2/7/2025  14:24 EST

## 2025-02-07 ENCOUNTER — OFFICE VISIT (OUTPATIENT)
Dept: UROLOGY | Age: 70
End: 2025-02-07
Payer: MEDICARE

## 2025-02-07 ENCOUNTER — PATIENT ROUNDING (BHMG ONLY) (OUTPATIENT)
Dept: FAMILY MEDICINE CLINIC | Facility: CLINIC | Age: 70
End: 2025-02-07
Payer: MEDICARE

## 2025-02-07 VITALS — BODY MASS INDEX: 30.06 KG/M2 | WEIGHT: 210 LBS | HEIGHT: 70 IN | RESPIRATION RATE: 16 BRPM

## 2025-02-07 DIAGNOSIS — C61 PROSTATE CANCER: Primary | ICD-10-CM

## 2025-02-07 LAB
BILIRUB BLD-MCNC: NEGATIVE MG/DL
CLARITY, POC: ABNORMAL
COLOR UR: ABNORMAL
EXPIRATION DATE: ABNORMAL
GLUCOSE UR STRIP-MCNC: NEGATIVE MG/DL
KETONES UR QL: NEGATIVE
LEUKOCYTE EST, POC: NEGATIVE
Lab: ABNORMAL
NITRITE UR-MCNC: NEGATIVE MG/ML
PH UR: 6 [PH] (ref 5–8)
PROT UR STRIP-MCNC: NEGATIVE MG/DL
RBC # UR STRIP: NEGATIVE /UL
SP GR UR: 1.02 (ref 1–1.03)
UROBILINOGEN UR QL: ABNORMAL

## 2025-04-01 ENCOUNTER — OFFICE VISIT (OUTPATIENT)
Dept: GASTROENTEROLOGY | Facility: CLINIC | Age: 70
End: 2025-04-01
Payer: MEDICARE

## 2025-04-01 VITALS
SYSTOLIC BLOOD PRESSURE: 140 MMHG | HEART RATE: 56 BPM | DIASTOLIC BLOOD PRESSURE: 80 MMHG | WEIGHT: 216 LBS | BODY MASS INDEX: 30.92 KG/M2 | HEIGHT: 70 IN

## 2025-04-01 DIAGNOSIS — Z87.19 HISTORY OF SMALL BOWEL OBSTRUCTION: ICD-10-CM

## 2025-04-01 DIAGNOSIS — Z86.0100 HISTORY OF COLON POLYPS: ICD-10-CM

## 2025-04-01 DIAGNOSIS — K50.80 CROHN'S DISEASE OF BOTH SMALL AND LARGE INTESTINE WITHOUT COMPLICATION: Primary | ICD-10-CM

## 2025-04-01 NOTE — PROGRESS NOTES
Patient Name: Dion Espana   Visit Date: 04/01/2025   Patient ID: [id number]  Provider: NICOLAS Muhammad    Sex: male  Location:  Location Address:  Location Phone: [phone]  [place] [address] [phone]    YOB: 1955  Age: 70 y.o.   Primary Care Provider Kenia Mclean APRN      Referring Provider: No ref. provider found        Chief Complaint  Crohn's Disease (Follow up )    History of Present Illness    Initial visit April 22-referred for fatty liver, liver workup was negative 4/5/2022.     History of colonoscopy in 2021 by Dr. Barth-adenomatous polyp removed from the cecum, grade 1 internal hemorrhoids, erythema noted around IC valve, biopsy showed some active inflammation, Patient asymptomatic at the time     Patient presented to ER 5/2/2024 with abdominal pain bloating and 1 episode of vomiting, diagnosed with small bowel obstruction on CT, patient was given IV hydrocortisone and discharged on prednisone taper, elevated CRP and normal sed rate noted in the hospital, Fecal calprotectin markedly elevated so 4230  Hpylori stool Ag + (done by PCP before admission) pt had not been treated yet      CT enterography 5/2/2024: Stomach moderately distended, submucosal fat with wall thickening of the terminal ileum appears similar to previous exam with several mildly dilated more proximal small bowel loops-suggest terminal ileitis with more proximal partial small bowel obstruction, also noted is a sclerotic lesion at T11, patient had a follow-up bone scan primary care that was normal     EGD 6/10/2024: Hiatal hernia, normal esophagus, normal stomach-biopsy with mild chronic inactive gastritis otherwise negative, normal duodenum. Bx negative for hpylori   Colonoscopy 6/10/2024: Good prep, nonbleeding erosions at the ileocecal valve, benign-appearing intrinsic mild stenosis at the ileocecal valve was not traversed, biopsy taken-chronic ileocolitis with moderate to severe  activity and ulcer, no granulomas, terminal ileum appeared normal, diverticula found in the sigmoid     Pt started Skyrizi 7/18/24    History of Present Illness  The patient presents for Crohn's follow up.    He reports a general sense of well-being, with no abdominal discomfort or diarrhea. His bowel movements are regular, occurring once or twice daily, depending on his food intake. The stools are well-formed, and he has not observed any blood. His appetite is great. He is currently on Skyrizi, which he tolerates well without any adverse effects.    He states he was previously diagnosed with fatty liver disease and has a preference for sweet foods. R/w pt last imaging in May 2024 showed normal liver and recently normal LFTs.    Related MEDICATIONS  Current: Skyrizi, amlodipine      Past Medical History:   Diagnosis Date    Arthritis     Bone lesion 05/21/2020    Crohn's disease of both small and large intestine 06/25/2024    Diverticulitis     Elevated cholesterol     Essential hypertension 10/15/2020    Gout 10/15/2020    Headache     Hearing loss     Hemorrhoids     Hyperlipidemia 10/15/2020    Lumbago 07/13/2017    LOW BACK PAIN    Lumbar disc herniation 07/13/2017    LEFT; L3-4    Lumbar spinal stenosis 05/21/2020    Paresthesia 05/21/2020    Prostate cancer 05/21/2020    Prostate cancer     Radiculopathy, lumbosacral region 05/21/2020    Renal insufficiency 10/15/2020    Sciatica 07/13/2017    Small bowel obstruction 2024    Tinnitus        Past Surgical History:   Procedure Laterality Date    BACK SURGERY      X2    CARPAL TUNNEL RELEASE      CHOLECYSTECTOMY      COLONOSCOPY  2006    DR HADLEY    COLONOSCOPY N/A 6/10/2024    Procedure: COLONOSCOPY WITH BIOPSIES;  Surgeon: Kevin Barth MD;  [facility];  Service: Gastroenterology;  Laterality: N/A;  COLON ERROSIONS, DIVERTICULOSIS    ENDOSCOPY N/A 6/10/2024    Procedure: ESOPHAGOGASTRODUODENOSCOPY WITH BIOPSIES;  Surgeon: Kevin Barth  "MD Senthil;  Location: [facility] ENDOSCOPY;  Service: Gastroenterology;  Laterality: N/A;  HIATAL HERNIA    HAND SURGERY      THUMB SURGERY    KNEE ARTHROSCOPY Bilateral     LUMBAR DISCECTOMY  07/19/2017    L3-4; DR UGARTE; MINIMALLY INVASIVE    OTHER SURGICAL HISTORY      FISTULA    PROSTATE BIOPSY      PROSTATECTOMY      VASECTOMY         No Known Allergies    Family History   Problem Relation Age of Onset    Arthritis Mother     Stroke Mother     Heart disease Mother     Diabetes Mother     Heart disease Father     Stroke Brother     Colon cancer Neg Hx         Social History     Tobacco Use    Smoking status: Never    Smokeless tobacco: Never   Vaping Use    Vaping status: Never Used   Substance Use Topics    Alcohol use: Not Currently    Drug use: Never       Objective     Vital Signs:   /80 (BP Location: Left arm, Patient Position: Sitting, Cuff Size: Adult)   Pulse 56   Ht 177.8 cm (70\")   Wt 98 kg (216 lb)   BMI 30.99 kg/m²       Physical Exam  Constitutional:       General: The patient is not in acute distress.     Appearance: Normal appearance.   HENT:      Head: Normocephalic and atraumatic.      Nose: Nose normal.   Pulmonary:      Effort: Pulmonary effort is normal. No respiratory distress.   Abdominal:      General: Abdomen is flat.      Palpations: Abdomen is soft. There is no mass.      Tenderness: There is no abdominal tenderness. There is no guarding.   Musculoskeletal:      Cervical back: Neck supple.      Right lower leg: No edema.      Left lower leg: No edema.   Skin:     General: Skin is warm and dry.   Neurological:      General: No focal deficit present.      Mental Status: The patient is alert and oriented to person, place, and time.      Gait: Gait normal.   Psychiatric:         Mood and Affect: Mood normal.         Speech: Speech normal.         Behavior: Behavior normal.         Thought Content: Thought content normal.     Result Review :   The following data was reviewed by: " Lucy Devi, APRN on 04/01/2025:    CBC w/diff          10/30/2024    09:25 1/30/2025    08:15 2/3/2025    09:42   CBC w/Diff   WBC 5.54  5.57  5.77    RBC 4.49  4.72  4.57    Hemoglobin 13.5  14.3  14.0    Hematocrit 40.5  42.5  41.2    MCV 90.2  90.0  90.2    MCH 30.1  30.3  30.6    MCHC 33.3  33.6  34.0    RDW 13.1  12.8  13.0    Platelets 252  286  256    Neutrophil Rel %  55.5  49.8    Immature Granulocyte Rel %  0.5  0.5    Lymphocyte Rel %  26.8  32.4    Monocyte Rel %  13.6  13.7    Eosinophil Rel %  2.5  2.6    Basophil Rel %  1.1  1.0      CMP          10/30/2024    09:25 1/30/2025    08:15 2/3/2025    09:42   CMP   Glucose 92  92  92    BUN 13  15  15    Creatinine 0.91  1.11  0.97    EGFR 91.8  72.3  85.0    Sodium 140  139  139    Potassium 3.9  4.1  4.0    Chloride 105  102  103    Calcium 8.8  9.1  8.9    Total Protein 6.9  7.2  6.9    Albumin 3.9  4.1  4.1    Globulin 3.0  3.1  2.8    Total Bilirubin 0.7  0.9  0.7    Alkaline Phosphatase 92  104  102    AST (SGOT) 18  22  21    ALT (SGPT) 17  24  17    Albumin/Globulin Ratio 1.3  1.3  1.5    BUN/Creatinine Ratio 14.3  13.5  15.5    Anion Gap 9.7  10.0  10.9        Iron Profile   Iron   Date Value Ref Range Status   02/03/2025 71 59 - 158 mcg/dL Final     TIBC   Date Value Ref Range Status   02/03/2025 358 298 - 536 mcg/dL Final     Iron Saturation (TSAT)   Date Value Ref Range Status   02/03/2025 20 20 - 50 % Final     Transferrin   Date Value Ref Range Status   02/03/2025 240 200 - 360 mg/dL Final     Ferritin   Ferritin   Date Value Ref Range Status   04/05/2022 150.00 30.00 - 400.00 ng/mL Final     ESR (Sed Rate)   Sed Rate   Date Value Ref Range Status   02/03/2025 14 0 - 20 mm/hr Final     CRP (C-Reactive)   C-Reactive Protein   Date Value Ref Range Status   02/03/2025 <0.30 0.00 - 0.50 mg/dL Final               Assessment and Plan    Diagnoses and all orders for this visit:    1. Crohn's disease of both small and large intestine  without complication (Primary)  -     Case Request; Standing  -     Case Request    2. History of small bowel obstruction  -     Case Request; Standing  -     Case Request    3. History of colon polyps  -     Case Request; Standing  -     Case Request    Other orders  -     polyethylene glycol (GoLYTELY) 236 g solution; Take per office instructions  Dispense: 4000 mL; Refill: 0  -     Follow Anesthesia Guidelines / Protocol; Standing  -     Follow Anesthesia Guidelines / Protocol; Future  -     Verify NPO; Standing          Assessment & Plan  1. Bowel management.  He is currently on Skyrizi and reports no issues with receiving or injecting the medication. He has been informed to take Skyrizi every 8 weeks. A repeat colonoscopy is scheduled for June 2025 as a 1-year follow-up to monitor the effectiveness of the treatment. He is advised to use flavor packets to make the preparation more palatable.    2. Fatty liver.  He was previously informed about having a fatty liver. A CT scan of the abdomen with contrast in May 2024 showed a normal liver. His liver function tests from February 3, 2025, are normal. He is advised to lose weight and follow a low-carb, low-sugar diet. Foods high in sugar, such as sweets, pasta, rice, potatoes, crackers, chips, and cereal, should be avoided. A diet rich in meat and vegetables, excluding potatoes, is recommended. Coffee is also suggested as beneficial for the liver.            Follow Up   Return in about 6 months (around 10/1/2025).    Patient or patient representative verbalized consent for the use of Ambient Listening during the visit with  NICOLAS Muhammad for chart documentation. 4/1/2025  08:52 EDT    Patient was given instructions and counseling regarding his condition or for health maintenance advice. Please see specific information pulled into the AVS if appropriate.

## 2025-06-03 NOTE — PAT
Reviewed the following with patient.    Arrival time of 0700.    Must have  over 18 for transportation home post procedure. Come to [facility], entrance [facility].     Education provided on laxative administration; bowel prep to be taken in two doses. Reviewed diet instructions for day prior to procedure. Only plain, unflavored water after midnight until two hours prior to arrival time.     Do not take any morning medications on the day of the procedure. Instead bring all prescribed medication and inhalers to the hospital the morning of the procedure. May take any nebulizer treatments or inhalers the morning of the procedure.     Plan to be here 3-4 hours.   Do not bring any valuables to hospital with you. Call Endo department for any questions.     Pt verbalized understanding of instructions.

## 2025-06-10 ENCOUNTER — ANESTHESIA EVENT (OUTPATIENT)
Dept: GASTROENTEROLOGY | Facility: HOSPITAL | Age: 70
End: 2025-06-10
Payer: MEDICARE

## 2025-06-10 NOTE — ANESTHESIA PREPROCEDURE EVALUATION
Anesthesia Evaluation     Patient summary reviewed and Nursing notes reviewed   NPO Solid Status: > 8 hours  NPO Liquid Status: > 2 hours           Airway   Mallampati: III  TM distance: >3 FB  Neck ROM: full  No difficulty expected  Dental          Pulmonary    Cardiovascular     ECG reviewed  Patient on routine beta blocker and Beta blocker given within 24 hours of surgery  Rhythm: regular  Rate: normal    (+) hypertension, hyperlipidemia      Neuro/Psych  (+) headaches, numbness  GI/Hepatic/Renal/Endo      Musculoskeletal     Abdominal    Substance History      OB/GYN          Other   arthritis,   history of cancer (Hx of Prostate CA) remission    ROS/Med Hx Other: EKG, 7/01/20: HR 63, SR, borderline intraventricular conductive delay    Follow up for Dx of Crohn's. Had EGD/Colonoscopy in 5/2024.                      Anesthesia Plan    ASA 2     general     (Total IV Anesthesia    Patient understands anesthesia not responsible for dental damage.    Discussed risks with pt including aspiration, allergic reactions, apnea, advanced airway placement. Pt verbalized understanding. All questions answered.   )  intravenous induction     Anesthetic plan, risks, benefits, and alternatives have been provided, discussed and informed consent has been obtained with: patient.    Plan discussed with CRNA.        CODE STATUS:

## 2025-06-11 ENCOUNTER — ANESTHESIA (OUTPATIENT)
Dept: GASTROENTEROLOGY | Facility: HOSPITAL | Age: 70
End: 2025-06-11
Payer: MEDICARE

## 2025-06-11 ENCOUNTER — HOSPITAL ENCOUNTER (OUTPATIENT)
Facility: HOSPITAL | Age: 70
Setting detail: HOSPITAL OUTPATIENT SURGERY
Discharge: HOME | End: 2025-06-11
Attending: INTERNAL MEDICINE | Admitting: INTERNAL MEDICINE
Payer: MEDICARE

## 2025-06-11 VITALS
WEIGHT: 210.1 LBS | HEART RATE: 45 BPM | BODY MASS INDEX: 29.41 KG/M2 | HEIGHT: 71 IN | DIASTOLIC BLOOD PRESSURE: 80 MMHG | TEMPERATURE: 97 F | SYSTOLIC BLOOD PRESSURE: 130 MMHG | OXYGEN SATURATION: 97 % | RESPIRATION RATE: 16 BRPM

## 2025-06-11 DIAGNOSIS — Z86.0100 HISTORY OF COLON POLYPS: ICD-10-CM

## 2025-06-11 DIAGNOSIS — K50.80 CROHN'S DISEASE OF BOTH SMALL AND LARGE INTESTINE WITHOUT COMPLICATION: ICD-10-CM

## 2025-06-11 DIAGNOSIS — Z87.19 HISTORY OF SMALL BOWEL OBSTRUCTION: ICD-10-CM

## 2025-06-11 PROCEDURE — 25810000003 LACTATED RINGERS PER 1000 ML: Performed by: NURSE ANESTHETIST, CERTIFIED REGISTERED

## 2025-06-11 PROCEDURE — 88305 TISSUE EXAM BY PATHOLOGIST: CPT | Performed by: INTERNAL MEDICINE

## 2025-06-11 PROCEDURE — 25010000002 PROPOFOL 10 MG/ML EMULSION: Performed by: NURSE ANESTHETIST, CERTIFIED REGISTERED

## 2025-06-11 PROCEDURE — 25010000002 LIDOCAINE PF 2% 2 % SOLUTION: Performed by: NURSE ANESTHETIST, CERTIFIED REGISTERED

## 2025-06-11 PROCEDURE — 45380 COLONOSCOPY AND BIOPSY: CPT | Performed by: INTERNAL MEDICINE

## 2025-06-11 RX ORDER — PROPOFOL 10 MG/ML
VIAL (ML) INTRAVENOUS AS NEEDED
Status: DISCONTINUED | OUTPATIENT
Start: 2025-06-11 | End: 2025-06-11 | Stop reason: SURG

## 2025-06-11 RX ORDER — SODIUM CHLORIDE, SODIUM LACTATE, POTASSIUM CHLORIDE, CALCIUM CHLORIDE 600; 310; 30; 20 MG/100ML; MG/100ML; MG/100ML; MG/100ML
30 INJECTION, SOLUTION INTRAVENOUS CONTINUOUS
Status: DISCONTINUED | OUTPATIENT
Start: 2025-06-11 | End: 2025-06-11 | Stop reason: HOSPADM

## 2025-06-11 RX ORDER — LIDOCAINE HYDROCHLORIDE 20 MG/ML
INJECTION, SOLUTION EPIDURAL; INFILTRATION; INTRACAUDAL; PERINEURAL AS NEEDED
Status: DISCONTINUED | OUTPATIENT
Start: 2025-06-11 | End: 2025-06-11 | Stop reason: SURG

## 2025-06-11 RX ADMIN — PROPOFOL 100 MG: 10 INJECTION, EMULSION INTRAVENOUS at 08:20

## 2025-06-11 RX ADMIN — SODIUM CHLORIDE, POTASSIUM CHLORIDE, SODIUM LACTATE AND CALCIUM CHLORIDE 30 ML/HR: 600; 310; 30; 20 INJECTION, SOLUTION INTRAVENOUS at 07:43

## 2025-06-11 RX ADMIN — PROPOFOL 175 MCG/KG/MIN: 10 INJECTION, EMULSION INTRAVENOUS at 08:20

## 2025-06-11 RX ADMIN — LIDOCAINE HYDROCHLORIDE 50 MG: 20 INJECTION, SOLUTION EPIDURAL; INFILTRATION; INTRACAUDAL; PERINEURAL at 08:20

## 2025-06-11 NOTE — ANESTHESIA POSTPROCEDURE EVALUATION
Patient: Dion Espana    Procedure Summary       Date: 06/11/25 Room / Location: [facility] ENDOSCOPY 4 / [facility] ENDOSCOPY    Anesthesia Start: 0816 Anesthesia Stop: 0902    Procedure: COLONOSCOPY with biopsies Diagnosis:       Crohn's disease of both small and large intestine without complication      History of small bowel obstruction      History of colon polyps      (Crohn's disease of both small and large intestine without complication [K50.80])      (History of small bowel obstruction [Z87.19])      (History of colon polyps [Z86.0100])    Surgeons: Kevin Barth MD Provider: Tom Vega CRNA    Anesthesia Type: general ASA Status: 2            Anesthesia Type: general    Vitals  Vitals Value Taken Time   /80 06/11/25 09:21   Temp 36.1 °C (97 °F) 06/11/25 09:01   Pulse 47 06/11/25 09:23   Resp 16 06/11/25 09:20   SpO2 98 % 06/11/25 09:23   Vitals shown include unfiled device data.        Post Anesthesia Care and Evaluation    Post-procedure mental status: acceptable.  Pain management: satisfactory to patient    Airway patency: patent  Anesthetic complications: No anesthetic complications    Cardiovascular status: acceptable  Respiratory status: acceptable    Comments: Per chart review

## 2025-06-11 NOTE — H&P
Pre Procedure History & Physical    Chief Complaint:   Past history of Crohn's disease and past history of colon polyps    Subjective     HPI:   History of Crohn's and polyps    Past Medical History:   Past Medical History:   Diagnosis Date    Arthritis     Bone lesion 05/21/2020    Crohn's disease of both small and large intestine 06/25/2024    Diverticulitis     Elevated cholesterol     Essential hypertension 10/15/2020    Gout 10/15/2020    Headache     Hearing loss     Hemorrhoids     Hyperlipidemia 10/15/2020    Lumbago 07/13/2017    LOW BACK PAIN    Lumbar disc herniation 07/13/2017    LEFT; L3-4    Lumbar spinal stenosis 05/21/2020    Paresthesia 05/21/2020    Prostate cancer 05/21/2020    Prostate cancer     Radiculopathy, lumbosacral region 05/21/2020    Renal insufficiency 10/15/2020    Sciatica 07/13/2017    Small bowel obstruction 2024    Tinnitus        Past Surgical History:  Past Surgical History:   Procedure Laterality Date    BACK SURGERY      X2    CARPAL TUNNEL RELEASE      CHOLECYSTECTOMY      COLONOSCOPY  2006    DR HADLEY    COLONOSCOPY N/A 6/10/2024    Procedure: COLONOSCOPY WITH BIOPSIES;  Surgeon: Kevin Barth MD;  Location: [facility] ENDOSCOPY;  Service: Gastroenterology;  Laterality: N/A;  COLON ERROSIONS, DIVERTICULOSIS    ENDOSCOPY N/A 6/10/2024    Procedure: ESOPHAGOGASTRODUODENOSCOPY WITH BIOPSIES;  Surgeon: Kevin Barth MD;  Location: [facility] ENDOSCOPY;  Service: Gastroenterology;  Laterality: N/A;  HIATAL HERNIA    HAND SURGERY      THUMB SURGERY    KNEE ARTHROSCOPY Bilateral     LUMBAR DISCECTOMY  07/19/2017    L3-4; DR UGARTE; MINIMALLY INVASIVE    OTHER SURGICAL HISTORY      FISTULA    PROSTATE BIOPSY      PROSTATECTOMY      VASECTOMY         Family History:  Family History   Problem Relation Age of Onset    Arthritis Mother     Stroke Mother     Heart disease Mother     Diabetes Mother     Heart disease Father     Stroke Brother     Colon cancer Neg Hx   "      Social History:   reports that he has never smoked. He has never used smokeless tobacco. He reports that he does not currently use alcohol. He reports that he does not use drugs.    Medications:   Medications Prior to Admission   Medication Sig Dispense Refill Last Dose/Taking    allopurinol (ZYLOPRIM) 300 MG tablet Take 1 tablet by mouth Daily. 90 tablet 1     amLODIPine-benazepril (LOTREL 5-20) 5-20 MG per capsule Take 1 capsule by mouth Daily. 90 capsule 1     atorvastatin (LIPITOR) 20 MG tablet Take 1 tablet by mouth Every Night. 90 tablet 1     Krill Oil (Omega-3) 500 MG capsule Take 500 mg by mouth Daily.       metoprolol succinate XL (Toprol XL) 50 MG 24 hr tablet Take 1 tablet by mouth Daily. 90 tablet 1     montelukast (Singulair) 10 MG tablet Take 1 tablet by mouth Every Night. 90 tablet 1     polyethylene glycol (GoLYTELY) 236 g solution Take per office instructions 4000 mL 0     Risankizumab-rzaa (SKYRIZI IV) Infuse  into a venous catheter.       sildenafil (Viagra) 100 MG tablet Take 1 tablet by mouth Daily As Needed for Erectile Dysfunction. 30 tablet 3        Allergies:  Patient has no known allergies.        Objective     Blood pressure 129/71, pulse (!) 48, temperature 97 °F (36.1 °C), temperature source Temporal, resp. rate 16, height 180.3 cm (71\"), weight 95.3 kg (210 lb 1.6 oz), SpO2 95%.    Physical Exam   Constitutional: Pt is oriented to person, place, and time and well-developed, well-nourished, and in no distress.   Mouth/Throat: Oropharynx is clear and moist.   Neck: Normal range of motion.   Cardiovascular: Normal rate, regular rhythm and normal heart sounds.    Pulmonary/Chest: Effort normal and breath sounds normal.   Abdominal: Soft. Nontender  Skin: Skin is warm and dry.   Psychiatric: Mood, memory, affect and judgment normal.     Assessment & Plan     Diagnosis:  Surveillance for colon polyps    Anticipated Surgical Procedure:  Colonoscopy    The risks, benefits, and " alternatives of this procedure have been discussed with the patient or the responsible party- the patient understands and agrees to proceed.

## 2025-06-12 LAB
CYTO UR: NORMAL
LAB AP CASE REPORT: NORMAL
LAB AP CLINICAL INFORMATION: NORMAL
PATH REPORT.FINAL DX SPEC: NORMAL
PATH REPORT.GROSS SPEC: NORMAL

## 2025-06-16 ENCOUNTER — RESULTS FOLLOW-UP (OUTPATIENT)
Dept: GASTROENTEROLOGY | Facility: HOSPITAL | Age: 70
End: 2025-06-16
Payer: MEDICARE

## 2025-06-17 NOTE — TELEPHONE ENCOUNTER
Spoke to patient. He is aware of Lucy VALENZUELA recommendations and voiced understanding.    One year colon recall placed.  Care Gap updated.

## 2025-06-19 ENCOUNTER — LAB (OUTPATIENT)
Dept: LAB | Facility: HOSPITAL | Age: 70
End: 2025-06-19
Payer: MEDICARE

## 2025-06-19 DIAGNOSIS — C61 PROSTATE CANCER: ICD-10-CM

## 2025-06-19 LAB — PSA SERPL-MCNC: 0.37 NG/ML (ref 0–4)

## 2025-06-19 PROCEDURE — 84153 ASSAY OF PSA TOTAL: CPT

## 2025-06-19 PROCEDURE — 36415 COLL VENOUS BLD VENIPUNCTURE: CPT

## 2025-06-23 ENCOUNTER — OFFICE VISIT (OUTPATIENT)
Dept: UROLOGY | Age: 70
End: 2025-06-23
Payer: MEDICARE

## 2025-06-23 VITALS — BODY MASS INDEX: 29.4 KG/M2 | WEIGHT: 210 LBS | HEIGHT: 71 IN

## 2025-06-23 DIAGNOSIS — C61 PROSTATE CANCER: Primary | ICD-10-CM

## 2025-06-23 LAB
BILIRUB BLD-MCNC: NEGATIVE MG/DL
CLARITY, POC: CLEAR
COLOR UR: YELLOW
EXPIRATION DATE: 326
GLUCOSE UR STRIP-MCNC: NEGATIVE MG/DL
KETONES UR QL: NEGATIVE
LEUKOCYTE EST, POC: NEGATIVE
Lab: NORMAL
NITRITE UR-MCNC: NEGATIVE MG/ML
PH UR: 5.5 [PH] (ref 5–8)
PROT UR STRIP-MCNC: NEGATIVE MG/DL
RBC # UR STRIP: NEGATIVE /UL
SP GR UR: 1.02 (ref 1–1.03)
UROBILINOGEN UR QL: NORMAL

## 2025-06-23 PROCEDURE — 81003 URINALYSIS AUTO W/O SCOPE: CPT | Performed by: UROLOGY

## 2025-06-23 PROCEDURE — 99213 OFFICE O/P EST LOW 20 MIN: CPT | Performed by: UROLOGY

## 2025-06-23 PROCEDURE — 1160F RVW MEDS BY RX/DR IN RCRD: CPT | Performed by: UROLOGY

## 2025-06-23 PROCEDURE — 1159F MED LIST DOCD IN RCRD: CPT | Performed by: UROLOGY

## 2025-06-23 PROCEDURE — G2211 COMPLEX E/M VISIT ADD ON: HCPCS | Performed by: UROLOGY

## 2025-06-23 NOTE — PROGRESS NOTES
"Chief Complaint  Prostate Cancer    Subjective            Dion Espana presents to [facility] UROLOGY    History of Present Illness  The patient presents for evaluation of his PSA level.    His PSA level has shown a slight increase from 0.31 to 0.36, which is within the range of 0.2 to 0.3 that he has been maintaining since February 2025.      History of Present Illness  67 yo male s/p RALP with bilateral PLND for intermediate risk prostate cancer.   Surgery in Sept 2016  PSA 4.7  Biopsy - 3+4 L mid and 3+3 R base  cT1c     Final Path - Surgery in Sept.   [facility])  [id number]  margins were negative     His PSA is slowly rising.   1 year ago it was 0.165 and then was 0.180 6 months ago.  In October 2023 it was 0.170.  In April 2024 it was 0.240 and 0.281 in July 2024.  It was 0.31 in Oct 2024.  It was0.269 in Feb 2025.  It is now 0.36 in June 2025.     He has no other complaints.      Objective   Vital Signs:   Ht 180.3 cm (71\")   Wt 95.3 kg (210 lb)   BMI 29.29 kg/m²       Physical Exam  Vitals and nursing note reviewed.   Constitutional:       Appearance: Normal appearance. He is well-developed.   Pulmonary:      Effort: Pulmonary effort is normal.      Breath sounds: Normal air entry.   Neurological:      Mental Status: He is alert and oriented to person, place, and time.      Motor: Motor function is intact.   Psychiatric:         Mood and Affect: Mood normal.         Behavior: Behavior normal.          Result Review :   The following data was reviewed by: Silvana Alonso MD on 06/23/2025:    Results for orders placed or performed in visit on 06/23/25   POC Urinalysis Dipstick, Automated    Collection Time: 06/23/25 10:35 AM    Specimen: Urine   Result Value Ref Range    Color Yellow Yellow, Straw, Dark Yellow, Marce    Clarity, UA Clear Clear    Specific Gravity  1.025 1.005 - 1.030    pH, Urine 5.5 5.0 - 8.0    Leukocytes Negative Negative    Nitrite, UA Negative " Negative    Protein, POC Negative Negative mg/dL    Glucose, UA Negative Negative mg/dL    Ketones, UA Negative Negative    Urobilinogen, UA 0.2 E.U./dL Normal, 0.2 E.U./dL    Bilirubin Negative Negative    Blood, UA Negative Negative    Lot Number [id number]     Expiration Date 326      PSA          10/30/2024    09:25 2/3/2025    09:42 6/19/2025    08:33   PSA   PSA 0.313  0.269  0.366        Results  Labs   - PSA level: 0.36            Assessment and Plan    Diagnoses and all orders for this visit:    1. Prostate cancer (Primary)  -     POC Urinalysis Dipstick, Automated  -     PSA DIAGNOSTIC; Future      Assessment & Plan    PSA level has shown a slight increase from 0.31 to 0.36, which is within the range of 0.2 to 0.3 that has been maintained since 02/2025. Given the stability of PSA levels over the past year, it is not necessary to continue monitoring every 3 months. The frequency of PSA testing will be reduced to every 6 months. An order for a PSA test will be placed, and the patient will be scheduled accordingly. Imaging is not recommended at this time due to the low PSA level, as it is unlikely to reveal any significant findings.    Follow-up  Follow-up in 6 months or sooner if needed.          Follow Up       Return in about 6 months (around 12/23/2025) for Next scheduled follow up, PSA prior to visit.  Patient was given instructions and counseling regarding his condition or for health maintenance advice. Please see specific information pulled into the AVS if appropriate.     Transcribed from ambient dictation for Silvana Alonso MD by Silvana Alonso MD.  06/23/25   11:10 EDT    Patient or patient representative verbalized consent for the use of Ambient Listening during the visit with  Silvana Alonso MD for chart documentation. 6/23/2025  11:36 EDT

## 2025-07-23 ENCOUNTER — HOSPITAL ENCOUNTER (OUTPATIENT)
Dept: GENERAL RADIOLOGY | Facility: HOSPITAL | Age: 70
Discharge: HOME | End: 2025-07-23
Admitting: NURSE PRACTITIONER
Payer: MEDICARE

## 2025-07-23 ENCOUNTER — OFFICE VISIT (OUTPATIENT)
Dept: FAMILY MEDICINE CLINIC | Facility: CLINIC | Age: 70
End: 2025-07-23
Payer: MEDICARE

## 2025-07-23 VITALS
SYSTOLIC BLOOD PRESSURE: 140 MMHG | OXYGEN SATURATION: 95 % | HEART RATE: 61 BPM | DIASTOLIC BLOOD PRESSURE: 78 MMHG | WEIGHT: 208.4 LBS | TEMPERATURE: 97 F | HEIGHT: 71 IN | BODY MASS INDEX: 29.18 KG/M2 | RESPIRATION RATE: 16 BRPM

## 2025-07-23 DIAGNOSIS — T78.40XD ALLERGY, SUBSEQUENT ENCOUNTER: ICD-10-CM

## 2025-07-23 DIAGNOSIS — M43.6 NECK STIFFNESS: Primary | ICD-10-CM

## 2025-07-23 DIAGNOSIS — M10.9 GOUT, UNSPECIFIED CAUSE, UNSPECIFIED CHRONICITY, UNSPECIFIED SITE: ICD-10-CM

## 2025-07-23 DIAGNOSIS — E78.00 PURE HYPERCHOLESTEROLEMIA: ICD-10-CM

## 2025-07-23 DIAGNOSIS — I10 BENIGN ESSENTIAL HYPERTENSION: ICD-10-CM

## 2025-07-23 DIAGNOSIS — M43.6 NECK STIFFNESS: ICD-10-CM

## 2025-07-23 LAB
ALBUMIN SERPL-MCNC: 4.1 G/DL (ref 3.5–5.2)
ALBUMIN/GLOB SERPL: 1.2 G/DL
ALP SERPL-CCNC: 113 U/L (ref 39–117)
ALT SERPL W P-5'-P-CCNC: 20 U/L (ref 1–41)
ANION GAP SERPL CALCULATED.3IONS-SCNC: 13.1 MMOL/L (ref 5–15)
AST SERPL-CCNC: 22 U/L (ref 1–40)
BASOPHILS # BLD AUTO: 0.05 10*3/MM3 (ref 0–0.2)
BASOPHILS NFR BLD AUTO: 0.8 % (ref 0–1.5)
BILIRUB SERPL-MCNC: 0.6 MG/DL (ref 0–1.2)
BUN SERPL-MCNC: 15 MG/DL (ref 8–23)
BUN/CREAT SERPL: 15.2 (ref 7–25)
CALCIUM SPEC-SCNC: 9.6 MG/DL (ref 8.6–10.5)
CHLORIDE SERPL-SCNC: 104 MMOL/L (ref 98–107)
CHOLEST SERPL-MCNC: 130 MG/DL (ref 0–200)
CO2 SERPL-SCNC: 22.9 MMOL/L (ref 22–29)
CREAT SERPL-MCNC: 0.99 MG/DL (ref 0.76–1.27)
DEPRECATED RDW RBC AUTO: 43.1 FL (ref 37–54)
EGFRCR SERPLBLD CKD-EPI 2021: 82.5 ML/MIN/1.73
EOSINOPHIL # BLD AUTO: 0.18 10*3/MM3 (ref 0–0.4)
EOSINOPHIL NFR BLD AUTO: 2.9 % (ref 0.3–6.2)
ERYTHROCYTE [DISTWIDTH] IN BLOOD BY AUTOMATED COUNT: 13.2 % (ref 12.3–15.4)
GLOBULIN UR ELPH-MCNC: 3.3 GM/DL
GLUCOSE SERPL-MCNC: 100 MG/DL (ref 65–99)
HCT VFR BLD AUTO: 42.4 % (ref 37.5–51)
HDLC SERPL-MCNC: 34 MG/DL (ref 40–60)
HGB BLD-MCNC: 14.4 G/DL (ref 13–17.7)
IMM GRANULOCYTES # BLD AUTO: 0.04 10*3/MM3 (ref 0–0.05)
IMM GRANULOCYTES NFR BLD AUTO: 0.6 % (ref 0–0.5)
LDLC SERPL CALC-MCNC: 72 MG/DL (ref 0–100)
LDLC/HDLC SERPL: 2.05 {RATIO}
LYMPHOCYTES # BLD AUTO: 1.62 10*3/MM3 (ref 0.7–3.1)
LYMPHOCYTES NFR BLD AUTO: 26.3 % (ref 19.6–45.3)
MCH RBC QN AUTO: 30.4 PG (ref 26.6–33)
MCHC RBC AUTO-ENTMCNC: 34 G/DL (ref 31.5–35.7)
MCV RBC AUTO: 89.6 FL (ref 79–97)
MONOCYTES # BLD AUTO: 0.78 10*3/MM3 (ref 0.1–0.9)
MONOCYTES NFR BLD AUTO: 12.6 % (ref 5–12)
NEUTROPHILS NFR BLD AUTO: 3.5 10*3/MM3 (ref 1.7–7)
NEUTROPHILS NFR BLD AUTO: 56.8 % (ref 42.7–76)
NRBC BLD AUTO-RTO: 0 /100 WBC (ref 0–0.2)
PLATELET # BLD AUTO: 252 10*3/MM3 (ref 140–450)
PMV BLD AUTO: 9.7 FL (ref 6–12)
POTASSIUM SERPL-SCNC: 4.3 MMOL/L (ref 3.5–5.2)
PROT SERPL-MCNC: 7.4 G/DL (ref 6–8.5)
RBC # BLD AUTO: 4.73 10*6/MM3 (ref 4.14–5.8)
SODIUM SERPL-SCNC: 140 MMOL/L (ref 136–145)
TRIGL SERPL-MCNC: 132 MG/DL (ref 0–150)
TSH SERPL DL<=0.05 MIU/L-ACNC: 2.56 UIU/ML (ref 0.27–4.2)
URATE SERPL-MCNC: 4.2 MG/DL (ref 3.4–7)
VLDLC SERPL-MCNC: 24 MG/DL (ref 5–40)
WBC NRBC COR # BLD AUTO: 6.17 10*3/MM3 (ref 3.4–10.8)

## 2025-07-23 PROCEDURE — 85025 COMPLETE CBC W/AUTO DIFF WBC: CPT | Performed by: NURSE PRACTITIONER

## 2025-07-23 PROCEDURE — 84550 ASSAY OF BLOOD/URIC ACID: CPT | Performed by: NURSE PRACTITIONER

## 2025-07-23 PROCEDURE — 72050 X-RAY EXAM NECK SPINE 4/5VWS: CPT

## 2025-07-23 PROCEDURE — 84443 ASSAY THYROID STIM HORMONE: CPT | Performed by: NURSE PRACTITIONER

## 2025-07-23 PROCEDURE — 80053 COMPREHEN METABOLIC PANEL: CPT | Performed by: NURSE PRACTITIONER

## 2025-07-23 PROCEDURE — 80061 LIPID PANEL: CPT | Performed by: NURSE PRACTITIONER

## 2025-07-23 RX ORDER — ALLOPURINOL 300 MG/1
300 TABLET ORAL DAILY
Qty: 90 TABLET | Refills: 1 | Status: SHIPPED | OUTPATIENT
Start: 2025-07-23

## 2025-07-23 RX ORDER — ATORVASTATIN CALCIUM 20 MG/1
20 TABLET, FILM COATED ORAL NIGHTLY
Qty: 90 TABLET | Refills: 1 | Status: SHIPPED | OUTPATIENT
Start: 2025-07-23

## 2025-07-23 RX ORDER — METOPROLOL SUCCINATE 50 MG/1
50 TABLET, EXTENDED RELEASE ORAL DAILY
Qty: 90 TABLET | Refills: 1 | Status: SHIPPED | OUTPATIENT
Start: 2025-07-23

## 2025-07-23 RX ORDER — AMLODIPINE AND BENAZEPRIL HYDROCHLORIDE 5; 20 MG/1; MG/1
1 CAPSULE ORAL DAILY
Qty: 90 CAPSULE | Refills: 1 | Status: SHIPPED | OUTPATIENT
Start: 2025-07-23

## 2025-07-23 RX ORDER — MONTELUKAST SODIUM 10 MG/1
10 TABLET ORAL NIGHTLY
Qty: 90 TABLET | Refills: 1 | Status: SHIPPED | OUTPATIENT
Start: 2025-07-23

## 2025-07-23 NOTE — PROGRESS NOTES
Chief Complaint  Hypertension and Hyperlipidemia    Subjective          Dion Espana presents to [facility]  History of Present Illness    History of Present Illness  The patient presents for evaluation of gout, elevated blood pressure, and neck pain.    He reports no recent episodes of gout. He experienced a slight discomfort last week, which resolved by the next morning.    He has been under significant stress due to his wife's health issues, which may have contributed to his elevated blood pressure. He has not been experiencing any chest pain.    He also mentions experiencing intermittent headaches, which he believes may be stress-related or possibly due to sinus issues. He is having stiffness in the neck for a few months also.      Patient or patient representative verbalized consent for the use of Ambient Listening during the visit with  NICOLAS Davies for chart documentation. 7/23/2025  07:52 EDT      Depression: Not at risk (1/30/2025)    PHQ-2     PHQ-2 Score: 0    and                Allergies  Patient has no known allergies.    Social History     Tobacco Use    Smoking status: Never    Smokeless tobacco: Never   Vaping Use    Vaping status: Never Used   Substance Use Topics    Alcohol use: Not Currently    Drug use: Never       Family History   Problem Relation Age of Onset    Arthritis Mother     Stroke Mother     Heart disease Mother     Diabetes Mother     Heart disease Father     Stroke Brother     Colon cancer Neg Hx         There are no preventive care reminders to display for this patient.     Immunization History   Administered Date(s) Administered    COVID-19 (PFIZER) BIVALENT 12+YRS 01/12/2023    COVID-19 (PFIZER) Purple Cap Monovalent 03/15/2021, 04/05/2021, 11/29/2021    COVID-19 (UNSPECIFIED) 01/08/2024    Covid-19 (Pfizer) Gray Cap Monovalent 08/24/2022    Fluzone (or Fluarix & Flulaval for VFC) >6mos 10/30/2019    Fluzone High-Dose 65+yrs  "10/13/2022, 11/21/2023, 11/29/2024    Fluzone Quad >6mos (Multi-dose) 10/30/2019, 10/15/2020    Hepatitis A 07/31/2018, 03/14/2019    Pneumococcal Conjugate 13-Valent (PCV13) 01/25/2022    Pneumococcal Conjugate 20-Valent (PCV20) 02/02/2023    RSV, unspecified (Vaccine or MAB) 01/08/2024    Shingrix 02/26/2018, 08/10/2018    Tdap 11/29/2024       Review of Systems   Constitutional:  Positive for fatigue.   Respiratory:  Negative for cough and shortness of breath.    Cardiovascular:  Negative for chest pain.   Gastrointestinal:  Negative for diarrhea, nausea and vomiting.   Psychiatric/Behavioral:  Positive for stress.         Objective       Vitals:    07/23/25 0721 07/23/25 0758   BP: 147/75 140/78   Pulse: 61    Resp: 16    Temp: 97 °F (36.1 °C)    SpO2: 95%    Weight: 94.5 kg (208 lb 6.4 oz)    Height: 180.3 cm (71\")        Body mass index is 29.07 kg/m².         Physical Exam  Vitals reviewed.   Constitutional:       Appearance: Normal appearance. He is well-developed.   Neck:      Comments: Decreased ROM noted  Cardiovascular:      Rate and Rhythm: Normal rate and regular rhythm.      Heart sounds: Normal heart sounds. No murmur heard.  Pulmonary:      Effort: Pulmonary effort is normal.      Breath sounds: Normal breath sounds.   Musculoskeletal:      Cervical back: Crepitus present.   Neurological:      Mental Status: He is alert and oriented to person, place, and time.      Cranial Nerves: No cranial nerve deficit.      Motor: No weakness.   Psychiatric:         Mood and Affect: Mood and affect normal.           Physical Exam  Neck: Limited range of motion, unable to move neck more than 15 degrees.              Result Review :     The following data was reviewed by: NICOLAS Davies on 07/23/2025:    Common Labs   Common labs          1/30/2025    08:15 2/3/2025    09:42 6/19/2025    08:33   Common Labs   Glucose 92  92     BUN 15  15     Creatinine 1.11  0.97     Sodium 139  139     Potassium 4.1  " 4.0     Chloride 102  103     Calcium 9.1  8.9     Albumin 4.1  4.1     Total Bilirubin 0.9  0.7     Alkaline Phosphatase 104  102     AST (SGOT) 22  21     ALT (SGPT) 24  17     WBC 5.57  5.77     Hemoglobin 14.3  14.0     Hematocrit 42.5  41.2     Platelets 286  256     Total Cholesterol 143      Triglycerides 180      HDL Cholesterol 33      LDL Cholesterol  79      PSA  0.269  0.366    Uric Acid 4.9              No Images in the past 120 days found..         Results  Labs   - Iron levels: 71   - C-reactive protein: Less than 0.3                    Assessment and Plan      Assessment & Plan  Gout, unspecified cause, unspecified chronicity, unspecified site    Orders:    allopurinol (ZYLOPRIM) 300 MG tablet; Take 1 tablet by mouth Daily.    Uric Acid    Benign essential hypertension      Orders:    amLODIPine-benazepril (LOTREL 5-20) 5-20 MG per capsule; Take 1 capsule by mouth Daily.    metoprolol succinate XL (Toprol XL) 50 MG 24 hr tablet; Take 1 tablet by mouth Daily.    Comprehensive Metabolic Panel    CBC & Differential    TSH    Lipid Panel    Pure hypercholesterolemia       Orders:    atorvastatin (LIPITOR) 20 MG tablet; Take 1 tablet by mouth Every Night.    Comprehensive Metabolic Panel    CBC & Differential    TSH    Lipid Panel    Allergy, subsequent encounter    Orders:    montelukast (Singulair) 10 MG tablet; Take 1 tablet by mouth Every Night.    Neck stiffness    Orders:    XR Spine Cervical Complete 4 or 5 View; Future       Diagnosis Plan   1. Neck stiffness  XR Spine Cervical Complete 4 or 5 View      2. Gout, unspecified cause, unspecified chronicity, unspecified site  allopurinol (ZYLOPRIM) 300 MG tablet    Uric Acid      3. Benign essential hypertension  amLODIPine-benazepril (LOTREL 5-20) 5-20 MG per capsule    metoprolol succinate XL (Toprol XL) 50 MG 24 hr tablet    Comprehensive Metabolic Panel    CBC & Differential    TSH    Lipid Panel      4. Pure hypercholesterolemia  atorvastatin  (LIPITOR) 20 MG tablet    Comprehensive Metabolic Panel    CBC & Differential    TSH    Lipid Panel      5. Allergy, subsequent encounter  montelukast (Singulair) 10 MG tablet            Assessment & Plan  1. Gout.  - Reports no recent flare-ups of gout, with only a minor episode last week that resolved by the next morning.  - Iron levels have improved significantly, now at 71, and C-reactive protein has decreased from >3 to <0.3.  - Diagnostic PSA was performed on 06/19/2025 and another one is scheduled for 12/2025. Tests will be conducted today to assess kidney function, liver function, sodium, potassium, thyroid, and allopurinol level (uric acid level).  - Advised to continue current medications: Singulair, metoprolol, atorvastatin, Lotrel, and allopurinol. Medication sent to pharmacy.    2. Elevated blood pressure.  - Blood pressure was slightly elevated during the visit, likely due to recent stress and poor sleep conditions while caring for his wife in the hospital.  - Upon recheck, blood pressure was 140/78.  - Advised to monitor blood pressure at home once stress levels decrease and report the readings.  - No changes to blood pressure medication at this time.    3. Neck pain.  - Reports experiencing tension headaches and neck stiffness, which may be related to stress or sinus issues.  - Physical exam reveals limited neck movement, possibly due to arthritis.  - X-ray of the neck will be ordered to evaluate for potential arthritis or other causes of symptoms.  - Advised to avoid sleeping in a recliner as it may exacerbate symptoms.          Follow Up     Return in about 6 months (around 1/23/2026) for Medicare Wellness.    Patient was given instructions and counseling regarding his condition or for health maintenance advice. Please see specific information pulled into the AVS if appropriate.     Parts of this note are electronic transcriptions/translations of spoken language to printed text using the Dragon  Dictation system.          Kenia Mclean, APRN  07/23/2025

## 2025-07-23 NOTE — ASSESSMENT & PLAN NOTE
{Hyperlipidemia A/P Block (Optional):[id number]}    Orders:    atorvastatin (LIPITOR) 20 MG tablet; Take 1 tablet by mouth Every Night.    Comprehensive Metabolic Panel    CBC & Differential    TSH    Lipid Panel

## 2025-07-23 NOTE — ASSESSMENT & PLAN NOTE
{Hypertension is (optional):[id number]}    Orders:    amLODIPine-benazepril (LOTREL 5-20) 5-20 MG per capsule; Take 1 capsule by mouth Daily.    metoprolol succinate XL (Toprol XL) 50 MG 24 hr tablet; Take 1 tablet by mouth Daily.    Comprehensive Metabolic Panel    CBC & Differential    TSH    Lipid Panel

## 2025-07-27 ENCOUNTER — RESULTS FOLLOW-UP (OUTPATIENT)
Dept: FAMILY MEDICINE CLINIC | Facility: CLINIC | Age: 70
End: 2025-07-27
Payer: MEDICARE

## 2025-07-28 NOTE — TELEPHONE ENCOUNTER
Dion Espana 1955  aware and voiced understanding- States he would like to think on PT/ PAIN management and he will let us know.

## 2025-08-07 ENCOUNTER — EXTERNAL PBMM DATA (OUTPATIENT)
Dept: PHARMACY | Facility: OTHER | Age: 70
End: 2025-08-07
Payer: MEDICARE

## (undated) DEVICE — BLCK/BITE BLOX WO/DENTL/RIM W/STRAP 54F

## (undated) DEVICE — SINGLE-USE BIOPSY FORCEPS: Brand: RADIAL JAW 4

## (undated) DEVICE — SOLIDIFIER LIQLOC PLS 1500CC BT

## (undated) DEVICE — THE STERILE LIGHT HANDLE COVER IS USED WITH STERIS SURGICAL LIGHTING AND VISUALIZATION SYSTEMS.

## (undated) DEVICE — LINER SURG CANSTR SXN S/RIGD 1500CC

## (undated) DEVICE — SOL IRRG H2O PL/BG 1000ML STRL

## (undated) DEVICE — DEFENDO AIR WATER SUCTION AND BIOPSY VALVE KIT FOR  OLYMPUS: Brand: DEFENDO AIR/WATER/SUCTION AND BIOPSY VALVE

## (undated) DEVICE — Device: Brand: DEFENDO AIR/WATER/SUCTION AND BIOPSY VALVE

## (undated) DEVICE — Device

## (undated) DEVICE — CONN JET HYDRA H20 AUXILIARY DISP